# Patient Record
Sex: FEMALE | Race: WHITE | NOT HISPANIC OR LATINO | Employment: OTHER
[De-identification: names, ages, dates, MRNs, and addresses within clinical notes are randomized per-mention and may not be internally consistent; named-entity substitution may affect disease eponyms.]

---

## 2017-08-19 ENCOUNTER — HEALTH MAINTENANCE LETTER (OUTPATIENT)
Age: 52
End: 2017-08-19

## 2019-11-06 ENCOUNTER — HEALTH MAINTENANCE LETTER (OUTPATIENT)
Age: 54
End: 2019-11-06

## 2020-11-29 ENCOUNTER — HEALTH MAINTENANCE LETTER (OUTPATIENT)
Age: 55
End: 2020-11-29

## 2021-05-26 ENCOUNTER — RECORDS - HEALTHEAST (OUTPATIENT)
Dept: ADMINISTRATIVE | Facility: CLINIC | Age: 56
End: 2021-05-26

## 2021-05-27 ENCOUNTER — RECORDS - HEALTHEAST (OUTPATIENT)
Dept: ADMINISTRATIVE | Facility: CLINIC | Age: 56
End: 2021-05-27

## 2021-05-30 ENCOUNTER — HEALTH MAINTENANCE LETTER (OUTPATIENT)
Age: 56
End: 2021-05-30

## 2021-09-19 ENCOUNTER — HEALTH MAINTENANCE LETTER (OUTPATIENT)
Age: 56
End: 2021-09-19

## 2021-11-14 ENCOUNTER — HEALTH MAINTENANCE LETTER (OUTPATIENT)
Age: 56
End: 2021-11-14

## 2022-01-09 ENCOUNTER — HEALTH MAINTENANCE LETTER (OUTPATIENT)
Age: 57
End: 2022-01-09

## 2022-08-21 ENCOUNTER — HEALTH MAINTENANCE LETTER (OUTPATIENT)
Age: 57
End: 2022-08-21

## 2022-11-21 ENCOUNTER — HEALTH MAINTENANCE LETTER (OUTPATIENT)
Age: 57
End: 2022-11-21

## 2023-04-16 ENCOUNTER — HEALTH MAINTENANCE LETTER (OUTPATIENT)
Age: 58
End: 2023-04-16

## 2023-10-07 ENCOUNTER — APPOINTMENT (OUTPATIENT)
Dept: MRI IMAGING | Facility: HOSPITAL | Age: 58
End: 2023-10-07
Attending: EMERGENCY MEDICINE
Payer: COMMERCIAL

## 2023-10-07 ENCOUNTER — HOSPITAL ENCOUNTER (EMERGENCY)
Facility: HOSPITAL | Age: 58
Discharge: HOME | End: 2023-10-07
Attending: EMERGENCY MEDICINE | Admitting: EMERGENCY MEDICINE
Payer: COMMERCIAL

## 2023-10-07 VITALS
SYSTOLIC BLOOD PRESSURE: 159 MMHG | OXYGEN SATURATION: 97 % | DIASTOLIC BLOOD PRESSURE: 70 MMHG | RESPIRATION RATE: 18 BRPM | BODY MASS INDEX: 24.17 KG/M2 | TEMPERATURE: 97.6 F | HEART RATE: 88 BPM | WEIGHT: 143 LBS

## 2023-10-07 DIAGNOSIS — L03.115 CELLULITIS OF RIGHT FOOT: ICD-10-CM

## 2023-10-07 LAB
ANION GAP SERPL CALCULATED.3IONS-SCNC: 16 MMOL/L (ref 7–15)
BASO+EOS+MONOS # BLD AUTO: ABNORMAL 10*3/UL
BASO+EOS+MONOS NFR BLD AUTO: ABNORMAL %
BASOPHILS # BLD AUTO: 0.1 10E3/UL (ref 0–0.2)
BASOPHILS NFR BLD AUTO: 1 %
BUN SERPL-MCNC: 16.6 MG/DL (ref 6–20)
CALCIUM SERPL-MCNC: 8.9 MG/DL (ref 8.6–10)
CHLORIDE SERPL-SCNC: 97 MMOL/L (ref 98–107)
CREAT SERPL-MCNC: 0.89 MG/DL (ref 0.51–0.95)
CRP SERPL-MCNC: <3 MG/L
DEPRECATED HCO3 PLAS-SCNC: 22 MMOL/L (ref 22–29)
EGFRCR SERPLBLD CKD-EPI 2021: 75 ML/MIN/1.73M2
EOSINOPHIL # BLD AUTO: 0.1 10E3/UL (ref 0–0.7)
EOSINOPHIL NFR BLD AUTO: 1 %
ERYTHROCYTE [DISTWIDTH] IN BLOOD BY AUTOMATED COUNT: 15.3 % (ref 10–15)
ERYTHROCYTE [SEDIMENTATION RATE] IN BLOOD BY WESTERGREN METHOD: 60 MM/HR (ref 0–30)
GLUCOSE SERPL-MCNC: 252 MG/DL (ref 70–99)
HCT VFR BLD AUTO: 37.1 % (ref 35–47)
HGB BLD-MCNC: 11.5 G/DL (ref 11.7–15.7)
HOLD SPECIMEN: NORMAL
IMM GRANULOCYTES # BLD: 0 10E3/UL
IMM GRANULOCYTES NFR BLD: 0 %
LYMPHOCYTES # BLD AUTO: 2.8 10E3/UL (ref 0.8–5.3)
LYMPHOCYTES NFR BLD AUTO: 26 %
MCH RBC QN AUTO: 26.1 PG (ref 26.5–33)
MCHC RBC AUTO-ENTMCNC: 31 G/DL (ref 31.5–36.5)
MCV RBC AUTO: 84 FL (ref 78–100)
MONOCYTES # BLD AUTO: 0.6 10E3/UL (ref 0–1.3)
MONOCYTES NFR BLD AUTO: 5 %
NEUTROPHILS # BLD AUTO: 7.3 10E3/UL (ref 1.6–8.3)
NEUTROPHILS NFR BLD AUTO: 67 %
NRBC # BLD AUTO: 0 10E3/UL
NRBC BLD AUTO-RTO: 0 /100
PLATELET # BLD AUTO: 404 10E3/UL (ref 150–450)
POTASSIUM SERPL-SCNC: 4.1 MMOL/L (ref 3.4–5.3)
RBC # BLD AUTO: 4.41 10E6/UL (ref 3.8–5.2)
SODIUM SERPL-SCNC: 135 MMOL/L (ref 135–145)
WBC # BLD AUTO: 10.9 10E3/UL (ref 4–11)

## 2023-10-07 PROCEDURE — 99285 EMERGENCY DEPT VISIT HI MDM: CPT | Mod: 25

## 2023-10-07 PROCEDURE — 87040 BLOOD CULTURE FOR BACTERIA: CPT | Performed by: EMERGENCY MEDICINE

## 2023-10-07 PROCEDURE — 96366 THER/PROPH/DIAG IV INF ADDON: CPT

## 2023-10-07 PROCEDURE — 85652 RBC SED RATE AUTOMATED: CPT | Performed by: EMERGENCY MEDICINE

## 2023-10-07 PROCEDURE — 250N000011 HC RX IP 250 OP 636: Performed by: EMERGENCY MEDICINE

## 2023-10-07 PROCEDURE — 85025 COMPLETE CBC W/AUTO DIFF WBC: CPT | Performed by: EMERGENCY MEDICINE

## 2023-10-07 PROCEDURE — 96367 TX/PROPH/DG ADDL SEQ IV INF: CPT

## 2023-10-07 PROCEDURE — 96365 THER/PROPH/DIAG IV INF INIT: CPT | Mod: 59

## 2023-10-07 PROCEDURE — 250N000013 HC RX MED GY IP 250 OP 250 PS 637: Performed by: EMERGENCY MEDICINE

## 2023-10-07 PROCEDURE — 255N000002 HC RX 255 OP 636: Mod: JZ | Performed by: EMERGENCY MEDICINE

## 2023-10-07 PROCEDURE — 73720 MRI LWR EXTREMITY W/O&W/DYE: CPT | Mod: RT

## 2023-10-07 PROCEDURE — A9585 GADOBUTROL INJECTION: HCPCS | Mod: JZ | Performed by: EMERGENCY MEDICINE

## 2023-10-07 PROCEDURE — 36415 COLL VENOUS BLD VENIPUNCTURE: CPT | Performed by: EMERGENCY MEDICINE

## 2023-10-07 PROCEDURE — 258N000003 HC RX IP 258 OP 636: Performed by: EMERGENCY MEDICINE

## 2023-10-07 PROCEDURE — 86140 C-REACTIVE PROTEIN: CPT | Performed by: EMERGENCY MEDICINE

## 2023-10-07 PROCEDURE — 80048 BASIC METABOLIC PNL TOTAL CA: CPT | Performed by: EMERGENCY MEDICINE

## 2023-10-07 RX ORDER — CEFTRIAXONE 1 G/1
1 INJECTION, POWDER, FOR SOLUTION INTRAMUSCULAR; INTRAVENOUS ONCE
Status: COMPLETED | OUTPATIENT
Start: 2023-10-07 | End: 2023-10-07

## 2023-10-07 RX ORDER — SULFAMETHOXAZOLE/TRIMETHOPRIM 800-160 MG
1 TABLET ORAL 2 TIMES DAILY
Qty: 14 TABLET | Refills: 0 | Status: SHIPPED | OUTPATIENT
Start: 2023-10-07 | End: 2023-10-14

## 2023-10-07 RX ORDER — OXYCODONE HYDROCHLORIDE 5 MG/1
5 TABLET ORAL ONCE
Status: COMPLETED | OUTPATIENT
Start: 2023-10-07 | End: 2023-10-07

## 2023-10-07 RX ORDER — DOCUSATE SODIUM 100 MG/1
100 CAPSULE, LIQUID FILLED ORAL 2 TIMES DAILY PRN
Qty: 10 CAPSULE | Refills: 0 | Status: SHIPPED | OUTPATIENT
Start: 2023-10-07

## 2023-10-07 RX ORDER — OXYCODONE HYDROCHLORIDE 5 MG/1
5 TABLET ORAL EVERY 6 HOURS PRN
Qty: 8 TABLET | Refills: 0 | Status: SHIPPED | OUTPATIENT
Start: 2023-10-07

## 2023-10-07 RX ORDER — CEFAZOLIN SODIUM 1 G/50ML
1250 SOLUTION INTRAVENOUS ONCE
Status: COMPLETED | OUTPATIENT
Start: 2023-10-07 | End: 2023-10-07

## 2023-10-07 RX ORDER — GADOBUTROL 604.72 MG/ML
6 INJECTION INTRAVENOUS ONCE
Status: COMPLETED | OUTPATIENT
Start: 2023-10-07 | End: 2023-10-07

## 2023-10-07 RX ADMIN — OXYCODONE HYDROCHLORIDE 5 MG: 5 TABLET ORAL at 15:13

## 2023-10-07 RX ADMIN — CEFTRIAXONE SODIUM 1 G: 1 INJECTION, POWDER, FOR SOLUTION INTRAMUSCULAR; INTRAVENOUS at 16:39

## 2023-10-07 RX ADMIN — GADOBUTROL 6 ML: 604.72 INJECTION INTRAVENOUS at 14:48

## 2023-10-07 RX ADMIN — VANCOMYCIN HYDROCHLORIDE 1250 MG: 500 INJECTION, POWDER, LYOPHILIZED, FOR SOLUTION INTRAVENOUS at 15:09

## 2023-10-07 ASSESSMENT — ENCOUNTER SYMPTOMS
SHORTNESS OF BREATH: 0
ABDOMINAL PAIN: 0
COUGH: 0
DIARRHEA: 0
VOMITING: 0
FEVER: 0
COLOR CHANGE: 1
NAUSEA: 0

## 2023-10-07 ASSESSMENT — ACTIVITIES OF DAILY LIVING (ADL)
ADLS_ACUITY_SCORE: 35
ADLS_ACUITY_SCORE: 35
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
## 2023-10-07 NOTE — ED TRIAGE NOTES
The pt arrives with family with c/o of foot pain. The pt had her great toe on the right foot amputated in May due to infection. She states the foot is always read but has gotten more painful and red over the past few weeks.      Triage Assessment       Row Name 10/07/23 9228       Triage Assessment (Adult)    Airway WDL WDL       Cognitive/Neuro/Behavioral WDL    Cognitive/Neuro/Behavioral WDL WDL

## 2023-10-07 NOTE — ED NOTES
Patient discharged at 1722 to home; family providing transportation. Patient provided with all discharge paperwork and educational material. All questions answered to patient's satisfaction.

## 2023-10-07 NOTE — DISCHARGE INSTRUCTIONS
Take the antibiotics as directed.  Take your first dose this evening at bedtime.    Call make an appointment to follow-up with podiatry to help follow along this infection.    Keep your appointment to see your specialist in [place] that is upcoming.    Return the emergency department with worsening redness, worsening pain, fevers, pus draining, or any other concerns.    Thank you for choosing [facility] Emergency Department.  It has been my pleasure caring for you today.     ~Dr. Osmany MD

## 2023-10-12 LAB
BACTERIA BLD CULT: NO GROWTH
BACTERIA BLD CULT: NO GROWTH

## 2023-11-26 ENCOUNTER — HEALTH MAINTENANCE LETTER (OUTPATIENT)
Age: 58
End: 2023-11-26

## 2024-06-23 ENCOUNTER — HEALTH MAINTENANCE LETTER (OUTPATIENT)
Age: 59
End: 2024-06-23

## 2025-01-04 ENCOUNTER — HEALTH MAINTENANCE LETTER (OUTPATIENT)
Age: 60
End: 2025-01-04

## 2025-02-04 ENCOUNTER — LAB REQUISITION (OUTPATIENT)
Dept: LAB | Facility: CLINIC | Age: 60
End: 2025-02-04
Payer: COMMERCIAL

## 2025-02-04 DIAGNOSIS — R76.12 NONSPECIFIC REACTION TO CELL MEDIATED IMMUNITY MEASUREMENT OF GAMMA INTERFERON ANTIGEN RESPONSE WITHOUT ACTIVE TUBERCULOSIS: ICD-10-CM

## 2025-02-05 ENCOUNTER — LAB REQUISITION (OUTPATIENT)
Dept: LAB | Facility: CLINIC | Age: 60
End: 2025-02-05

## 2025-02-05 ENCOUNTER — LAB REQUISITION (OUTPATIENT)
Dept: LAB | Facility: CLINIC | Age: 60
End: 2025-02-05
Payer: COMMERCIAL

## 2025-02-05 DIAGNOSIS — R94.5 ABNORMAL RESULTS OF LIVER FUNCTION STUDIES: ICD-10-CM

## 2025-02-05 DIAGNOSIS — R94.4 ABNORMAL RESULTS OF KIDNEY FUNCTION STUDIES: ICD-10-CM

## 2025-02-05 DIAGNOSIS — I73.9 PERIPHERAL VASCULAR DISEASE, UNSPECIFIED: ICD-10-CM

## 2025-02-05 DIAGNOSIS — R79.9 ABNORMAL FINDING OF BLOOD CHEMISTRY, UNSPECIFIED: ICD-10-CM

## 2025-02-05 DIAGNOSIS — T81.42XD INFECTION FOLLOWING A PROCEDURE, DEEP INCISIONAL SURGICAL SITE, SUBSEQUENT ENCOUNTER: ICD-10-CM

## 2025-02-05 PROCEDURE — 36415 COLL VENOUS BLD VENIPUNCTURE: CPT | Mod: ORL | Performed by: NURSE PRACTITIONER

## 2025-02-05 PROCEDURE — P9604 ONE-WAY ALLOW PRORATED TRIP: HCPCS | Mod: ORL | Performed by: NURSE PRACTITIONER

## 2025-02-05 PROCEDURE — 86481 TB AG RESPONSE T-CELL SUSP: CPT | Mod: ORL | Performed by: NURSE PRACTITIONER

## 2025-02-06 LAB
ALBUMIN SERPL BCG-MCNC: 3.4 G/DL (ref 3.5–5.2)
ALP SERPL-CCNC: 99 U/L (ref 40–150)
ALT SERPL W P-5'-P-CCNC: 19 U/L (ref 0–50)
ANION GAP SERPL CALCULATED.3IONS-SCNC: 16 MMOL/L (ref 7–15)
AST SERPL W P-5'-P-CCNC: 28 U/L (ref 0–45)
BASOPHILS # BLD AUTO: 0 10E3/UL (ref 0–0.2)
BASOPHILS NFR BLD AUTO: 1 %
BILIRUB DIRECT SERPL-MCNC: <0.2 MG/DL (ref 0–0.3)
BILIRUB SERPL-MCNC: <0.2 MG/DL
BUN SERPL-MCNC: 19.7 MG/DL (ref 8–23)
CALCIUM SERPL-MCNC: 9.1 MG/DL (ref 8.8–10.4)
CHLORIDE SERPL-SCNC: 105 MMOL/L (ref 98–107)
CREAT SERPL-MCNC: 0.87 MG/DL (ref 0.51–0.95)
CREAT SERPL-MCNC: 0.87 MG/DL (ref 0.51–0.95)
EGFRCR SERPLBLD CKD-EPI 2021: 76 ML/MIN/1.73M2
EGFRCR SERPLBLD CKD-EPI 2021: 76 ML/MIN/1.73M2
EOSINOPHIL # BLD AUTO: 0.2 10E3/UL (ref 0–0.7)
EOSINOPHIL NFR BLD AUTO: 2 %
ERYTHROCYTE [DISTWIDTH] IN BLOOD BY AUTOMATED COUNT: 17.2 % (ref 10–15)
GAMMA INTERFERON BACKGROUND BLD IA-ACNC: 0.01 IU/ML
GLUCOSE SERPL-MCNC: 211 MG/DL (ref 70–99)
HCO3 SERPL-SCNC: 17 MMOL/L (ref 22–29)
HCT VFR BLD AUTO: 30.2 % (ref 35–47)
HGB BLD-MCNC: 9.5 G/DL (ref 11.7–15.7)
IMM GRANULOCYTES # BLD: 0.1 10E3/UL
IMM GRANULOCYTES NFR BLD: 1 %
LYMPHOCYTES # BLD AUTO: 0.7 10E3/UL (ref 0.8–5.3)
LYMPHOCYTES NFR BLD AUTO: 9 %
M TB IFN-G BLD-IMP: NEGATIVE
M TB IFN-G CD4+ BCKGRND COR BLD-ACNC: 9.99 IU/ML
MCH RBC QN AUTO: 26.7 PG (ref 26.5–33)
MCHC RBC AUTO-ENTMCNC: 31.5 G/DL (ref 31.5–36.5)
MCV RBC AUTO: 85 FL (ref 78–100)
MITOGEN IGNF BCKGRD COR BLD-ACNC: 0.01 IU/ML
MITOGEN IGNF BCKGRD COR BLD-ACNC: 0.01 IU/ML
MONOCYTES # BLD AUTO: 0.6 10E3/UL (ref 0–1.3)
MONOCYTES NFR BLD AUTO: 7 %
NEUTROPHILS # BLD AUTO: 6.5 10E3/UL (ref 1.6–8.3)
NEUTROPHILS NFR BLD AUTO: 81 %
NRBC # BLD AUTO: 0 10E3/UL
NRBC BLD AUTO-RTO: 0 /100
PLAT MORPH BLD: ABNORMAL
PLATELET # BLD AUTO: ABNORMAL 10*3/UL
POTASSIUM SERPL-SCNC: 4.8 MMOL/L (ref 3.4–5.3)
PROT SERPL-MCNC: 6.8 G/DL (ref 6.4–8.3)
QUANTIFERON MITOGEN: 10 IU/ML
QUANTIFERON NIL TUBE: 0.01 IU/ML
QUANTIFERON TB1 TUBE: 0.02 IU/ML
QUANTIFERON TB2 TUBE: 0.02
RBC # BLD AUTO: 3.56 10E6/UL (ref 3.8–5.2)
RBC MORPH BLD: ABNORMAL
SODIUM SERPL-SCNC: 138 MMOL/L (ref 135–145)
WBC # BLD AUTO: 8 10E3/UL (ref 4–11)

## 2025-02-06 PROCEDURE — 82565 ASSAY OF CREATININE: CPT | Mod: ORL | Performed by: NURSE PRACTITIONER

## 2025-02-06 PROCEDURE — 80053 COMPREHEN METABOLIC PANEL: CPT | Mod: ORL | Performed by: FAMILY MEDICINE

## 2025-02-06 PROCEDURE — 85018 HEMOGLOBIN: CPT | Mod: ORL | Performed by: NURSE PRACTITIONER

## 2025-02-06 PROCEDURE — 85004 AUTOMATED DIFF WBC COUNT: CPT | Mod: ORL | Performed by: NURSE PRACTITIONER

## 2025-02-06 PROCEDURE — 82248 BILIRUBIN DIRECT: CPT | Mod: ORL | Performed by: NURSE PRACTITIONER

## 2025-02-06 PROCEDURE — P9604 ONE-WAY ALLOW PRORATED TRIP: HCPCS | Mod: ORL | Performed by: NURSE PRACTITIONER

## 2025-02-06 PROCEDURE — 36415 COLL VENOUS BLD VENIPUNCTURE: CPT | Mod: ORL | Performed by: NURSE PRACTITIONER

## 2025-02-06 PROCEDURE — 85041 AUTOMATED RBC COUNT: CPT | Mod: ORL | Performed by: NURSE PRACTITIONER

## 2025-02-07 LAB — HOLD SPECIMEN: NORMAL

## 2025-02-07 PROCEDURE — 36415 COLL VENOUS BLD VENIPUNCTURE: CPT | Mod: ORL | Performed by: NURSE PRACTITIONER

## 2025-02-11 ENCOUNTER — LAB REQUISITION (OUTPATIENT)
Dept: LAB | Facility: CLINIC | Age: 60
End: 2025-02-11
Payer: COMMERCIAL

## 2025-02-11 DIAGNOSIS — E78.5 HYPERLIPIDEMIA, UNSPECIFIED: ICD-10-CM

## 2025-02-11 DIAGNOSIS — I10 ESSENTIAL (PRIMARY) HYPERTENSION: ICD-10-CM

## 2025-02-13 LAB
BASOPHILS # BLD AUTO: 0.1 10E3/UL (ref 0–0.2)
BASOPHILS NFR BLD AUTO: 1 %
EOSINOPHIL # BLD AUTO: 0.1 10E3/UL (ref 0–0.7)
EOSINOPHIL NFR BLD AUTO: 1 %
ERYTHROCYTE [DISTWIDTH] IN BLOOD BY AUTOMATED COUNT: 17.2 % (ref 10–15)
HCT VFR BLD AUTO: 33.1 % (ref 35–47)
HGB BLD-MCNC: 9.3 G/DL (ref 11.7–15.7)
IMM GRANULOCYTES # BLD: 0.1 10E3/UL
IMM GRANULOCYTES NFR BLD: 1 %
LYMPHOCYTES # BLD AUTO: 1 10E3/UL (ref 0.8–5.3)
LYMPHOCYTES NFR BLD AUTO: 11 %
MCH RBC QN AUTO: 25.1 PG (ref 26.5–33)
MCHC RBC AUTO-ENTMCNC: 28.1 G/DL (ref 31.5–36.5)
MCV RBC AUTO: 90 FL (ref 78–100)
MONOCYTES # BLD AUTO: 0.5 10E3/UL (ref 0–1.3)
MONOCYTES NFR BLD AUTO: 6 %
NEUTROPHILS # BLD AUTO: 7.2 10E3/UL (ref 1.6–8.3)
NEUTROPHILS NFR BLD AUTO: 81 %
NRBC # BLD AUTO: 0 10E3/UL
NRBC BLD AUTO-RTO: 0 /100
PLATELET # BLD AUTO: 401 10E3/UL (ref 150–450)
RBC # BLD AUTO: 3.7 10E6/UL (ref 3.8–5.2)
WBC # BLD AUTO: 8.9 10E3/UL (ref 4–11)

## 2025-02-13 PROCEDURE — 80076 HEPATIC FUNCTION PANEL: CPT | Mod: ORL | Performed by: NURSE PRACTITIONER

## 2025-02-13 PROCEDURE — P9604 ONE-WAY ALLOW PRORATED TRIP: HCPCS | Mod: ORL | Performed by: NURSE PRACTITIONER

## 2025-02-13 PROCEDURE — 36415 COLL VENOUS BLD VENIPUNCTURE: CPT | Mod: ORL | Performed by: NURSE PRACTITIONER

## 2025-02-13 PROCEDURE — 85025 COMPLETE CBC W/AUTO DIFF WBC: CPT | Mod: ORL | Performed by: NURSE PRACTITIONER

## 2025-02-13 PROCEDURE — 82565 ASSAY OF CREATININE: CPT | Mod: ORL | Performed by: NURSE PRACTITIONER

## 2025-02-14 LAB
ALBUMIN SERPL BCG-MCNC: 3.8 G/DL (ref 3.5–5.2)
ALP SERPL-CCNC: 87 U/L (ref 40–150)
ALT SERPL W P-5'-P-CCNC: 18 U/L (ref 0–50)
AST SERPL W P-5'-P-CCNC: 17 U/L (ref 0–45)
BILIRUB DIRECT SERPL-MCNC: <0.2 MG/DL (ref 0–0.3)
BILIRUB SERPL-MCNC: <0.2 MG/DL
CREAT SERPL-MCNC: 0.98 MG/DL (ref 0.51–0.95)
EGFRCR SERPLBLD CKD-EPI 2021: 66 ML/MIN/1.73M2
PROT SERPL-MCNC: 7.1 G/DL (ref 6.4–8.3)

## 2025-02-18 ENCOUNTER — LAB REQUISITION (OUTPATIENT)
Dept: LAB | Facility: CLINIC | Age: 60
End: 2025-02-18
Payer: COMMERCIAL

## 2025-02-18 DIAGNOSIS — E78.5 HYPERLIPIDEMIA, UNSPECIFIED: ICD-10-CM

## 2025-02-18 DIAGNOSIS — I10 ESSENTIAL (PRIMARY) HYPERTENSION: ICD-10-CM

## 2025-02-20 LAB
ALBUMIN SERPL BCG-MCNC: 3.9 G/DL (ref 3.5–5.2)
ALP SERPL-CCNC: 100 U/L (ref 40–150)
ALT SERPL W P-5'-P-CCNC: 14 U/L (ref 0–50)
AST SERPL W P-5'-P-CCNC: 17 U/L (ref 0–45)
BASOPHILS # BLD AUTO: 0.1 10E3/UL (ref 0–0.2)
BASOPHILS NFR BLD AUTO: 1 %
BILIRUB DIRECT SERPL-MCNC: <0.08 MG/DL (ref 0–0.3)
BILIRUB SERPL-MCNC: 0.2 MG/DL
CREAT SERPL-MCNC: 0.95 MG/DL (ref 0.51–0.95)
EGFRCR SERPLBLD CKD-EPI 2021: 69 ML/MIN/1.73M2
EOSINOPHIL # BLD AUTO: 0.1 10E3/UL (ref 0–0.7)
EOSINOPHIL NFR BLD AUTO: 1 %
ERYTHROCYTE [DISTWIDTH] IN BLOOD BY AUTOMATED COUNT: 16.6 % (ref 10–15)
HCT VFR BLD AUTO: 30.2 % (ref 35–47)
HGB BLD-MCNC: 9.4 G/DL (ref 11.7–15.7)
IMM GRANULOCYTES # BLD: 0 10E3/UL
IMM GRANULOCYTES NFR BLD: 0 %
LYMPHOCYTES # BLD AUTO: 0.9 10E3/UL (ref 0.8–5.3)
LYMPHOCYTES NFR BLD AUTO: 11 %
MCH RBC QN AUTO: 25.5 PG (ref 26.5–33)
MCHC RBC AUTO-ENTMCNC: 31.1 G/DL (ref 31.5–36.5)
MCV RBC AUTO: 82 FL (ref 78–100)
MONOCYTES # BLD AUTO: 0.9 10E3/UL (ref 0–1.3)
MONOCYTES NFR BLD AUTO: 12 %
NEUTROPHILS # BLD AUTO: 5.8 10E3/UL (ref 1.6–8.3)
NEUTROPHILS NFR BLD AUTO: 75 %
NRBC # BLD AUTO: 0 10E3/UL
NRBC BLD AUTO-RTO: 0 /100
PLATELET # BLD AUTO: 359 10E3/UL (ref 150–450)
PROT SERPL-MCNC: 6.9 G/DL (ref 6.4–8.3)
RBC # BLD AUTO: 3.69 10E6/UL (ref 3.8–5.2)
WBC # BLD AUTO: 7.7 10E3/UL (ref 4–11)

## 2025-02-20 PROCEDURE — 80076 HEPATIC FUNCTION PANEL: CPT | Mod: ORL | Performed by: NURSE PRACTITIONER

## 2025-02-20 PROCEDURE — 85025 COMPLETE CBC W/AUTO DIFF WBC: CPT | Mod: ORL | Performed by: NURSE PRACTITIONER

## 2025-02-20 PROCEDURE — 82565 ASSAY OF CREATININE: CPT | Mod: ORL | Performed by: NURSE PRACTITIONER

## 2025-02-20 PROCEDURE — 36415 COLL VENOUS BLD VENIPUNCTURE: CPT | Mod: ORL | Performed by: NURSE PRACTITIONER

## 2025-02-20 PROCEDURE — P9604 ONE-WAY ALLOW PRORATED TRIP: HCPCS | Mod: ORL | Performed by: NURSE PRACTITIONER

## 2025-02-24 ENCOUNTER — LAB REQUISITION (OUTPATIENT)
Dept: LAB | Facility: CLINIC | Age: 60
End: 2025-02-24
Payer: COMMERCIAL

## 2025-02-24 DIAGNOSIS — E78.5 HYPERLIPIDEMIA, UNSPECIFIED: ICD-10-CM

## 2025-02-24 DIAGNOSIS — I10 ESSENTIAL (PRIMARY) HYPERTENSION: ICD-10-CM

## 2025-02-27 LAB
ALBUMIN SERPL BCG-MCNC: 3.8 G/DL (ref 3.5–5.2)
ALP SERPL-CCNC: 125 U/L (ref 40–150)
ALT SERPL W P-5'-P-CCNC: 13 U/L (ref 0–50)
AST SERPL W P-5'-P-CCNC: 15 U/L (ref 0–45)
BASOPHILS # BLD AUTO: 0 10E3/UL (ref 0–0.2)
BASOPHILS NFR BLD AUTO: 1 %
BILIRUB DIRECT SERPL-MCNC: <0.08 MG/DL (ref 0–0.3)
BILIRUB SERPL-MCNC: <0.2 MG/DL
CREAT SERPL-MCNC: 1 MG/DL (ref 0.51–0.95)
EGFRCR SERPLBLD CKD-EPI 2021: 65 ML/MIN/1.73M2
EOSINOPHIL # BLD AUTO: 0.2 10E3/UL (ref 0–0.7)
EOSINOPHIL NFR BLD AUTO: 2 %
ERYTHROCYTE [DISTWIDTH] IN BLOOD BY AUTOMATED COUNT: 16.9 % (ref 10–15)
HCT VFR BLD AUTO: 30.2 % (ref 35–47)
HGB BLD-MCNC: 9.3 G/DL (ref 11.7–15.7)
IMM GRANULOCYTES # BLD: 0.1 10E3/UL
IMM GRANULOCYTES NFR BLD: 1 %
LYMPHOCYTES # BLD AUTO: 0.8 10E3/UL (ref 0.8–5.3)
LYMPHOCYTES NFR BLD AUTO: 9 %
MCH RBC QN AUTO: 25.7 PG (ref 26.5–33)
MCHC RBC AUTO-ENTMCNC: 30.8 G/DL (ref 31.5–36.5)
MCV RBC AUTO: 83 FL (ref 78–100)
MONOCYTES # BLD AUTO: 1 10E3/UL (ref 0–1.3)
MONOCYTES NFR BLD AUTO: 11 %
NEUTROPHILS # BLD AUTO: 6.7 10E3/UL (ref 1.6–8.3)
NEUTROPHILS NFR BLD AUTO: 76 %
NRBC # BLD AUTO: 0 10E3/UL
NRBC BLD AUTO-RTO: 0 /100
PLATELET # BLD AUTO: 302 10E3/UL (ref 150–450)
PROT SERPL-MCNC: 7.2 G/DL (ref 6.4–8.3)
RBC # BLD AUTO: 3.62 10E6/UL (ref 3.8–5.2)
WBC # BLD AUTO: 8.8 10E3/UL (ref 4–11)

## 2025-02-27 PROCEDURE — P9604 ONE-WAY ALLOW PRORATED TRIP: HCPCS | Mod: ORL | Performed by: NURSE PRACTITIONER

## 2025-02-27 PROCEDURE — 36415 COLL VENOUS BLD VENIPUNCTURE: CPT | Mod: ORL | Performed by: NURSE PRACTITIONER

## 2025-02-27 PROCEDURE — 80076 HEPATIC FUNCTION PANEL: CPT | Mod: ORL | Performed by: NURSE PRACTITIONER

## 2025-02-27 PROCEDURE — 82565 ASSAY OF CREATININE: CPT | Mod: ORL | Performed by: NURSE PRACTITIONER

## 2025-02-27 PROCEDURE — 85025 COMPLETE CBC W/AUTO DIFF WBC: CPT | Mod: ORL | Performed by: NURSE PRACTITIONER

## 2025-03-18 ENCOUNTER — LAB REQUISITION (OUTPATIENT)
Dept: LAB | Facility: CLINIC | Age: 60
End: 2025-03-18
Payer: COMMERCIAL

## 2025-03-18 DIAGNOSIS — E11.9 TYPE 2 DIABETES MELLITUS WITHOUT COMPLICATIONS (H): ICD-10-CM

## 2025-03-18 DIAGNOSIS — E03.8 OTHER SPECIFIED HYPOTHYROIDISM: ICD-10-CM

## 2025-03-19 LAB
EST. AVERAGE GLUCOSE BLD GHB EST-MCNC: 174 MG/DL
HBA1C MFR BLD: 7.7 %
T3 SERPL-MCNC: 62 NG/DL (ref 85–202)
T4 FREE SERPL-MCNC: 1.43 NG/DL (ref 0.9–1.7)
TSH SERPL DL<=0.005 MIU/L-ACNC: 2.67 UIU/ML (ref 0.3–4.2)

## 2025-03-19 PROCEDURE — 36415 COLL VENOUS BLD VENIPUNCTURE: CPT | Mod: ORL | Performed by: NURSE PRACTITIONER

## 2025-03-19 PROCEDURE — 84480 ASSAY TRIIODOTHYRONINE (T3): CPT | Mod: ORL | Performed by: NURSE PRACTITIONER

## 2025-03-19 PROCEDURE — 83036 HEMOGLOBIN GLYCOSYLATED A1C: CPT | Mod: ORL | Performed by: NURSE PRACTITIONER

## 2025-03-19 PROCEDURE — 84443 ASSAY THYROID STIM HORMONE: CPT | Mod: ORL | Performed by: NURSE PRACTITIONER

## 2025-03-19 PROCEDURE — 84439 ASSAY OF FREE THYROXINE: CPT | Mod: ORL | Performed by: NURSE PRACTITIONER

## 2025-03-19 PROCEDURE — P9604 ONE-WAY ALLOW PRORATED TRIP: HCPCS | Mod: ORL | Performed by: NURSE PRACTITIONER

## 2025-03-20 ENCOUNTER — LAB REQUISITION (OUTPATIENT)
Dept: LAB | Facility: CLINIC | Age: 60
End: 2025-03-20
Payer: COMMERCIAL

## 2025-03-20 DIAGNOSIS — R30.0 DYSURIA: ICD-10-CM

## 2025-03-20 LAB
ALBUMIN UR-MCNC: 10 MG/DL
APPEARANCE UR: ABNORMAL
BACTERIA #/AREA URNS HPF: ABNORMAL /HPF
BILIRUB UR QL STRIP: NEGATIVE
COLOR UR AUTO: YELLOW
GLUCOSE UR STRIP-MCNC: 50 MG/DL
HGB UR QL STRIP: NEGATIVE
KETONES UR STRIP-MCNC: NEGATIVE MG/DL
LEUKOCYTE ESTERASE UR QL STRIP: ABNORMAL
MUCOUS THREADS #/AREA URNS LPF: PRESENT /LPF
NITRATE UR QL: NEGATIVE
PH UR STRIP: 5.5 [PH] (ref 5–7)
RBC URINE: 1 /HPF
SP GR UR STRIP: 1.02 (ref 1–1.03)
SQUAMOUS EPITHELIAL: 1 /HPF
UROBILINOGEN UR STRIP-MCNC: NORMAL MG/DL
WBC URINE: >182 /HPF

## 2025-03-20 PROCEDURE — 81001 URINALYSIS AUTO W/SCOPE: CPT | Mod: ORL | Performed by: FAMILY MEDICINE

## 2025-03-20 PROCEDURE — 87086 URINE CULTURE/COLONY COUNT: CPT | Mod: ORL | Performed by: FAMILY MEDICINE

## 2025-03-21 LAB — BACTERIA UR CULT: NORMAL

## 2025-03-24 ENCOUNTER — LAB REQUISITION (OUTPATIENT)
Dept: LAB | Facility: CLINIC | Age: 60
End: 2025-03-24
Payer: COMMERCIAL

## 2025-03-24 DIAGNOSIS — R30.0 DYSURIA: ICD-10-CM

## 2025-03-24 LAB
ALBUMIN UR-MCNC: 10 MG/DL
APPEARANCE UR: ABNORMAL
BILIRUB UR QL STRIP: NEGATIVE
COLOR UR AUTO: YELLOW
GLUCOSE UR STRIP-MCNC: NEGATIVE MG/DL
HGB UR QL STRIP: NEGATIVE
KETONES UR STRIP-MCNC: NEGATIVE MG/DL
LEUKOCYTE ESTERASE UR QL STRIP: ABNORMAL
MUCOUS THREADS #/AREA URNS LPF: PRESENT /LPF
NITRATE UR QL: NEGATIVE
PH UR STRIP: 5.5 [PH] (ref 5–7)
RBC URINE: 4 /HPF
SP GR UR STRIP: 1.02 (ref 1–1.03)
SQUAMOUS EPITHELIAL: 12 /HPF
UROBILINOGEN UR STRIP-MCNC: NORMAL MG/DL
WBC CLUMPS #/AREA URNS HPF: PRESENT /HPF
WBC URINE: >182 /HPF

## 2025-03-24 PROCEDURE — 81001 URINALYSIS AUTO W/SCOPE: CPT | Mod: ORL | Performed by: NURSE PRACTITIONER

## 2025-03-24 PROCEDURE — 87086 URINE CULTURE/COLONY COUNT: CPT | Mod: ORL | Performed by: NURSE PRACTITIONER

## 2025-03-25 LAB — BACTERIA UR CULT: NORMAL

## 2025-04-01 ENCOUNTER — LAB REQUISITION (OUTPATIENT)
Dept: LAB | Facility: CLINIC | Age: 60
End: 2025-04-01
Payer: COMMERCIAL

## 2025-04-01 DIAGNOSIS — R30.0 DYSURIA: ICD-10-CM

## 2025-04-01 LAB
ALBUMIN UR-MCNC: NEGATIVE MG/DL
APPEARANCE UR: ABNORMAL
BACTERIA #/AREA URNS HPF: ABNORMAL /HPF
BILIRUB UR QL STRIP: NEGATIVE
COLOR UR AUTO: YELLOW
GLUCOSE UR STRIP-MCNC: 300 MG/DL
HGB UR QL STRIP: NEGATIVE
KETONES UR STRIP-MCNC: NEGATIVE MG/DL
LEUKOCYTE ESTERASE UR QL STRIP: ABNORMAL
NITRATE UR QL: NEGATIVE
PH UR STRIP: 5.5 [PH] (ref 5–7)
RBC URINE: 2 /HPF
SP GR UR STRIP: 1.02 (ref 1–1.03)
SQUAMOUS EPITHELIAL: 5 /HPF
UROBILINOGEN UR STRIP-MCNC: NORMAL MG/DL
WBC URINE: >182 /HPF

## 2025-04-01 PROCEDURE — 87186 SC STD MICRODIL/AGAR DIL: CPT | Mod: ORL | Performed by: NURSE PRACTITIONER

## 2025-04-01 PROCEDURE — 87088 URINE BACTERIA CULTURE: CPT | Mod: ORL | Performed by: NURSE PRACTITIONER

## 2025-04-01 PROCEDURE — 81001 URINALYSIS AUTO W/SCOPE: CPT | Mod: ORL | Performed by: NURSE PRACTITIONER

## 2025-04-03 LAB
BACTERIA UR CULT: ABNORMAL
BACTERIA UR CULT: ABNORMAL

## 2025-04-16 ENCOUNTER — LAB REQUISITION (OUTPATIENT)
Dept: LAB | Facility: CLINIC | Age: 60
End: 2025-04-16
Payer: COMMERCIAL

## 2025-04-16 DIAGNOSIS — R19.7 DIARRHEA, UNSPECIFIED: ICD-10-CM

## 2025-04-16 LAB
C DIFF GDH STL QL IA: POSITIVE
C DIFF TOX A+B STL QL IA: NEGATIVE
C DIFF TOX B STL QL: POSITIVE
LABORATORY COMMENT REPORT: ABNORMAL

## 2025-04-16 PROCEDURE — 87493 C DIFF AMPLIFIED PROBE: CPT | Mod: ORL | Performed by: NURSE PRACTITIONER

## 2025-04-16 PROCEDURE — 87324 CLOSTRIDIUM AG IA: CPT | Mod: ORL | Performed by: NURSE PRACTITIONER

## 2025-05-01 ENCOUNTER — APPOINTMENT (OUTPATIENT)
Dept: RADIOLOGY | Facility: HOSPITAL | Age: 60
End: 2025-05-01
Attending: EMERGENCY MEDICINE
Payer: COMMERCIAL

## 2025-05-01 ENCOUNTER — HOSPITAL ENCOUNTER (INPATIENT)
Facility: HOSPITAL | Age: 60
End: 2025-05-01
Attending: EMERGENCY MEDICINE | Admitting: STUDENT IN AN ORGANIZED HEALTH CARE EDUCATION/TRAINING PROGRAM
Payer: COMMERCIAL

## 2025-05-01 DIAGNOSIS — R19.7 NAUSEA VOMITING AND DIARRHEA: ICD-10-CM

## 2025-05-01 DIAGNOSIS — I10 HYPERTENSION, BENIGN ESSENTIAL, GOAL BELOW 140/90: ICD-10-CM

## 2025-05-01 DIAGNOSIS — Z86.19 HISTORY OF CLOSTRIDIOIDES DIFFICILE INFECTION: ICD-10-CM

## 2025-05-01 DIAGNOSIS — I21.02 ST ELEVATION MYOCARDIAL INFARCTION INVOLVING LEFT ANTERIOR DESCENDING (LAD) CORONARY ARTERY (H): Primary | ICD-10-CM

## 2025-05-01 DIAGNOSIS — R11.2 NAUSEA VOMITING AND DIARRHEA: ICD-10-CM

## 2025-05-01 DIAGNOSIS — B37.81 ESOPHAGEAL CANDIDIASIS (H): ICD-10-CM

## 2025-05-01 DIAGNOSIS — G47.00 INSOMNIA, UNSPECIFIED TYPE: ICD-10-CM

## 2025-05-01 DIAGNOSIS — I21.3 ST ELEVATION MYOCARDIAL INFARCTION (STEMI), UNSPECIFIED ARTERY (H): ICD-10-CM

## 2025-05-01 DIAGNOSIS — I24.9 ACS (ACUTE CORONARY SYNDROME) (H): ICD-10-CM

## 2025-05-01 DIAGNOSIS — R07.9 INTERMITTENT CHEST PAIN: ICD-10-CM

## 2025-05-01 DIAGNOSIS — D62 ANEMIA DUE TO BLOOD LOSS, ACUTE: ICD-10-CM

## 2025-05-01 PROBLEM — Z98.61 PERCUTANEOUS TRANSLUMINAL CORONARY ANGIOPLASTY STATUS: Status: ACTIVE | Noted: 2025-05-01

## 2025-05-01 LAB
ACT BLD: 247 SECONDS (ref 74–150)
ACT BLD: 259 SECONDS (ref 74–150)
ALBUMIN SERPL BCG-MCNC: 4.2 G/DL (ref 3.5–5.2)
ALP SERPL-CCNC: 126 U/L (ref 40–150)
ALT SERPL W P-5'-P-CCNC: 55 U/L (ref 0–50)
ANION GAP SERPL CALCULATED.3IONS-SCNC: 15 MMOL/L (ref 7–15)
APTT PPP: 29 SECONDS (ref 22–38)
AST SERPL W P-5'-P-CCNC: 450 U/L (ref 0–45)
ATRIAL RATE - MUSE: 107 BPM
BASOPHILS # BLD AUTO: 0 10E3/UL (ref 0–0.2)
BASOPHILS NFR BLD AUTO: 0 %
BILIRUB DIRECT SERPL-MCNC: 0.11 MG/DL (ref 0–0.3)
BILIRUB SERPL-MCNC: 0.2 MG/DL
BUN SERPL-MCNC: 18.1 MG/DL (ref 8–23)
CALCIUM SERPL-MCNC: 9.8 MG/DL (ref 8.8–10.4)
CHLORIDE SERPL-SCNC: 99 MMOL/L (ref 98–107)
CHOLEST SERPL-MCNC: 121 MG/DL
CREAT SERPL-MCNC: 0.95 MG/DL (ref 0.51–0.95)
DIASTOLIC BLOOD PRESSURE - MUSE: NORMAL MMHG
EGFRCR SERPLBLD CKD-EPI 2021: 69 ML/MIN/1.73M2
EOSINOPHIL # BLD AUTO: 0 10E3/UL (ref 0–0.7)
EOSINOPHIL NFR BLD AUTO: 0 %
ERYTHROCYTE [DISTWIDTH] IN BLOOD BY AUTOMATED COUNT: 16.1 % (ref 10–15)
GLUCOSE BLDC GLUCOMTR-MCNC: 279 MG/DL (ref 70–99)
GLUCOSE SERPL-MCNC: 239 MG/DL (ref 70–99)
HCO3 SERPL-SCNC: 23 MMOL/L (ref 22–29)
HCT VFR BLD AUTO: 36.8 % (ref 35–47)
HDLC SERPL-MCNC: 45 MG/DL
HGB BLD-MCNC: 11 G/DL (ref 11.7–15.7)
HOLD SPECIMEN: NORMAL
IMM GRANULOCYTES # BLD: 0.1 10E3/UL
IMM GRANULOCYTES NFR BLD: 0 %
INR PPP: 1.03 (ref 0.85–1.15)
INTERPRETATION ECG - MUSE: NORMAL
LDLC SERPL CALC-MCNC: 20 MG/DL
LIPASE SERPL-CCNC: 26 U/L (ref 13–60)
LYMPHOCYTES # BLD AUTO: 1 10E3/UL (ref 0.8–5.3)
LYMPHOCYTES NFR BLD AUTO: 6 %
MAGNESIUM SERPL-MCNC: 1.2 MG/DL (ref 1.7–2.3)
MCH RBC QN AUTO: 22.5 PG (ref 26.5–33)
MCHC RBC AUTO-ENTMCNC: 29.9 G/DL (ref 31.5–36.5)
MCV RBC AUTO: 75 FL (ref 78–100)
MONOCYTES # BLD AUTO: 1.1 10E3/UL (ref 0–1.3)
MONOCYTES NFR BLD AUTO: 7 %
NEUTROPHILS # BLD AUTO: 14.4 10E3/UL (ref 1.6–8.3)
NEUTROPHILS NFR BLD AUTO: 87 %
NONHDLC SERPL-MCNC: 76 MG/DL
NRBC # BLD AUTO: 0 10E3/UL
NRBC BLD AUTO-RTO: 0 /100
P AXIS - MUSE: 42 DEGREES
PLATELET # BLD AUTO: 349 10E3/UL (ref 150–450)
POTASSIUM SERPL-SCNC: 3.8 MMOL/L (ref 3.4–5.3)
PR INTERVAL - MUSE: 142 MS
PROT SERPL-MCNC: 7.4 G/DL (ref 6.4–8.3)
PROTHROMBIN TIME: 13.7 SECONDS (ref 11.8–14.8)
QRS DURATION - MUSE: 96 MS
QT - MUSE: 320 MS
QTC - MUSE: 427 MS
R AXIS - MUSE: 70 DEGREES
RBC # BLD AUTO: 4.89 10E6/UL (ref 3.8–5.2)
SODIUM SERPL-SCNC: 137 MMOL/L (ref 135–145)
SYSTOLIC BLOOD PRESSURE - MUSE: NORMAL MMHG
T AXIS - MUSE: 214 DEGREES
TRIGL SERPL-MCNC: 280 MG/DL
TROPONIN T SERPL HS-MCNC: 5282 NG/L
TROPONIN T SERPL HS-MCNC: ABNORMAL NG/L
VENTRICULAR RATE- MUSE: 107 BPM
WBC # BLD AUTO: 16.6 10E3/UL (ref 4–11)

## 2025-05-01 PROCEDURE — C1894 INTRO/SHEATH, NON-LASER: HCPCS | Performed by: STUDENT IN AN ORGANIZED HEALTH CARE EDUCATION/TRAINING PROGRAM

## 2025-05-01 PROCEDURE — 85347 COAGULATION TIME ACTIVATED: CPT

## 2025-05-01 PROCEDURE — 82565 ASSAY OF CREATININE: CPT

## 2025-05-01 PROCEDURE — 99152 MOD SED SAME PHYS/QHP 5/>YRS: CPT | Performed by: STUDENT IN AN ORGANIZED HEALTH CARE EDUCATION/TRAINING PROGRAM

## 2025-05-01 PROCEDURE — 250N000009 HC RX 250: Performed by: INTERNAL MEDICINE

## 2025-05-01 PROCEDURE — 94640 AIRWAY INHALATION TREATMENT: CPT

## 2025-05-01 PROCEDURE — C1725 CATH, TRANSLUMIN NON-LASER: HCPCS | Performed by: STUDENT IN AN ORGANIZED HEALTH CARE EDUCATION/TRAINING PROGRAM

## 2025-05-01 PROCEDURE — C9601 PERC DRUG-EL COR STENT BRAN: HCPCS | Mod: LD | Performed by: STUDENT IN AN ORGANIZED HEALTH CARE EDUCATION/TRAINING PROGRAM

## 2025-05-01 PROCEDURE — 250N000011 HC RX IP 250 OP 636: Performed by: EMERGENCY MEDICINE

## 2025-05-01 PROCEDURE — 200N000001 HC R&B ICU

## 2025-05-01 PROCEDURE — 36415 COLL VENOUS BLD VENIPUNCTURE: CPT

## 2025-05-01 PROCEDURE — C1874 STENT, COATED/COV W/DEL SYS: HCPCS | Performed by: STUDENT IN AN ORGANIZED HEALTH CARE EDUCATION/TRAINING PROGRAM

## 2025-05-01 PROCEDURE — 85730 THROMBOPLASTIN TIME PARTIAL: CPT | Performed by: STUDENT IN AN ORGANIZED HEALTH CARE EDUCATION/TRAINING PROGRAM

## 2025-05-01 PROCEDURE — 85610 PROTHROMBIN TIME: CPT | Performed by: STUDENT IN AN ORGANIZED HEALTH CARE EDUCATION/TRAINING PROGRAM

## 2025-05-01 PROCEDURE — C1769 GUIDE WIRE: HCPCS | Performed by: STUDENT IN AN ORGANIZED HEALTH CARE EDUCATION/TRAINING PROGRAM

## 2025-05-01 PROCEDURE — 4A023N7 MEASUREMENT OF CARDIAC SAMPLING AND PRESSURE, LEFT HEART, PERCUTANEOUS APPROACH: ICD-10-PCS | Performed by: STUDENT IN AN ORGANIZED HEALTH CARE EDUCATION/TRAINING PROGRAM

## 2025-05-01 PROCEDURE — 96375 TX/PRO/DX INJ NEW DRUG ADDON: CPT

## 2025-05-01 PROCEDURE — 99285 EMERGENCY DEPT VISIT HI MDM: CPT | Mod: 25

## 2025-05-01 PROCEDURE — 82465 ASSAY BLD/SERUM CHOLESTEROL: CPT | Performed by: STUDENT IN AN ORGANIZED HEALTH CARE EDUCATION/TRAINING PROGRAM

## 2025-05-01 PROCEDURE — 93005 ELECTROCARDIOGRAM TRACING: CPT

## 2025-05-01 PROCEDURE — 272N000054 HC CANNULA HIGH FLOW, ADULT

## 2025-05-01 PROCEDURE — 99223 1ST HOSP IP/OBS HIGH 75: CPT | Performed by: INTERNAL MEDICINE

## 2025-05-01 PROCEDURE — 93458 L HRT ARTERY/VENTRICLE ANGIO: CPT | Mod: 26 | Performed by: STUDENT IN AN ORGANIZED HEALTH CARE EDUCATION/TRAINING PROGRAM

## 2025-05-01 PROCEDURE — 92978 ENDOLUMINL IVUS OCT C 1ST: CPT | Mod: LD | Performed by: STUDENT IN AN ORGANIZED HEALTH CARE EDUCATION/TRAINING PROGRAM

## 2025-05-01 PROCEDURE — 93458 L HRT ARTERY/VENTRICLE ANGIO: CPT | Performed by: STUDENT IN AN ORGANIZED HEALTH CARE EDUCATION/TRAINING PROGRAM

## 2025-05-01 PROCEDURE — 99223 1ST HOSP IP/OBS HIGH 75: CPT | Mod: 25 | Performed by: STUDENT IN AN ORGANIZED HEALTH CARE EDUCATION/TRAINING PROGRAM

## 2025-05-01 PROCEDURE — 93005 ELECTROCARDIOGRAM TRACING: CPT | Performed by: STUDENT IN AN ORGANIZED HEALTH CARE EDUCATION/TRAINING PROGRAM

## 2025-05-01 PROCEDURE — 250N000009 HC RX 250: Mod: JW | Performed by: STUDENT IN AN ORGANIZED HEALTH CARE EDUCATION/TRAINING PROGRAM

## 2025-05-01 PROCEDURE — 71045 X-RAY EXAM CHEST 1 VIEW: CPT

## 2025-05-01 PROCEDURE — B2111ZZ FLUOROSCOPY OF MULTIPLE CORONARY ARTERIES USING LOW OSMOLAR CONTRAST: ICD-10-PCS | Performed by: STUDENT IN AN ORGANIZED HEALTH CARE EDUCATION/TRAINING PROGRAM

## 2025-05-01 PROCEDURE — 92941 PRQ TRLML REVSC TOT OCCL AMI: CPT | Mod: 22 | Performed by: STUDENT IN AN ORGANIZED HEALTH CARE EDUCATION/TRAINING PROGRAM

## 2025-05-01 PROCEDURE — 027134Z DILATION OF CORONARY ARTERY, TWO ARTERIES WITH DRUG-ELUTING INTRALUMINAL DEVICE, PERCUTANEOUS APPROACH: ICD-10-PCS | Performed by: STUDENT IN AN ORGANIZED HEALTH CARE EDUCATION/TRAINING PROGRAM

## 2025-05-01 PROCEDURE — 250N000011 HC RX IP 250 OP 636: Mod: JZ | Performed by: STUDENT IN AN ORGANIZED HEALTH CARE EDUCATION/TRAINING PROGRAM

## 2025-05-01 PROCEDURE — 250N000013 HC RX MED GY IP 250 OP 250 PS 637: Performed by: EMERGENCY MEDICINE

## 2025-05-01 PROCEDURE — 84484 ASSAY OF TROPONIN QUANT: CPT

## 2025-05-01 PROCEDURE — 255N000002 HC RX 255 OP 636: Performed by: STUDENT IN AN ORGANIZED HEALTH CARE EDUCATION/TRAINING PROGRAM

## 2025-05-01 PROCEDURE — 85025 COMPLETE CBC W/AUTO DIFF WBC: CPT

## 2025-05-01 PROCEDURE — 99233 SBSQ HOSP IP/OBS HIGH 50: CPT | Performed by: EMERGENCY MEDICINE

## 2025-05-01 PROCEDURE — 36415 COLL VENOUS BLD VENIPUNCTURE: CPT | Performed by: STUDENT IN AN ORGANIZED HEALTH CARE EDUCATION/TRAINING PROGRAM

## 2025-05-01 PROCEDURE — 999N000157 HC STATISTIC RCP TIME EA 10 MIN

## 2025-05-01 PROCEDURE — 96374 THER/PROPH/DIAG INJ IV PUSH: CPT | Mod: 59

## 2025-05-01 PROCEDURE — 250N000011 HC RX IP 250 OP 636

## 2025-05-01 PROCEDURE — 250N000013 HC RX MED GY IP 250 OP 250 PS 637: Performed by: STUDENT IN AN ORGANIZED HEALTH CARE EDUCATION/TRAINING PROGRAM

## 2025-05-01 PROCEDURE — C1887 CATHETER, GUIDING: HCPCS | Performed by: STUDENT IN AN ORGANIZED HEALTH CARE EDUCATION/TRAINING PROGRAM

## 2025-05-01 PROCEDURE — 92978 ENDOLUMINL IVUS OCT C 1ST: CPT | Mod: 26 | Performed by: STUDENT IN AN ORGANIZED HEALTH CARE EDUCATION/TRAINING PROGRAM

## 2025-05-01 PROCEDURE — 83735 ASSAY OF MAGNESIUM: CPT

## 2025-05-01 PROCEDURE — 83690 ASSAY OF LIPASE: CPT

## 2025-05-01 PROCEDURE — 272N000001 HC OR GENERAL SUPPLY STERILE: Performed by: STUDENT IN AN ORGANIZED HEALTH CARE EDUCATION/TRAINING PROGRAM

## 2025-05-01 PROCEDURE — C1753 CATH, INTRAVAS ULTRASOUND: HCPCS | Performed by: STUDENT IN AN ORGANIZED HEALTH CARE EDUCATION/TRAINING PROGRAM

## 2025-05-01 PROCEDURE — C9606 PERC D-E COR REVASC W AMI S: HCPCS | Performed by: STUDENT IN AN ORGANIZED HEALTH CARE EDUCATION/TRAINING PROGRAM

## 2025-05-01 PROCEDURE — 82248 BILIRUBIN DIRECT: CPT

## 2025-05-01 PROCEDURE — 92929 PR PRQ TRLUML CORONARY BM STENT W/ANGIO ADDL ART/BRNCH: CPT | Mod: LD | Performed by: STUDENT IN AN ORGANIZED HEALTH CARE EDUCATION/TRAINING PROGRAM

## 2025-05-01 PROCEDURE — 99153 MOD SED SAME PHYS/QHP EA: CPT | Performed by: STUDENT IN AN ORGANIZED HEALTH CARE EDUCATION/TRAINING PROGRAM

## 2025-05-01 PROCEDURE — 999N000215 HC STATISTIC HFNC ADULT NON-CPAP

## 2025-05-01 DEVICE — STENT CORONARY DES SYNERGY XD MR US 2.50X8MM H7493941808250: Type: IMPLANTABLE DEVICE | Site: CORONARY | Status: FUNCTIONAL

## 2025-05-01 DEVICE — STENT CORONARY DES SYNERGY XD MR US 3.00X20MM H7493941820300: Type: IMPLANTABLE DEVICE | Site: CORONARY | Status: FUNCTIONAL

## 2025-05-01 DEVICE — STENT CORONARY DES SYNERGY XD MR US 2.50X28MM H7493941828250: Type: IMPLANTABLE DEVICE | Site: CORONARY | Status: FUNCTIONAL

## 2025-05-01 RX ORDER — BUPROPION HYDROCHLORIDE 150 MG/1
300 TABLET ORAL EVERY MORNING
Status: DISCONTINUED | OUTPATIENT
Start: 2025-05-02 | End: 2025-05-12 | Stop reason: HOSPADM

## 2025-05-01 RX ORDER — NALOXONE HYDROCHLORIDE 0.4 MG/ML
0.4 INJECTION, SOLUTION INTRAMUSCULAR; INTRAVENOUS; SUBCUTANEOUS
Status: ACTIVE | OUTPATIENT
Start: 2025-05-01 | End: 2025-05-02

## 2025-05-01 RX ORDER — ALBUTEROL SULFATE 90 UG/1
2 INHALANT RESPIRATORY (INHALATION) EVERY 4 HOURS PRN
COMMUNITY

## 2025-05-01 RX ORDER — MULTIPLE VITAMINS W/ MINERALS TAB 9MG-400MCG
1 TAB ORAL DAILY
COMMUNITY

## 2025-05-01 RX ORDER — ASPIRIN 81 MG/1
81 TABLET, CHEWABLE ORAL DAILY
Qty: 30 TABLET | Refills: 3 | Status: SHIPPED | OUTPATIENT
Start: 2025-05-01 | End: 2025-05-12

## 2025-05-01 RX ORDER — LEVOTHYROXINE SODIUM 125 UG/1
125 TABLET ORAL
COMMUNITY

## 2025-05-01 RX ORDER — ATROPINE SULFATE 0.1 MG/ML
0.5 INJECTION INTRAVENOUS
Status: ACTIVE | OUTPATIENT
Start: 2025-05-01 | End: 2025-05-02

## 2025-05-01 RX ORDER — NICOTINE POLACRILEX 4 MG
15-30 LOZENGE BUCCAL
Status: DISCONTINUED | OUTPATIENT
Start: 2025-05-01 | End: 2025-05-12 | Stop reason: HOSPADM

## 2025-05-01 RX ORDER — ONDANSETRON 4 MG/1
4 TABLET, ORALLY DISINTEGRATING ORAL EVERY 6 HOURS PRN
Status: DISCONTINUED | OUTPATIENT
Start: 2025-05-01 | End: 2025-05-12 | Stop reason: HOSPADM

## 2025-05-01 RX ORDER — ONDANSETRON 4 MG/1
4 TABLET, ORALLY DISINTEGRATING ORAL EVERY 8 HOURS PRN
COMMUNITY

## 2025-05-01 RX ORDER — METFORMIN HYDROCHLORIDE 500 MG/1
1000 TABLET, EXTENDED RELEASE ORAL 2 TIMES DAILY WITH MEALS
Status: ON HOLD | COMMUNITY
End: 2025-05-12

## 2025-05-01 RX ORDER — ASPIRIN 81 MG/1
81 TABLET ORAL DAILY
Status: DISCONTINUED | OUTPATIENT
Start: 2025-05-02 | End: 2025-05-02

## 2025-05-01 RX ORDER — ALBUTEROL SULFATE 0.83 MG/ML
2.5 SOLUTION RESPIRATORY (INHALATION) EVERY 4 HOURS PRN
Status: DISCONTINUED | OUTPATIENT
Start: 2025-05-01 | End: 2025-05-01

## 2025-05-01 RX ORDER — FLUMAZENIL 0.1 MG/ML
0.2 INJECTION, SOLUTION INTRAVENOUS
Status: ACTIVE | OUTPATIENT
Start: 2025-05-01 | End: 2025-05-02

## 2025-05-01 RX ORDER — NALOXONE HYDROCHLORIDE 0.4 MG/ML
0.2 INJECTION, SOLUTION INTRAMUSCULAR; INTRAVENOUS; SUBCUTANEOUS
Status: ACTIVE | OUTPATIENT
Start: 2025-05-01 | End: 2025-05-02

## 2025-05-01 RX ORDER — QUETIAPINE FUMARATE 25 MG/1
25 TABLET, FILM COATED ORAL AT BEDTIME
Status: ON HOLD | COMMUNITY
End: 2025-05-12

## 2025-05-01 RX ORDER — QUETIAPINE FUMARATE 25 MG/1
25 TABLET, FILM COATED ORAL AT BEDTIME
Status: DISCONTINUED | OUTPATIENT
Start: 2025-05-01 | End: 2025-05-03

## 2025-05-01 RX ORDER — IPRATROPIUM BROMIDE AND ALBUTEROL SULFATE 2.5; .5 MG/3ML; MG/3ML
1 SOLUTION RESPIRATORY (INHALATION) EVERY 6 HOURS PRN
Status: DISCONTINUED | OUTPATIENT
Start: 2025-05-01 | End: 2025-05-12 | Stop reason: HOSPADM

## 2025-05-01 RX ORDER — FUROSEMIDE 10 MG/ML
INJECTION INTRAMUSCULAR; INTRAVENOUS
Status: DISCONTINUED | OUTPATIENT
Start: 2025-05-01 | End: 2025-05-01 | Stop reason: HOSPADM

## 2025-05-01 RX ORDER — BUPROPION HYDROCHLORIDE 300 MG/1
300 TABLET ORAL EVERY MORNING
COMMUNITY

## 2025-05-01 RX ORDER — FLUTICASONE FUROATE AND VILANTEROL 200; 25 UG/1; UG/1
1 POWDER RESPIRATORY (INHALATION) DAILY
Status: DISCONTINUED | OUTPATIENT
Start: 2025-05-02 | End: 2025-05-12 | Stop reason: HOSPADM

## 2025-05-01 RX ORDER — FENTANYL CITRATE 50 UG/ML
25 INJECTION, SOLUTION INTRAMUSCULAR; INTRAVENOUS
Status: COMPLETED | OUTPATIENT
Start: 2025-05-01 | End: 2025-05-03

## 2025-05-01 RX ORDER — TOPIRAMATE 25 MG/1
25 TABLET, FILM COATED ORAL 2 TIMES DAILY PRN
Status: DISCONTINUED | OUTPATIENT
Start: 2025-05-01 | End: 2025-05-12 | Stop reason: HOSPADM

## 2025-05-01 RX ORDER — CARVEDILOL 12.5 MG/1
25 TABLET ORAL 2 TIMES DAILY WITH MEALS
Status: DISCONTINUED | OUTPATIENT
Start: 2025-05-02 | End: 2025-05-02

## 2025-05-01 RX ORDER — NITROGLYCERIN 5 MG/ML
VIAL (ML) INTRAVENOUS
Status: DISCONTINUED | OUTPATIENT
Start: 2025-05-01 | End: 2025-05-01 | Stop reason: HOSPADM

## 2025-05-01 RX ORDER — FUROSEMIDE 10 MG/ML
40 INJECTION INTRAMUSCULAR; INTRAVENOUS EVERY 12 HOURS
Status: DISCONTINUED | OUTPATIENT
Start: 2025-05-02 | End: 2025-05-02

## 2025-05-01 RX ORDER — NITROGLYCERIN 0.4 MG/1
0.4 TABLET SUBLINGUAL EVERY 5 MIN PRN
Status: DISCONTINUED | OUTPATIENT
Start: 2025-05-01 | End: 2025-05-12 | Stop reason: HOSPADM

## 2025-05-01 RX ORDER — IPRATROPIUM BROMIDE AND ALBUTEROL SULFATE 2.5; .5 MG/3ML; MG/3ML
1 SOLUTION RESPIRATORY (INHALATION) EVERY 6 HOURS PRN
COMMUNITY

## 2025-05-01 RX ORDER — FENTANYL CITRATE-0.9 % NACL/PF 10 MCG/ML
PLASTIC BAG, INJECTION (ML) INTRAVENOUS
Status: DISCONTINUED | OUTPATIENT
Start: 2025-05-01 | End: 2025-05-01 | Stop reason: HOSPADM

## 2025-05-01 RX ORDER — VENLAFAXINE HYDROCHLORIDE 75 MG/1
75 CAPSULE, EXTENDED RELEASE ORAL DAILY
Status: DISCONTINUED | OUTPATIENT
Start: 2025-05-02 | End: 2025-05-12 | Stop reason: HOSPADM

## 2025-05-01 RX ORDER — METOPROLOL TARTRATE 1 MG/ML
5 INJECTION, SOLUTION INTRAVENOUS
Status: DISCONTINUED | OUTPATIENT
Start: 2025-05-01 | End: 2025-05-12 | Stop reason: HOSPADM

## 2025-05-01 RX ORDER — OXYCODONE HYDROCHLORIDE 5 MG/1
10 TABLET ORAL EVERY 4 HOURS PRN
Status: DISCONTINUED | OUTPATIENT
Start: 2025-05-01 | End: 2025-05-12 | Stop reason: HOSPADM

## 2025-05-01 RX ORDER — NYSTATIN 100000 [USP'U]/G
POWDER TOPICAL 2 TIMES DAILY PRN
COMMUNITY

## 2025-05-01 RX ORDER — BUPRENORPHINE 5 UG/H
1 PATCH TRANSDERMAL
Status: ON HOLD | COMMUNITY
End: 2025-05-12

## 2025-05-01 RX ORDER — METHOCARBAMOL 500 MG/1
500 TABLET, FILM COATED ORAL EVERY 6 HOURS PRN
Status: DISCONTINUED | OUTPATIENT
Start: 2025-05-01 | End: 2025-05-12 | Stop reason: HOSPADM

## 2025-05-01 RX ORDER — ACETAMINOPHEN 500 MG
1000 TABLET ORAL EVERY 6 HOURS PRN
Status: DISCONTINUED | OUTPATIENT
Start: 2025-05-01 | End: 2025-05-01

## 2025-05-01 RX ORDER — LIDOCAINE 4 G/G
1 PATCH TOPICAL EVERY 24 HOURS
Status: DISCONTINUED | OUTPATIENT
Start: 2025-05-01 | End: 2025-05-06

## 2025-05-01 RX ORDER — LISINOPRIL 20 MG/1
20 TABLET ORAL DAILY
Status: DISCONTINUED | OUTPATIENT
Start: 2025-05-01 | End: 2025-05-02

## 2025-05-01 RX ORDER — LEVOTHYROXINE SODIUM 125 UG/1
125 TABLET ORAL
Status: DISCONTINUED | OUTPATIENT
Start: 2025-05-02 | End: 2025-05-12 | Stop reason: HOSPADM

## 2025-05-01 RX ORDER — VALACYCLOVIR HYDROCHLORIDE 500 MG/1
500 TABLET, FILM COATED ORAL DAILY
Status: DISCONTINUED | OUTPATIENT
Start: 2025-05-02 | End: 2025-05-12 | Stop reason: HOSPADM

## 2025-05-01 RX ORDER — LIDOCAINE 4 G/G
1 PATCH TOPICAL EVERY 24 HOURS
COMMUNITY

## 2025-05-01 RX ORDER — BUSPIRONE HYDROCHLORIDE 5 MG/1
5 TABLET ORAL 3 TIMES DAILY
COMMUNITY

## 2025-05-01 RX ORDER — TICAGRELOR 90 MG/1
180 TABLET, FILM COATED ORAL ONCE
Status: COMPLETED | OUTPATIENT
Start: 2025-05-01 | End: 2025-05-01

## 2025-05-01 RX ORDER — FORMOTEROL FUMARATE 20 UG/2ML
20 SOLUTION RESPIRATORY (INHALATION)
Status: DISCONTINUED | OUTPATIENT
Start: 2025-05-01 | End: 2025-05-08

## 2025-05-01 RX ORDER — ALBUTEROL SULFATE 90 UG/1
2 INHALANT RESPIRATORY (INHALATION) EVERY 4 HOURS PRN
Status: DISCONTINUED | OUTPATIENT
Start: 2025-05-01 | End: 2025-05-12 | Stop reason: HOSPADM

## 2025-05-01 RX ORDER — TICAGRELOR 90 MG/1
90 TABLET, FILM COATED ORAL 2 TIMES DAILY
Qty: 180 TABLET | Refills: 3 | Status: SHIPPED | OUTPATIENT
Start: 2025-05-02 | End: 2025-05-12

## 2025-05-01 RX ORDER — AMLODIPINE BESYLATE 2.5 MG/1
2.5 TABLET ORAL DAILY
Status: DISCONTINUED | OUTPATIENT
Start: 2025-05-02 | End: 2025-05-02

## 2025-05-01 RX ORDER — PANTOPRAZOLE SODIUM 20 MG/1
20 TABLET, DELAYED RELEASE ORAL DAILY
COMMUNITY

## 2025-05-01 RX ORDER — FENTANYL CITRATE 50 UG/ML
INJECTION, SOLUTION INTRAMUSCULAR; INTRAVENOUS
Status: DISCONTINUED | OUTPATIENT
Start: 2025-05-01 | End: 2025-05-01 | Stop reason: HOSPADM

## 2025-05-01 RX ORDER — BUSPIRONE HYDROCHLORIDE 5 MG/1
5 TABLET ORAL 3 TIMES DAILY
Status: DISCONTINUED | OUTPATIENT
Start: 2025-05-01 | End: 2025-05-12 | Stop reason: HOSPADM

## 2025-05-01 RX ORDER — ACETAMINOPHEN 500 MG
1000 TABLET ORAL EVERY 6 HOURS PRN
COMMUNITY

## 2025-05-01 RX ORDER — GABAPENTIN 100 MG/1
300 CAPSULE ORAL 3 TIMES DAILY
COMMUNITY

## 2025-05-01 RX ORDER — BUPRENORPHINE 5 UG/H
1 PATCH TRANSDERMAL WEEKLY
Status: DISCONTINUED | OUTPATIENT
Start: 2025-05-02 | End: 2025-05-12 | Stop reason: HOSPADM

## 2025-05-01 RX ORDER — ASPIRIN 300 MG/1
300 SUPPOSITORY RECTAL ONCE
Status: DISCONTINUED | OUTPATIENT
Start: 2025-05-01 | End: 2025-05-01

## 2025-05-01 RX ORDER — VENLAFAXINE HYDROCHLORIDE 75 MG/1
75 CAPSULE, EXTENDED RELEASE ORAL DAILY
COMMUNITY

## 2025-05-01 RX ORDER — TOPIRAMATE 25 MG/1
25 TABLET, FILM COATED ORAL 2 TIMES DAILY PRN
COMMUNITY

## 2025-05-01 RX ORDER — ACETAMINOPHEN 325 MG/1
650 TABLET ORAL EVERY 4 HOURS PRN
Status: DISCONTINUED | OUTPATIENT
Start: 2025-05-01 | End: 2025-05-02

## 2025-05-01 RX ORDER — OXYCODONE HYDROCHLORIDE 5 MG/1
5 TABLET ORAL EVERY 4 HOURS PRN
Status: DISCONTINUED | OUTPATIENT
Start: 2025-05-01 | End: 2025-05-12 | Stop reason: HOSPADM

## 2025-05-01 RX ORDER — HYDRALAZINE HYDROCHLORIDE 20 MG/ML
10 INJECTION INTRAMUSCULAR; INTRAVENOUS EVERY 4 HOURS PRN
Status: DISCONTINUED | OUTPATIENT
Start: 2025-05-01 | End: 2025-05-12 | Stop reason: HOSPADM

## 2025-05-01 RX ORDER — PERPHENAZINE 16 MG
600 TABLET ORAL DAILY
COMMUNITY

## 2025-05-01 RX ORDER — ONDANSETRON 4 MG/1
4 TABLET, ORALLY DISINTEGRATING ORAL EVERY 8 HOURS PRN
Status: DISCONTINUED | OUTPATIENT
Start: 2025-05-01 | End: 2025-05-01

## 2025-05-01 RX ORDER — TRAZODONE HYDROCHLORIDE 50 MG/1
50 TABLET ORAL
COMMUNITY

## 2025-05-01 RX ORDER — IPRATROPIUM BROMIDE AND ALBUTEROL SULFATE 2.5; .5 MG/3ML; MG/3ML
3 SOLUTION RESPIRATORY (INHALATION)
Status: DISCONTINUED | OUTPATIENT
Start: 2025-05-01 | End: 2025-05-01

## 2025-05-01 RX ORDER — TICAGRELOR 90 MG/1
90 TABLET, FILM COATED ORAL EVERY 12 HOURS
Status: DISCONTINUED | OUTPATIENT
Start: 2025-05-02 | End: 2025-05-07

## 2025-05-01 RX ORDER — ONDANSETRON 2 MG/ML
4 INJECTION INTRAMUSCULAR; INTRAVENOUS EVERY 6 HOURS PRN
Status: DISCONTINUED | OUTPATIENT
Start: 2025-05-01 | End: 2025-05-12 | Stop reason: HOSPADM

## 2025-05-01 RX ORDER — ASPIRIN 81 MG/1
324 TABLET, CHEWABLE ORAL DAILY
Status: DISCONTINUED | OUTPATIENT
Start: 2025-05-01 | End: 2025-05-01 | Stop reason: ALTCHOICE

## 2025-05-01 RX ORDER — ROSUVASTATIN CALCIUM 40 MG/1
40 TABLET, COATED ORAL DAILY
Qty: 90 TABLET | Refills: 3 | Status: SHIPPED | OUTPATIENT
Start: 2025-05-01 | End: 2025-05-12

## 2025-05-01 RX ORDER — PANTOPRAZOLE SODIUM 20 MG/1
20 TABLET, DELAYED RELEASE ORAL DAILY
Status: DISCONTINUED | OUTPATIENT
Start: 2025-05-02 | End: 2025-05-03

## 2025-05-01 RX ORDER — DEXTROSE MONOHYDRATE 25 G/50ML
25-50 INJECTION, SOLUTION INTRAVENOUS
Status: DISCONTINUED | OUTPATIENT
Start: 2025-05-01 | End: 2025-05-12 | Stop reason: HOSPADM

## 2025-05-01 RX ORDER — CARVEDILOL 25 MG/1
25 TABLET ORAL 2 TIMES DAILY WITH MEALS
Status: ON HOLD | COMMUNITY
End: 2025-05-12

## 2025-05-01 RX ORDER — HEPARIN SODIUM 1000 [USP'U]/ML
INJECTION, SOLUTION INTRAVENOUS; SUBCUTANEOUS
Status: DISCONTINUED | OUTPATIENT
Start: 2025-05-01 | End: 2025-05-01 | Stop reason: HOSPADM

## 2025-05-01 RX ORDER — SODIUM CHLORIDE 9 MG/ML
INJECTION, SOLUTION INTRAVENOUS CONTINUOUS
Status: DISCONTINUED | OUTPATIENT
Start: 2025-05-01 | End: 2025-05-01

## 2025-05-01 RX ORDER — BUDESONIDE 0.5 MG/2ML
0.5 INHALANT ORAL
Status: DISCONTINUED | OUTPATIENT
Start: 2025-05-01 | End: 2025-05-08

## 2025-05-01 RX ORDER — IODIXANOL 320 MG/ML
INJECTION, SOLUTION INTRAVASCULAR
Status: DISCONTINUED | OUTPATIENT
Start: 2025-05-01 | End: 2025-05-01 | Stop reason: HOSPADM

## 2025-05-01 RX ORDER — TRAZODONE HYDROCHLORIDE 50 MG/1
50 TABLET ORAL
Status: DISCONTINUED | OUTPATIENT
Start: 2025-05-01 | End: 2025-05-12 | Stop reason: HOSPADM

## 2025-05-01 RX ORDER — METHOCARBAMOL 500 MG/1
500 TABLET, FILM COATED ORAL EVERY 6 HOURS PRN
COMMUNITY

## 2025-05-01 RX ORDER — ASPIRIN 81 MG/1
81 TABLET, CHEWABLE ORAL ONCE
Status: DISCONTINUED | OUTPATIENT
Start: 2025-05-01 | End: 2025-05-01

## 2025-05-01 RX ORDER — LISINOPRIL 30 MG/1
30 TABLET ORAL DAILY
Status: ON HOLD | COMMUNITY
End: 2025-05-12

## 2025-05-01 RX ORDER — HEPARIN SODIUM 200 [USP'U]/100ML
INJECTION, SOLUTION INTRAVENOUS
Status: DISCONTINUED | OUTPATIENT
Start: 2025-05-01 | End: 2025-05-01 | Stop reason: HOSPADM

## 2025-05-01 RX ORDER — METFORMIN HYDROCHLORIDE 500 MG/1
1000 TABLET, EXTENDED RELEASE ORAL 2 TIMES DAILY WITH MEALS
Status: DISCONTINUED | OUTPATIENT
Start: 2025-05-02 | End: 2025-05-12 | Stop reason: HOSPADM

## 2025-05-01 RX ORDER — ROSUVASTATIN CALCIUM 40 MG/1
40 TABLET, COATED ORAL EVERY EVENING
Status: DISCONTINUED | OUTPATIENT
Start: 2025-05-01 | End: 2025-05-05

## 2025-05-01 RX ORDER — GABAPENTIN 300 MG/1
300 CAPSULE ORAL 3 TIMES DAILY
Status: DISCONTINUED | OUTPATIENT
Start: 2025-05-01 | End: 2025-05-12 | Stop reason: HOSPADM

## 2025-05-01 RX ORDER — PHENOL 1.4 %
10 AEROSOL, SPRAY (ML) MUCOUS MEMBRANE AT BEDTIME
COMMUNITY

## 2025-05-01 RX ORDER — ONDANSETRON 2 MG/ML
4 INJECTION INTRAMUSCULAR; INTRAVENOUS ONCE
Status: COMPLETED | OUTPATIENT
Start: 2025-05-01 | End: 2025-05-01

## 2025-05-01 RX ADMIN — ONDANSETRON 4 MG: 2 INJECTION, SOLUTION INTRAMUSCULAR; INTRAVENOUS at 16:17

## 2025-05-01 RX ADMIN — ASPIRIN 81 MG CHEWABLE TABLET 324 MG: 81 TABLET CHEWABLE at 16:21

## 2025-05-01 RX ADMIN — FAMOTIDINE 20 MG: 10 INJECTION, SOLUTION INTRAVENOUS at 16:18

## 2025-05-01 RX ADMIN — HEPARIN SODIUM 5000 UNITS: 1000 INJECTION, SOLUTION INTRAVENOUS; SUBCUTANEOUS at 16:21

## 2025-05-01 RX ADMIN — IPRATROPIUM BROMIDE AND ALBUTEROL SULFATE 3 ML: .5; 3 SOLUTION RESPIRATORY (INHALATION) at 20:27

## 2025-05-01 RX ADMIN — ACETAMINOPHEN 650 MG: 325 TABLET ORAL at 20:46

## 2025-05-01 RX ADMIN — TICAGRELOR 180 MG: 90 TABLET ORAL at 16:22

## 2025-05-01 RX ADMIN — BUDESONIDE 0.5 MG: 0.5 INHALANT ORAL at 20:27

## 2025-05-01 RX ADMIN — OXYCODONE HYDROCHLORIDE 5 MG: 5 TABLET ORAL at 20:46

## 2025-05-01 ASSESSMENT — ACTIVITIES OF DAILY LIVING (ADL)
ADLS_ACUITY_SCORE: 41

## 2025-05-01 ASSESSMENT — COLUMBIA-SUICIDE SEVERITY RATING SCALE - C-SSRS
6. HAVE YOU EVER DONE ANYTHING, STARTED TO DO ANYTHING, OR PREPARED TO DO ANYTHING TO END YOUR LIFE?: NO
1. IN THE PAST MONTH, HAVE YOU WISHED YOU WERE DEAD OR WISHED YOU COULD GO TO SLEEP AND NOT WAKE UP?: NO
2. HAVE YOU ACTUALLY HAD ANY THOUGHTS OF KILLING YOURSELF IN THE PAST MONTH?: NO

## 2025-05-01 NOTE — Clinical Note
First runthrough wire removed from diagonal branch. Second runthrough left in place for angioplasty.

## 2025-05-01 NOTE — Clinical Note
Blood pressure 125/85, pulse 61, weight 223 lb 6.4 oz (101.334 kg). Patient presents today complaining of frequent cold sores. His current cold sores seems to be improving. He usually uses Abreva over-the-counter with success.   Denies any genital le LCA Cine(s)  injected and visualized utilizing power injector system.

## 2025-05-01 NOTE — Clinical Note
Multiple balloon inflation. The first balloon was inserted into the left anterior descending.Max pressure = 12 mariah. Total duration = 10 seconds.     The second balloon was inserted into the Left Anterior Descending and left anterior decending diagonal. Max pressure = 12 mariah. Total duration = 10 seconds.   Max pressure = 12 mariah. Total duration = 10 seconds.
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
## 2025-05-01 NOTE — Clinical Note
Balloon inserted to left anterior descending and left anterior descending diagonal. Posterior Auricular Interpolation Flap Text: A decision was made to reconstruct the defect utilizing an interpolation axial flap and a staged reconstruction.  A telfa template was made of the defect.  This telfa template was then used to outline the posterior auricular interpolation flap.  The donor area for the pedicle flap was then injected with anesthesia.  The flap was excised through the skin and subcutaneous tissue down to the layer of the underlying musculature.  The pedicle flap was carefully excised within this deep plane to maintain its blood supply.  The edges of the donor site were undermined.   The donor site was closed in a primary fashion.  The pedicle was then rotated into position and sutured.  Once the tube was sutured into place, adequate blood supply was confirmed with blanching and refill.  The pedicle was then wrapped with xeroform gauze and dressed appropriately with a telfa and gauze bandage to ensure continued blood supply and protect the attached pedicle.

## 2025-05-01 NOTE — Clinical Note
Stent deployed in the ostium left anterior descending diagonal. Max pressure = 12 mariah. Total duration = 8 seconds.

## 2025-05-01 NOTE — Clinical Note
Stent deployed in the proximal left anterior descending. Max pressure = 14 mariah. Total duration = 12 seconds.

## 2025-05-01 NOTE — Clinical Note
The first balloon was inserted into the left anterior descending and proximal left anterior descending.Max pressure = 12 mariah. Total duration = 8 seconds.

## 2025-05-01 NOTE — Clinical Note
The first balloon was inserted into the left anterior descending and proximal left anterior descending.Max pressure = 12 mariah. Total duration = 6 seconds.     Max pressure = 16 mariah. Total duration = 9 seconds.    Balloon reinflated a second time: Max pressure = 16 mariah. Total duration = 9 seconds.  Balloon reinflated a third time: Max pressure = 18 mariah. Total duration = 11 seconds.  Balloon reinflated a fourth time: Max pressure = 18 mariah.  Total duration = 10 seconds.

## 2025-05-01 NOTE — ED TRIAGE NOTES
Patient presents here for evaluation of nausea, vomiting and loose diarrhea. She was discharged to home after staying in a nursing home for transitional care on 4/17. She was diagnosed with c-diff and began a course of antibiotics, of which she finished on 4/24. She continues to feel poorly.      Triage Assessment (Adult)       Row Name 05/01/25 1321          Triage Assessment    Airway WDL WDL        Respiratory WDL    Respiratory WDL WDL        Skin Circulation/Temperature WDL    Skin Circulation/Temperature WDL WDL        Cardiac WDL    Cardiac WDL WDL        Peripheral/Neurovascular WDL    Peripheral Neurovascular WDL WDL        Cognitive/Neuro/Behavioral WDL    Cognitive/Neuro/Behavioral WDL WDL

## 2025-05-01 NOTE — Clinical Note
Stent deployed in the proximal left anterior descending. Max pressure = 14 mariah. Total duration = 9 seconds.

## 2025-05-01 NOTE — Clinical Note
Potential access sites were evaluated for patency using ultrasound.   The right ulnar artery was selected. Access was obtained with Sonosite guidance using a micropuncture 21 gauge needle with direct visualization of needle entry.

## 2025-05-01 NOTE — Clinical Note
The first balloon was inserted into the left anterior descending and left anterior descending diagonal.Max pressure = 6 mariah. Total duration = 8 seconds.

## 2025-05-01 NOTE — Clinical Note
The first balloon was inserted into the left anterior descending and proximal left anterior descending.Max pressure = 14 mariah. Total duration = 10 seconds.     Max pressure = 16 mariah. Total duration = 12 seconds.    Balloon reinflated a second time: Max pressure = 16 mariah. Total duration = 12 seconds.

## 2025-05-01 NOTE — Clinical Note
The first balloon was inserted into the left anterior descending and proximal left anterior descending.Max pressure = 12 mariah. Total duration = 10 seconds.     Max pressure = 14 mariah. Total duration = 9 seconds.    Balloon reinflated a second time: Max pressure = 14 mariah. Total duration = 9 seconds.  Balloon reinflated a third time: Max pressure = 12 mariah. Total duration = 8 seconds.  Balloon reinflated a fourth time: Max pressure = 14 mariah.  Total duration = 9 seconds.

## 2025-05-01 NOTE — CONSULTS
"Consult    Assessment and plan: 59-year-old woman admitted with STEMI complicated by acute hypoxic respiratory failure and heart failure.  Improved post cath but still requiring some oxygen with increased work of breathing.    Pulmonary: Acute cardiogenic pulmonary edema.  Continue diuresis with Lasix.  Start CPAP.    Cardiovascular: Post PCI.  High end-diastolic pressure.  Peripheral disease not amenable to other support and she has severe diffuse disease.  - She will need a TTE tomorrow  - Continue antiplatelet  - Statin  - Blood pressure and chest pain control    Further cares per hospitalist group.  We will continue to follow from pulmonary standpoint.    Chief complaint: STEMI    HPI: Medically complex patient recently admitted for C. difficile brought in with STEMI.  Now improved after cath but still feeling somewhat short of breath.    Medications, allergies and history reviewed    On exam  BP (!) 161/74   Pulse 100   Temp 98.2  F (36.8  C) (Oral)   Resp 19   Ht 1.651 m (5' 5\")   Wt 70.3 kg (155 lb)   LMP 11/23/2012   SpO2 97%   BMI 25.79 kg/m    Neck supple  Mildly increased work of breathing  Normal mental status  Bilateral crackles  Mildly tachycardic    Labs reviewed: Normal creatinine, elevated troponin, elevated glucose, elevated white count  "

## 2025-05-01 NOTE — CONSULTS
Interventional Cardiology Consult Note    Gloria Penn is a 59 year old with a history of PAD status post AKA and nonhealing ulcers, anxiety, CKD, CAD status post multiple PCI, type 2 diabetes, hypertension, and C. difficile who presents for STEMI.    Gloria was recently admitted and just discharge for C. difficile from the TCU on April 17.  She was continuing to feel poorly with nausea, vomiting and diarrhea which prompted her evaluation here to the emergency department.  She had some chest pain which prompted an EKG which was found to have ST elevations in the anterior leads.  In the Cath Lab she had severe vascular disease with acute occlusion of the LAD that was fixed with 2 stents 1 to the diagonal and 1 to the LAD.    General: Chronically ill-appearing, appears easily 20 years older than stated age.  CV: Normal rate and rhythm. No m/r/g. No JVD.   Pulm: Increased effort, decreased breath sounds bilaterally.  Lower extremities: AKA    Labs:   Reviewed in epic.  Troponin 5282  Creatinine 0.95  Hemoglobin 11.0  Platelets 349  ALT 55    A1c 7.7%    Testing:    EKG May 1, 2025: Sinus tachycardia, anterior ST elevations with reciprocal depressions in the inferior leads (my interpretations)  Left heart catheterization May 1, 2025: Acute occlusion of the LAD, fixed with 2 stents 1 to the LAD and 1 to the diagonal.  Severe outflow disease.  Severe ISR of the right coronary artery (my interpretation)    Assessment and Plan:    1.  STEMI status post LAD and diagonal PCI.  Continue aspirin and Brilinta, after 1 year, Plavix monotherapy indefinitely.  She is extremely volume overloaded, gave 40 mg IV Lasix in the lab, will need further.  She has severe outflow disease so her stents are at risk in the long-term.  She has severe, diffuse disease that is not amenable to further PCI.  She has severe disease in her right coronary artery but this will need to be medically managed.  She has significant comorbidities  that make any further procedures extremely high risk.  Continue medical management and get a TTE to evaluate function.  Given her severe Loyda vascular disease, hemodynamic support with Impella or balloon pump is likely not an option.      Isiah Ren MD  Interventional Cardiology

## 2025-05-01 NOTE — Clinical Note
The first balloon was inserted into the left anterior descending and middle left anterior descending.Max pressure = 4 mariah. Total duration = 7 seconds.     Max pressure = 4 mariah. Total duration = 9 seconds.    Balloon reinflated a second time: Max pressure = 4 mariah. Total duration = 9 seconds.

## 2025-05-01 NOTE — ED PROVIDER NOTES
"Emergency Department Midlevel Supervisory Note     I had a face to face encounter with this patient seen by the Advanced Practice Provider (PHU). I personally made/approved the management plan and take responsibility for the patient management. I personally saw patient and performed a substantive portion of the visit including all aspects of the medical decision making.     45 minutes of critical care time     ED Course:  2:53 PM Justine Rosado PA-C staffed patient with me. I agree with their assessment and plan of management, and I will see the patient.  4:08 PM EKG showed STEMI. I met with the patient to introduce myself, gather additional history, perform my initial exam, and discuss the plan.     Brief HPI:     Gloria Penn is a 59 year old female who presents for evaluation of nausea, vomiting, and diarrhea.    Patient was recently discharged home from TCU on 4/17/2025. Was recently diagnosed with C-dif and began antibiotics which she finished on 4/24. She is continuing to feel poorly with nausea, vomiting, and diarrhea. There were no other concerns/complaints at this time.    I, Xiomara Han, am serving as a scribe to document services personally performed by Navarro Plaza MD, based on my observations and the provider's statements to me.   I,  Navarro Plaza MD, attest that Xiomara Han was acting in a scribe capacity, has observed my performance of the services and has documented them in accordance with my direction.    Brief Physical Exam: BP (!) 161/74   Pulse 100   Temp 98.2  F (36.8  C) (Oral)   Resp 19   Ht 1.651 m (5' 5\")   Wt 70.3 kg (155 lb)   LMP 11/23/2012   SpO2 97%   BMI 25.79 kg/m      Vitals: BP (!) 161/74   Pulse 100   Temp 98.2  F (36.8  C) (Oral)   Resp 19   Ht 1.651 m (5' 5\")   Wt 70.3 kg (155 lb)   LMP 11/23/2012   SpO2 97%   BMI 25.79 kg/m    General: Appears in no acute distress, awake, alert, interactive.  Actively vomiting  Eyes: Conjunctivae non-injected. Sclera " anicteric.  HENT: Atraumatic.  Neck: Supple.  Respiratory/Chest: Respiration unlabored.  Abdomen: non distended  Musculoskeletal: Right lower extremity amputation  Skin: Normal color. No rash or diaphoresis.  Neurologic: Face symmetric, moves all extremities spontaneously. Speech clear.  Psychiatric: Oriented to person, place, and time. Affect appropriate.         MDM:      Patient presents for evaluation of nausea vomiting loose diarrhea.  Discharged to home after spending time in transitional care in April 17.  Recently finished antibiotics for C. difficile.  Continues to feel poorly.  Needs assistance with activities of daily living    Patient was staffed with me for nausea vomiting diarrhea and likely admission for ADL    While here in the ER developed chest pain    EKG shows presumed new ST elevation in anterior leads no previous EKG.  Patient states she does have known heart disease with 3 stents years ago through the emergency in [facility] system    Chest pain is now resolved, when I saw patient but did have some chest pain yesterday.    Patient describes the pain as pressure does not feel it radiates anywhere    Patient is mostly concerned on her nausea    Level One STEMI activated based on EKG    Takes 81 mg aspirin but not today.  Takes Plavix but not today last dose Plavix yesterday    Given oral aspirin at patient's request.  I did order rectal aspirin but patient refused wants to try oral.     Pads were placed on patient    Chest pain resolved.  Discussed with pharmacy will give Brilinta since last dose of Plavix yesterday    I independent view chest x-ray I do not appreciate any widened mediastinum therefore heparin ordered    Patient transported to Cath Lab.      Lab called troponin 5200    The patient is critically ill and has required 37 minutes of critical care time exclusive of procedures. This includes time spent interviewing the patient, ordering tests and medications, monitoring vital signs,  reviewing results, patient updates, discussing the case with family and consultants, and admission.        ED Course as of 05/01/25 1645   Thu May 01, 2025   1621 Patient is actively vomiting       1. ACS (acute coronary syndrome) (H)    2. ST elevation myocardial infarction (STEMI), unspecified artery (H)    3. Nausea vomiting and diarrhea    4. Intermittent chest pain    5. History of Clostridioides difficile infection        Labs and Imaging:  Results for orders placed or performed during the hospital encounter of 05/01/25   XR Chest Port 1 View    Impression    IMPRESSION: Minimal basilar fibroatelectasis. Lungs are otherwise clear. No signs of pneumonia or failure. Heart is enlarged due to projection. Pulmonary vascularity is normal.   Basic metabolic panel   Result Value Ref Range    Sodium 137 135 - 145 mmol/L    Potassium 3.8 3.4 - 5.3 mmol/L    Chloride 99 98 - 107 mmol/L    Carbon Dioxide (CO2) 23 22 - 29 mmol/L    Anion Gap 15 7 - 15 mmol/L    Urea Nitrogen 18.1 8.0 - 23.0 mg/dL    Creatinine 0.95 0.51 - 0.95 mg/dL    GFR Estimate 69 >60 mL/min/1.73m2    Calcium 9.8 8.8 - 10.4 mg/dL    Glucose 239 (H) 70 - 99 mg/dL   Hepatic function panel   Result Value Ref Range    Protein Total 7.4 6.4 - 8.3 g/dL    Albumin 4.2 3.5 - 5.2 g/dL    Bilirubin Total 0.2 <=1.2 mg/dL    Alkaline Phosphatase 126 40 - 150 U/L     (H) 0 - 45 U/L    ALT 55 (H) 0 - 50 U/L    Bilirubin Direct 0.11 0.00 - 0.30 mg/dL   Result Value Ref Range    Lipase 26 13 - 60 U/L   Result Value Ref Range    Magnesium 1.2 (L) 1.7 - 2.3 mg/dL   Result Value Ref Range    Troponin T, High Sensitivity 5,282 (HH) <=14 ng/L   CBC with platelets and differential   Result Value Ref Range    WBC Count 16.6 (H) 4.0 - 11.0 10e3/uL    RBC Count 4.89 3.80 - 5.20 10e6/uL    Hemoglobin 11.0 (L) 11.7 - 15.7 g/dL    Hematocrit 36.8 35.0 - 47.0 %    MCV 75 (L) 78 - 100 fL    MCH 22.5 (L) 26.5 - 33.0 pg    MCHC 29.9 (L) 31.5 - 36.5 g/dL    RDW 16.1 (H) 10.0 -  15.0 %    Platelet Count 349 150 - 450 10e3/uL    % Neutrophils 87 %    % Lymphocytes 6 %    % Monocytes 7 %    % Eosinophils 0 %    % Basophils 0 %    % Immature Granulocytes 0 %    NRBCs per 100 WBC 0 <1 /100    Absolute Neutrophils 14.4 (H) 1.6 - 8.3 10e3/uL    Absolute Lymphocytes 1.0 0.8 - 5.3 10e3/uL    Absolute Monocytes 1.1 0.0 - 1.3 10e3/uL    Absolute Eosinophils 0.0 0.0 - 0.7 10e3/uL    Absolute Basophils 0.0 0.0 - 0.2 10e3/uL    Absolute Immature Granulocytes 0.1 <=0.4 10e3/uL    Absolute NRBCs 0.0 10e3/uL   INR   Result Value Ref Range    INR 1.03 0.85 - 1.15    PT 13.7 11.8 - 14.8 Seconds   Partial thromboplastin time   Result Value Ref Range    aPTT 29 22 - 38 Seconds       I have reviewed the relevant laboratory studies above.    I independently interpreted the following imaging study(s):   Chest x-ray and do not appreciate in a widened mediastinum    EKG: I reviewed and independently interpreted the patient's EKG, with comments made as listed below. Please see scanned EKG for full report.    Performed at: 5/1/2025 16:00:41    Impression: Sinus tachycardia, Anteroseptal infarct, possibly acute, ACUTE MI, Abnormal ECG    Rate: 101  Rhythm: Sinus rhythm  Axis: 65  AR Interval: 126  QRS Interval: 106  QTc Interval: 490  ST Changes: ST elevation anterior septal leads  Comparison: No previous      Procedures:  I was present for the key portions of procedures documented in PHU/midlevel note, see midlevel note for further details.      Navarro Plaza MD  [facility] EMERGENCY DEPARTMENT  [address] [id number] [phone]       Navarro Plaza MD  05/01/25 1418

## 2025-05-02 ENCOUNTER — APPOINTMENT (OUTPATIENT)
Dept: CT IMAGING | Facility: HOSPITAL | Age: 60
End: 2025-05-02
Payer: COMMERCIAL

## 2025-05-02 ENCOUNTER — APPOINTMENT (OUTPATIENT)
Dept: CARDIOLOGY | Facility: HOSPITAL | Age: 60
End: 2025-05-02
Attending: STUDENT IN AN ORGANIZED HEALTH CARE EDUCATION/TRAINING PROGRAM
Payer: COMMERCIAL

## 2025-05-02 VITALS
RESPIRATION RATE: 21 BRPM | OXYGEN SATURATION: 97 % | SYSTOLIC BLOOD PRESSURE: 113 MMHG | HEIGHT: 65 IN | TEMPERATURE: 99 F | DIASTOLIC BLOOD PRESSURE: 60 MMHG | HEART RATE: 102 BPM | BODY MASS INDEX: 25.83 KG/M2 | WEIGHT: 155 LBS

## 2025-05-02 LAB
ALBUMIN SERPL BCG-MCNC: 3.7 G/DL (ref 3.5–5.2)
ALP SERPL-CCNC: 94 U/L (ref 40–150)
ALT SERPL W P-5'-P-CCNC: 57 U/L (ref 0–50)
ANION GAP SERPL CALCULATED.3IONS-SCNC: 13 MMOL/L (ref 7–15)
AST SERPL W P-5'-P-CCNC: 491 U/L (ref 0–45)
ATRIAL RATE - MUSE: 105 BPM
ATRIAL RATE - MUSE: 107 BPM
BILIRUB SERPL-MCNC: 0.3 MG/DL
BUN SERPL-MCNC: 17.2 MG/DL (ref 8–23)
CALCIUM SERPL-MCNC: 8.3 MG/DL (ref 8.8–10.4)
CHLORIDE SERPL-SCNC: 103 MMOL/L (ref 98–107)
CREAT SERPL-MCNC: 0.99 MG/DL (ref 0.51–0.95)
DIASTOLIC BLOOD PRESSURE - MUSE: NORMAL MMHG
DIASTOLIC BLOOD PRESSURE - MUSE: NORMAL MMHG
EGFRCR SERPLBLD CKD-EPI 2021: 65 ML/MIN/1.73M2
ERYTHROCYTE [DISTWIDTH] IN BLOOD BY AUTOMATED COUNT: 15.9 % (ref 10–15)
GLUCOSE BLDC GLUCOMTR-MCNC: 250 MG/DL (ref 70–99)
GLUCOSE BLDC GLUCOMTR-MCNC: 262 MG/DL (ref 70–99)
GLUCOSE BLDC GLUCOMTR-MCNC: 284 MG/DL (ref 70–99)
GLUCOSE BLDC GLUCOMTR-MCNC: 321 MG/DL (ref 70–99)
GLUCOSE SERPL-MCNC: 220 MG/DL (ref 70–99)
HCO3 SERPL-SCNC: 23 MMOL/L (ref 22–29)
HCT VFR BLD AUTO: 30.6 % (ref 35–47)
HGB BLD-MCNC: 9.2 G/DL (ref 11.7–15.7)
INTERPRETATION ECG - MUSE: NORMAL
INTERPRETATION ECG - MUSE: NORMAL
LVEF ECHO: NORMAL
MAGNESIUM SERPL-MCNC: 1.1 MG/DL (ref 1.7–2.3)
MAGNESIUM SERPL-MCNC: 2.5 MG/DL (ref 1.7–2.3)
MCH RBC QN AUTO: 22.5 PG (ref 26.5–33)
MCHC RBC AUTO-ENTMCNC: 30.1 G/DL (ref 31.5–36.5)
MCV RBC AUTO: 75 FL (ref 78–100)
NT-PROBNP SERPL-MCNC: ABNORMAL PG/ML (ref 0–900)
P AXIS - MUSE: 42 DEGREES
P AXIS - MUSE: 65 DEGREES
PLATELET # BLD AUTO: 250 10E3/UL (ref 150–450)
POTASSIUM SERPL-SCNC: 3.2 MMOL/L (ref 3.4–5.3)
POTASSIUM SERPL-SCNC: 3.9 MMOL/L (ref 3.4–5.3)
PR INTERVAL - MUSE: 120 MS
PR INTERVAL - MUSE: 142 MS
PROT SERPL-MCNC: 6.3 G/DL (ref 6.4–8.3)
QRS DURATION - MUSE: 108 MS
QRS DURATION - MUSE: 96 MS
QT - MUSE: 320 MS
QT - MUSE: 380 MS
QTC - MUSE: 427 MS
QTC - MUSE: 502 MS
R AXIS - MUSE: 70 DEGREES
R AXIS - MUSE: 88 DEGREES
RBC # BLD AUTO: 4.08 10E6/UL (ref 3.8–5.2)
SODIUM SERPL-SCNC: 139 MMOL/L (ref 135–145)
SYSTOLIC BLOOD PRESSURE - MUSE: NORMAL MMHG
SYSTOLIC BLOOD PRESSURE - MUSE: NORMAL MMHG
T AXIS - MUSE: 214 DEGREES
T AXIS - MUSE: 76 DEGREES
VENTRICULAR RATE- MUSE: 105 BPM
VENTRICULAR RATE- MUSE: 107 BPM
WBC # BLD AUTO: 11.4 10E3/UL (ref 4–11)

## 2025-05-02 PROCEDURE — 250N000011 HC RX IP 250 OP 636: Performed by: INTERNAL MEDICINE

## 2025-05-02 PROCEDURE — 74177 CT ABD & PELVIS W/CONTRAST: CPT

## 2025-05-02 PROCEDURE — 250N000013 HC RX MED GY IP 250 OP 250 PS 637: Performed by: EMERGENCY MEDICINE

## 2025-05-02 PROCEDURE — 80053 COMPREHEN METABOLIC PANEL: CPT | Performed by: EMERGENCY MEDICINE

## 2025-05-02 PROCEDURE — 999N000157 HC STATISTIC RCP TIME EA 10 MIN

## 2025-05-02 PROCEDURE — 250N000009 HC RX 250: Performed by: EMERGENCY MEDICINE

## 2025-05-02 PROCEDURE — 93306 TTE W/DOPPLER COMPLETE: CPT | Mod: 26 | Performed by: GENERAL ACUTE CARE HOSPITAL

## 2025-05-02 PROCEDURE — 36415 COLL VENOUS BLD VENIPUNCTURE: CPT | Performed by: EMERGENCY MEDICINE

## 2025-05-02 PROCEDURE — 94640 AIRWAY INHALATION TREATMENT: CPT | Mod: 76

## 2025-05-02 PROCEDURE — C8929 TTE W OR WO FOL WCON,DOPPLER: HCPCS

## 2025-05-02 PROCEDURE — 85041 AUTOMATED RBC COUNT: CPT | Performed by: EMERGENCY MEDICINE

## 2025-05-02 PROCEDURE — 258N000003 HC RX IP 258 OP 636: Performed by: INTERNAL MEDICINE

## 2025-05-02 PROCEDURE — 83735 ASSAY OF MAGNESIUM: CPT | Performed by: EMERGENCY MEDICINE

## 2025-05-02 PROCEDURE — 83735 ASSAY OF MAGNESIUM: CPT | Performed by: INTERNAL MEDICINE

## 2025-05-02 PROCEDURE — 94640 AIRWAY INHALATION TREATMENT: CPT

## 2025-05-02 PROCEDURE — 93005 ELECTROCARDIOGRAM TRACING: CPT

## 2025-05-02 PROCEDURE — 250N000013 HC RX MED GY IP 250 OP 250 PS 637: Performed by: INTERNAL MEDICINE

## 2025-05-02 PROCEDURE — 250N000009 HC RX 250: Performed by: INTERNAL MEDICINE

## 2025-05-02 PROCEDURE — 272N000278 HC DEVICE 5FR SECURACATH

## 2025-05-02 PROCEDURE — 36415 COLL VENOUS BLD VENIPUNCTURE: CPT | Performed by: INTERNAL MEDICINE

## 2025-05-02 PROCEDURE — 250N000011 HC RX IP 250 OP 636

## 2025-05-02 PROCEDURE — 93010 ELECTROCARDIOGRAM REPORT: CPT | Performed by: STUDENT IN AN ORGANIZED HEALTH CARE EDUCATION/TRAINING PROGRAM

## 2025-05-02 PROCEDURE — 250N000013 HC RX MED GY IP 250 OP 250 PS 637: Performed by: STUDENT IN AN ORGANIZED HEALTH CARE EDUCATION/TRAINING PROGRAM

## 2025-05-02 PROCEDURE — 250N000012 HC RX MED GY IP 250 OP 636 PS 637: Performed by: EMERGENCY MEDICINE

## 2025-05-02 PROCEDURE — 255N000002 HC RX 255 OP 636: Performed by: STUDENT IN AN ORGANIZED HEALTH CARE EDUCATION/TRAINING PROGRAM

## 2025-05-02 PROCEDURE — 83880 ASSAY OF NATRIURETIC PEPTIDE: CPT | Performed by: INTERNAL MEDICINE

## 2025-05-02 PROCEDURE — 99233 SBSQ HOSP IP/OBS HIGH 50: CPT | Performed by: INTERNAL MEDICINE

## 2025-05-02 PROCEDURE — 99255 IP/OBS CONSLTJ NEW/EST HI 80: CPT | Performed by: INTERNAL MEDICINE

## 2025-05-02 PROCEDURE — 36569 INSJ PICC 5 YR+ W/O IMAGING: CPT

## 2025-05-02 PROCEDURE — 84132 ASSAY OF SERUM POTASSIUM: CPT | Performed by: INTERNAL MEDICINE

## 2025-05-02 PROCEDURE — 93005 ELECTROCARDIOGRAM TRACING: CPT | Performed by: STUDENT IN AN ORGANIZED HEALTH CARE EDUCATION/TRAINING PROGRAM

## 2025-05-02 PROCEDURE — 3E043XZ INTRODUCTION OF VASOPRESSOR INTO CENTRAL VEIN, PERCUTANEOUS APPROACH: ICD-10-PCS | Performed by: INTERNAL MEDICINE

## 2025-05-02 PROCEDURE — 272N000452 HC KIT SHRLOCK 5FR POWER PICC TRIPLE LUMEN

## 2025-05-02 PROCEDURE — 200N000001 HC R&B ICU

## 2025-05-02 PROCEDURE — P9047 ALBUMIN (HUMAN), 25%, 50ML: HCPCS | Performed by: INTERNAL MEDICINE

## 2025-05-02 PROCEDURE — 82533 TOTAL CORTISOL: CPT | Performed by: INTERNAL MEDICINE

## 2025-05-02 RX ORDER — NOREPINEPHRINE BITARTRATE 0.02 MG/ML
.01-.6 INJECTION, SOLUTION INTRAVENOUS CONTINUOUS
Status: DISCONTINUED | OUTPATIENT
Start: 2025-05-02 | End: 2025-05-05

## 2025-05-02 RX ORDER — LIDOCAINE 40 MG/G
CREAM TOPICAL
Status: ACTIVE | OUTPATIENT
Start: 2025-05-02 | End: 2025-05-05

## 2025-05-02 RX ORDER — NALOXONE HYDROCHLORIDE 0.4 MG/ML
0.4 INJECTION, SOLUTION INTRAMUSCULAR; INTRAVENOUS; SUBCUTANEOUS
Status: DISCONTINUED | OUTPATIENT
Start: 2025-05-02 | End: 2025-05-12 | Stop reason: HOSPADM

## 2025-05-02 RX ORDER — DOBUTAMINE HYDROCHLORIDE 200 MG/100ML
2.5 INJECTION INTRAVENOUS CONTINUOUS
Status: DISCONTINUED | OUTPATIENT
Start: 2025-05-02 | End: 2025-05-05

## 2025-05-02 RX ORDER — MAGNESIUM SULFATE 4 G/50ML
4 INJECTION INTRAVENOUS ONCE
Status: COMPLETED | OUTPATIENT
Start: 2025-05-02 | End: 2025-05-02

## 2025-05-02 RX ORDER — ALBUMIN (HUMAN) 12.5 G/50ML
25 SOLUTION INTRAVENOUS ONCE
Status: COMPLETED | OUTPATIENT
Start: 2025-05-02 | End: 2025-05-02

## 2025-05-02 RX ORDER — METOPROLOL SUCCINATE 25 MG/1
25 TABLET, EXTENDED RELEASE ORAL DAILY
Status: DISCONTINUED | OUTPATIENT
Start: 2025-05-02 | End: 2025-05-06

## 2025-05-02 RX ORDER — NALOXONE HYDROCHLORIDE 0.4 MG/ML
0.2 INJECTION, SOLUTION INTRAMUSCULAR; INTRAVENOUS; SUBCUTANEOUS
Status: DISCONTINUED | OUTPATIENT
Start: 2025-05-02 | End: 2025-05-12 | Stop reason: HOSPADM

## 2025-05-02 RX ORDER — IOPAMIDOL 755 MG/ML
90 INJECTION, SOLUTION INTRAVASCULAR ONCE
Status: COMPLETED | OUTPATIENT
Start: 2025-05-02 | End: 2025-05-02

## 2025-05-02 RX ORDER — POTASSIUM CHLORIDE 1500 MG/1
40 TABLET, EXTENDED RELEASE ORAL ONCE
Status: COMPLETED | OUTPATIENT
Start: 2025-05-02 | End: 2025-05-02

## 2025-05-02 RX ORDER — FUROSEMIDE 10 MG/ML
40 INJECTION INTRAMUSCULAR; INTRAVENOUS EVERY 12 HOURS
Status: DISCONTINUED | OUTPATIENT
Start: 2025-05-02 | End: 2025-05-07

## 2025-05-02 RX ADMIN — ALBUMIN HUMAN 25 G: 0.25 SOLUTION INTRAVENOUS at 12:49

## 2025-05-02 RX ADMIN — PERFLUTREN 2 ML: 6.52 INJECTION, SUSPENSION INTRAVENOUS at 09:15

## 2025-05-02 RX ADMIN — TICAGRELOR 90 MG: 90 TABLET ORAL at 19:57

## 2025-05-02 RX ADMIN — INSULIN GLARGINE 23 UNITS: 100 INJECTION, SOLUTION SUBCUTANEOUS at 21:52

## 2025-05-02 RX ADMIN — TRAZODONE HYDROCHLORIDE 50 MG: 50 TABLET ORAL at 00:13

## 2025-05-02 RX ADMIN — GABAPENTIN 300 MG: 300 CAPSULE ORAL at 08:31

## 2025-05-02 RX ADMIN — BUSPIRONE HYDROCHLORIDE 5 MG: 5 TABLET ORAL at 21:52

## 2025-05-02 RX ADMIN — FLUTICASONE FUROATE AND VILANTEROL TRIFENATATE 1 PUFF: 200; 25 POWDER RESPIRATORY (INHALATION) at 08:31

## 2025-05-02 RX ADMIN — VENLAFAXINE HYDROCHLORIDE 75 MG: 75 CAPSULE, EXTENDED RELEASE ORAL at 08:30

## 2025-05-02 RX ADMIN — TRAZODONE HYDROCHLORIDE 50 MG: 50 TABLET ORAL at 22:00

## 2025-05-02 RX ADMIN — MAGNESIUM SULFATE 4 G: 4 INJECTION INTRAVENOUS at 06:43

## 2025-05-02 RX ADMIN — PANTOPRAZOLE SODIUM 20 MG: 20 TABLET, DELAYED RELEASE ORAL at 08:31

## 2025-05-02 RX ADMIN — GABAPENTIN 300 MG: 300 CAPSULE ORAL at 21:51

## 2025-05-02 RX ADMIN — INSULIN ASPART 2 UNITS: 100 INJECTION, SOLUTION INTRAVENOUS; SUBCUTANEOUS at 12:47

## 2025-05-02 RX ADMIN — BUSPIRONE HYDROCHLORIDE 5 MG: 5 TABLET ORAL at 00:05

## 2025-05-02 RX ADMIN — SODIUM CHLORIDE 250 ML: 9 INJECTION, SOLUTION INTRAVENOUS at 09:49

## 2025-05-02 RX ADMIN — BUDESONIDE 0.5 MG: 0.5 INHALANT ORAL at 08:03

## 2025-05-02 RX ADMIN — IOPAMIDOL 90 ML: 755 INJECTION, SOLUTION INTRAVENOUS at 16:28

## 2025-05-02 RX ADMIN — FORMOTEROL FUMARATE DIHYDRATE 20 MCG: 20 SOLUTION RESPIRATORY (INHALATION) at 19:45

## 2025-05-02 RX ADMIN — LEVOTHYROXINE SODIUM 125 MCG: 0.12 TABLET ORAL at 06:15

## 2025-05-02 RX ADMIN — BUSPIRONE HYDROCHLORIDE 5 MG: 5 TABLET ORAL at 08:30

## 2025-05-02 RX ADMIN — INSULIN ASPART 2 UNITS: 100 INJECTION, SOLUTION INTRAVENOUS; SUBCUTANEOUS at 08:29

## 2025-05-02 RX ADMIN — INSULIN ASPART 2 UNITS: 100 INJECTION, SOLUTION INTRAVENOUS; SUBCUTANEOUS at 16:38

## 2025-05-02 RX ADMIN — NOREPINEPHRINE BITARTRATE 0.03 MCG/KG/MIN: 0.02 INJECTION, SOLUTION INTRAVENOUS at 23:52

## 2025-05-02 RX ADMIN — FORMOTEROL FUMARATE DIHYDRATE 20 MCG: 20 SOLUTION RESPIRATORY (INHALATION) at 08:04

## 2025-05-02 RX ADMIN — BUDESONIDE 0.5 MG: 0.5 INHALANT ORAL at 19:45

## 2025-05-02 RX ADMIN — ROSUVASTATIN 40 MG: 40 TABLET, FILM COATED ORAL at 21:51

## 2025-05-02 RX ADMIN — BUPROPION HYDROCHLORIDE 300 MG: 300 TABLET, EXTENDED RELEASE ORAL at 08:30

## 2025-05-02 RX ADMIN — QUETIAPINE FUMARATE 25 MG: 25 TABLET ORAL at 00:07

## 2025-05-02 RX ADMIN — DOBUTAMINE IN DEXTROSE 2.5 MCG/KG/MIN: 200 INJECTION, SOLUTION INTRAVENOUS at 15:24

## 2025-05-02 RX ADMIN — TICAGRELOR 90 MG: 90 TABLET ORAL at 06:14

## 2025-05-02 RX ADMIN — IPRATROPIUM BROMIDE AND ALBUTEROL SULFATE 3 ML: .5; 3 SOLUTION RESPIRATORY (INHALATION) at 15:10

## 2025-05-02 RX ADMIN — QUETIAPINE FUMARATE 25 MG: 25 TABLET ORAL at 21:51

## 2025-05-02 RX ADMIN — POTASSIUM CHLORIDE 40 MEQ: 1500 TABLET, EXTENDED RELEASE ORAL at 06:45

## 2025-05-02 RX ADMIN — BUSPIRONE HYDROCHLORIDE 5 MG: 5 TABLET ORAL at 13:09

## 2025-05-02 RX ADMIN — VALACYCLOVIR HYDROCHLORIDE 500 MG: 500 TABLET, FILM COATED ORAL at 08:30

## 2025-05-02 RX ADMIN — GABAPENTIN 300 MG: 300 CAPSULE ORAL at 00:06

## 2025-05-02 RX ADMIN — LIDOCAINE HYDROCHLORIDE 2 ML: 10 INJECTION, SOLUTION EPIDURAL; INFILTRATION; INTRACAUDAL; PERINEURAL at 21:35

## 2025-05-02 RX ADMIN — GABAPENTIN 300 MG: 300 CAPSULE ORAL at 13:12

## 2025-05-02 RX ADMIN — ALBUMIN HUMAN 25 G: 0.25 SOLUTION INTRAVENOUS at 03:31

## 2025-05-02 RX ADMIN — INSULIN GLARGINE 23 UNITS: 100 INJECTION, SOLUTION SUBCUTANEOUS at 00:08

## 2025-05-02 ASSESSMENT — ACTIVITIES OF DAILY LIVING (ADL)
ADLS_ACUITY_SCORE: 62
ADLS_ACUITY_SCORE: 67
ADLS_ACUITY_SCORE: 60
ADLS_ACUITY_SCORE: 67
ADLS_ACUITY_SCORE: 62
ADLS_ACUITY_SCORE: 64
ADLS_ACUITY_SCORE: 67
ADLS_ACUITY_SCORE: 62
ADLS_ACUITY_SCORE: 62
ADLS_ACUITY_SCORE: 67
ADLS_ACUITY_SCORE: 62
ADLS_ACUITY_SCORE: 62
ADLS_ACUITY_SCORE: 60
ADLS_ACUITY_SCORE: 62
ADLS_ACUITY_SCORE: 67
ADLS_ACUITY_SCORE: 62
ADLS_ACUITY_SCORE: 64
ADLS_ACUITY_SCORE: 60
ADLS_ACUITY_SCORE: 64
ADLS_ACUITY_SCORE: 67
ADLS_ACUITY_SCORE: 67

## 2025-05-02 NOTE — CONSULTS
Please see RNCM consult for CM consult details, duplicate order.     Puja Bell RN on 5/2/2025 at 2:51 PM

## 2025-05-02 NOTE — PROGRESS NOTES
Daily Progress Note    Assessment/Plan:  59-year-old female with history of PAD status post AKA and nonhealing ulcers, chronic kidney disease, anxiety, type 2 diabetes, hypertension, C. difficile and known coronary artery disease status post multiple PCI was admitted to ICU after STEMI and found to have severe occlusion of the LAD which was repaired with 1 stent to the diagonal and 1 to the LAD.  [facility] consulted for postoperative management.    Coronary artery disease, status post PCI with LAD and diagonal stenting.  Management per cardiac team.  On aspirin and Brilinta.  Also has severe RCA disease which is nonoperative due to multiple comorbidities and will need to be managed medically.  Acute cardiogenic pulmonary edema: Already significantly improved with 1 dose of IV Lasix.  Will continue for now and defer further management to cardiology.  She is off CPAP and O2 sats are greater than 90 except when she is talking.  Pulmonology following, will order 1 more dose for tomorrow morning echocardiogram tomorrow  Type 2 diabetes: Will hold home glipizide and metformin and start diabetic order set, most recent A1c 7.7 1-month ago.  Hypertension: Resume home meds when med rec complete  Hypothyroidism: Resume home meds when med rec complete  Mood disorder: Depression, anxiety.  Continue home Effexor and Seroquel  Peripheral neuropathy: Resume home meds, is on multiple medications including gabapentin, Butrans Voltaren, Robaxin and Lidoderm patch  History of C. difficile: She reports diarrhea, C. difficile toxin ordered, she has not yet produced a sample.  Elevated liver enzymes: Likely secondary to the above acute issues, will trend.  Peripheral vascular disease status post right AKA  Clinically Significant Risk Factors Present on Admission             # Hypomagnesemia: Lowest Mg = 1.2 mg/dL in last 2 days, will replace as needed     # Drug Induced Platelet Defect: home medication list includes an antiplatelet medication  "  # Hypertension: Noted on problem list          # DMII: A1C = 7.7 % (Ref range: <5.7 %) within past 6 months    # Overweight: Estimated body mass index is 25.79 kg/m  as calculated from the following:    Height as of this encounter: 1.651 m (5' 5\").    Weight as of this encounter: 70.3 kg (155 lb).       # Asthma: noted on problem list           Code status:Full Code        Barriers to Discharge: Status post STEMI, pulmonary edema.  Was recently discharged home from TCU, will order PT, OT and care management consult.  Anticipate multiday stay.        Subjective:  Gloria is off CPAP and is currently quite comfortable, not hypoxic, able to speak in complete sentences with no dyspnea.  Overall she feels much better than earlier.  Denies any chest pain.  Denies abdominal pain.  She states that she has had diarrhea but as of yet has not produced stool.  She is alert oriented comfortable appearing and talkative.        Current Medications Reviewed via EHR List    Objective:  Vital signs in last 24 hours:  [unfilled]  .prog  Weight:   @THISENCWEIGHTS(1)@  Weight change:   Body mass index is 25.79 kg/m .    Intake/Output last 3 shifts:  No intake/output data recorded.  Intake/Output this shift:  I/O this shift:  In: -   Out: 850 [Urine:850]    Physical Exam:  General: No apparent distress  CV: Regular rate and rhythm, trace bilateral pedal edema  Lungs: Decreased breath sounds with scattered rales  Abdomen: Soft, nontender        Imaging:  Personally Reviewed.  Cardiac Catheterization    Result Date: 2025  Cardiac Catheterization Report Name:  Gloria Penn        MRN: [id number] : 1965     Procedure date: May 1, 2025 Procedure: Left heart catheterization Coronary angiography Intravascular ultrasound Percutaneous coronary intervention Pre-op diagnosis: STEMI Post-op diagnosis: Same Proceduralist: Isiah Ren MD Access: 6 Dutch, right ulnar artery Catheters: JR4, EBU 3.5 Hemostasis: TR band Meds: Fentanyl " 50 mcg Midazolam 1.5 mg Heparin 7000 units Nitroglycerin 600 mcg Phenylephrine 200 mcg Lasix 40 mg Hemodynamics:        LV: EDP 35 mmHg Coronary Angiography: Coronary dominance: Right LM: 20% LAD:  P: 100%, culprit Lcx:  P: 20%  M: 80%  D: 80% OM1: 20% OM2: 80% RCA:  P: 80%  M: 80%  D: 80% PL: 100% PDA: 80% Interventional procedure: The left main coronary artery was engaged with a 6 Comoran, EBU 3.5 guide.  Systemic anticoagulation was achieved with intravenous heparin.  The lesion was wired with a Neha blue and dilated with a 2.0 x 12 mm regular balloon.  Given some irregular staining, IVUS was then completed which showed a diffusely diseased vessel with a distal reference diameter of 2.5 mm and proximal reference diameter of 3 mm.  The lesion was further ballooned with a 2.0 x 12 mm NC balloon.  Flow was reestablished.  A wire was then placed in the first diagonal artery.  The lesion was stented with a 2.5 x 28 mm Synergy stent.  There was some plaque shift into the diagonal which was then rewired and dilated with a 2.0 x 12 mm regular balloon with reestablish flow.  The proximal lesion was then stented with a 3.0 x 20 mm Synergy stent.  Again, the wire was removed and rewired into the diagonal artery.  The LAD stent was then dilated with a 2.5 x 12 mm NC balloon and a 3.0 x 12 mm balloon at high pressure.  There remained JOSSELYN-3 flow in the vessel but there was about an 85% lesion at the ostium of the diagonal which was then stented with a 2.5 x 8 mm Synergy stent.  Kissing balloon angioplasty was then performed with a 2.0 x 12 mm regular balloon in both limbs.  An excellent angiographic result was achieved.  Outflow to the LAD was poor as this was likely a chronically occluded vessel.  I gave nitroglycerin and Effient to pump this up, however she got severely hypotensive requiring phenylephrine.  The diagonal appears to be the dominant vessel. Pre JOSSELYN: 0 Post JOSSELYN: 3 Post stenosis: 0% Conclusions: 1.  Severely  elevated left heart filling pressures. 2.  Acute thrombotic occlusion of the left anterior descending coronary artery, culprit for STEMI. 3.  Severe diffuse in-stent restenosis of the right coronary artery. 4.  Successful recanalization and stenting of the left anterior descending coronary and first diagonal coronary artery with 2 everolimus eluding stents utilizing IVUS guidance and a reverse tap technique. Recommendations: 1.  Medical therapy to include dual antiplatelet therapy with aspirin and ticagrelor 90 mg twice daily for at least 1 year.  Following 1 year, would consider Plavix monotherapy indefinitely given her severe polyvascular disease. 2.  Aggressive diuresis. 3.  Given her severe vascular disease, overall debility, and significant comorbidities I recommend medical management of the remainder of her coronary disease. This was a modifier 22 procedure due to severe coronary artery disease, bifurcation PCI, and hemodynamic instability. Please do not hesitate to contact me if you have any questions or concerns. I was present for the procedure in its entirety. Moderate conscious sedation at level 3-4 with fentanyl and midazolam was administered, and I spent continuous face to face time with the patient which encompassed the duration of the procedure.  Please refer to the sedation flow sheet for additional information.  I have reviewed and agree with the documentation provided in the RN sedation flow sheet as outlined. I concluded sedation and recovery was monitored by the trained independent observer. I attest that I have ordered all medications administered, equipment used, and tests performed during the procedure.     XR Chest Port 1 View    Result Date: 5/1/2025  EXAM: XR CHEST PORT 1 VIEW LOCATION: [facility] DATE: 5/1/2025 INDICATION: STEMI, chest pain COMPARISON: 2/18/2025 and older studies     IMPRESSION: Minimal basilar fibroatelectasis. Lungs are otherwise clear. No signs  "of pneumonia or failure. Heart is enlarged due to projection. Pulmonary vascularity is normal.      Lab Results:  Personally Reviewed.   Fingerstick Blood Glucose: @[id number](POCGLUFGR:10)@    Last Hbg A1C: No results found for: \"HGBA1C\"   Lab Results   Component Value Date    INR 1.03 05/01/2025    PROTIME 13.7 05/01/2025     Recent Results (from the past 24 hours)   Basic metabolic panel    Collection Time: 05/01/25  4:15 PM   Result Value Ref Range    Sodium 137 135 - 145 mmol/L    Potassium 3.8 3.4 - 5.3 mmol/L    Chloride 99 98 - 107 mmol/L    Carbon Dioxide (CO2) 23 22 - 29 mmol/L    Anion Gap 15 7 - 15 mmol/L    Urea Nitrogen 18.1 8.0 - 23.0 mg/dL    Creatinine 0.95 0.51 - 0.95 mg/dL    GFR Estimate 69 >60 mL/min/1.73m2    Calcium 9.8 8.8 - 10.4 mg/dL    Glucose 239 (H) 70 - 99 mg/dL   Hepatic function panel    Collection Time: 05/01/25  4:15 PM   Result Value Ref Range    Protein Total 7.4 6.4 - 8.3 g/dL    Albumin 4.2 3.5 - 5.2 g/dL    Bilirubin Total 0.2 <=1.2 mg/dL    Alkaline Phosphatase 126 40 - 150 U/L     (H) 0 - 45 U/L    ALT 55 (H) 0 - 50 U/L    Bilirubin Direct 0.11 0.00 - 0.30 mg/dL   Lipase    Collection Time: 05/01/25  4:15 PM   Result Value Ref Range    Lipase 26 13 - 60 U/L   Magnesium    Collection Time: 05/01/25  4:15 PM   Result Value Ref Range    Magnesium 1.2 (L) 1.7 - 2.3 mg/dL   Troponin T, High Sensitivity    Collection Time: 05/01/25  4:15 PM   Result Value Ref Range    Troponin T, High Sensitivity 5,282 (HH) <=14 ng/L   CBC with platelets and differential    Collection Time: 05/01/25  4:15 PM   Result Value Ref Range    WBC Count 16.6 (H) 4.0 - 11.0 10e3/uL    RBC Count 4.89 3.80 - 5.20 10e6/uL    Hemoglobin 11.0 (L) 11.7 - 15.7 g/dL    Hematocrit 36.8 35.0 - 47.0 %    MCV 75 (L) 78 - 100 fL    MCH 22.5 (L) 26.5 - 33.0 pg    MCHC 29.9 (L) 31.5 - 36.5 g/dL    RDW 16.1 (H) 10.0 - 15.0 %    Platelet Count 349 150 - 450 10e3/uL    % Neutrophils 87 %    % Lymphocytes 6 %    % " Monocytes 7 %    % Eosinophils 0 %    % Basophils 0 %    % Immature Granulocytes 0 %    NRBCs per 100 WBC 0 <1 /100    Absolute Neutrophils 14.4 (H) 1.6 - 8.3 10e3/uL    Absolute Lymphocytes 1.0 0.8 - 5.3 10e3/uL    Absolute Monocytes 1.1 0.0 - 1.3 10e3/uL    Absolute Eosinophils 0.0 0.0 - 0.7 10e3/uL    Absolute Basophils 0.0 0.0 - 0.2 10e3/uL    Absolute Immature Granulocytes 0.1 <=0.4 10e3/uL    Absolute NRBCs 0.0 10e3/uL   Extra Blue Top Tube    Collection Time: 05/01/25  4:15 PM   Result Value Ref Range    Hold Specimen JIC    Extra Red Top Tube    Collection Time: 05/01/25  4:15 PM   Result Value Ref Range    Hold Specimen JIC    Extra Green Top (Lithium Heparin) Tube    Collection Time: 05/01/25  4:15 PM   Result Value Ref Range    Hold Specimen JIC    Extra Purple Top Tube    Collection Time: 05/01/25  4:15 PM   Result Value Ref Range    Hold Specimen JIC    INR    Collection Time: 05/01/25  4:15 PM   Result Value Ref Range    INR 1.03 0.85 - 1.15    PT 13.7 11.8 - 14.8 Seconds   Partial thromboplastin time    Collection Time: 05/01/25  4:15 PM   Result Value Ref Range    aPTT 29 22 - 38 Seconds   Extra Blood Bank Purple Top Tube    Collection Time: 05/01/25  4:15 PM   Result Value Ref Range    Hold Specimen JIC    Lipid Profile    Collection Time: 05/01/25  4:15 PM   Result Value Ref Range    Cholesterol 121 <200 mg/dL    Triglycerides 280 (H) <150 mg/dL    Direct Measure HDL 45 (L) >=50 mg/dL    LDL Cholesterol Calculated 20 <100 mg/dL    Non HDL Cholesterol 76 <130 mg/dL   Activated clotting time celite, POCT    Collection Time: 05/01/25  5:18 PM   Result Value Ref Range    Activated Clotting Time (Celite) POCT 247 (H) 74 - 150 seconds   Activated clotting time celite, POCT    Collection Time: 05/01/25  6:09 PM   Result Value Ref Range    Activated Clotting Time (Celite) POCT 259 (H) 74 - 150 seconds   ECG 12-lead, with Muse    Collection Time: 05/01/25  7:06 PM   Result Value Ref Range    Systolic Blood  Pressure  mmHg    Diastolic Blood Pressure  mmHg    Ventricular Rate 107 BPM    Atrial Rate 107 BPM    MN Interval 142 ms    QRS Duration 96 ms     ms    QTc 427 ms    P Axis 42 degrees    R AXIS 70 degrees    T Axis 214 degrees    Interpretation ECG       ** AGE AND GENDER SPECIFIC ECG ANALYSIS **  Sinus tachycardia  Anterolateral infarct (cited on or before 01-May-2025)  T wave abnormality, consider inferior ischemia  ** ** ACUTE MI / STEMI ** **  Abnormal ECG  When compared with ECG of 01-May-2025 16:00,  Serial changes of evolving Anterior infarct Present  Serial changes of evolving Anterolateral infarct Present     Troponin T, High Sensitivity    Collection Time: 05/01/25  7:18 PM   Result Value Ref Range    Troponin T, High Sensitivity >10,000 (HH) <=14 ng/L       Medically Ready for Discharge: Anticipated in 2-4 Days       Advanced Care Planning    Medical Decision Making       50 MINUTES SPENT BY ME on the date of service doing chart review, history, exam, documentation & further activities per the note.      Isrrael Weller MD  Date: 5/1/2025  Time: 9:05 PM  [facility] Service  Family Medicine
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
## 2025-05-02 NOTE — PROVIDER NOTIFICATION
Hematoma after TR band removal.  Firmness noted up to elbow.  Primary RN is applying pressure with BP cuff.  Dr. Ren paged at this time.

## 2025-05-02 NOTE — PROGRESS NOTES
Patient was started on HFNC for shortness of breath. Patient also started on nebulizers. Patient ultimately removed HFNC due to discomfort and/or agitation. RT will continue to follow for scheduled neb therapies. Patient does not use a CPAP at home.     5/1/2025  Dolly Yun, RT

## 2025-05-02 NOTE — PLAN OF CARE
[facility] - ICU    RN Progress Note:            Pertinent Assessments:      Please refer to flowsheet rows for full assessment     Alert and oriented, sinus tachycardic, soft BP after taking HS meds, no complaints of nausea, on room air, O2 sats >92%. Right TR band site swollen, taut, large hematoma, weak doppler, better sensation, cool fingers. This am, right arm/TR band site looks better, fingers warm to touch, right radial and ulnar pulses dopplerable.Needs stool sample to be sent to lab.          Key Events - This Shift:     Dr. Mac ordered for albumin for hypotension, BP somewhat improved, will continue to monitor.     RN Managed Protocols Ordered:  yes  Protocols: potassium and magnesium  PRN'S: na  Protocols Status: potassium and magnesium replaced, discussed with oncoming RN          Barriers to Discharge / Downgrade:     Hemodynamically unstable

## 2025-05-02 NOTE — PROGRESS NOTES
[facility]    Medicine Progress Note - Hospitalist Service    Date of Admission:  5/1/2025    Assessment & Plan   59-year-old female with history of PAD status post right AKA, chronic kidney disease, anxiety, type 2 diabetes, hypertension, C. difficile and known coronary artery disease status post multiple PCI was admitted to ICU after STEMI and found to have severe occlusion of the LAD which was stented with 1 stent to the diagonal and 1 to the LAD.  [facility] consulted for postoperative management.     STEMI  Coronary artery disease  - Status post PCI to prox LAD and Ist diagonal branch.    - Management per cardiac team.  On aspirin and Brilinta.  Also has severe RCA disease which is nonoperative due to multiple comorbidities and will need to be managed medically.    Acute cardiogenic pulmonary edema  Acute hypoxic resp failure  - Already significantly improved with 1 dose of IV Lasix.  Defer further management to cardiology.  - Off of CPAP. On RA now  - Echo- EF 25-30% with anterior, septal and apical wall akinesis, no LV thrombus    Hypotension  -BP dropped to 60s/30s this morning. Mild improvement with 250ml fluid bolus and 25gm of albumin.   -Dobutamine gtt started per cardiology  - Check cortisol level    Type 2 diabetes:   - Hold home glipizide and metformin and start diabetic order set, most recent A1c 7.7 1-month ago.  - Patient requests regular diet    Hypertension:  - Home meds with hold parameters given hypotension    History of C. difficile:   - Reports diarrhea, C. difficile toxin ordered, she has not yet produced a sample.    Elevated liver enzymes:   - Likely secondary to the above acute issues, will trend.  - Elevate transaminases with normal alk phos and T bili noted. Patient is s/p lap laurence (many years ago per patient)  - Trend. CT abd/pelvis- pending  - Monitor LFTs closely since statin has been started per cards. Was on Repatha at home    Hypothyroidism: Resume home meds  "when med rec complete  Mood disorder: Depression, anxiety.  Continue home Effexor and Seroquel  Peripheral neuropathy: Resume home meds, is on multiple medications including gabapentin, Butrans Voltaren, Robaxin and Lidoderm patch  Peripheral vascular disease status post right AKA          Diet: Snacks/Supplements Adult: Magic Cup; With Meals  Regular Diet Adult    DVT Prophylaxis: Pneumatic Compression Devices  Bear Catheter: PRESENT, indication:    Lines: None     Cardiac Monitoring: ACTIVE order. Indication: Post- PCI/Angiogram (24 hours)  Code Status: Full Code      Clinically Significant Risk Factors Present on Admission        # Hypokalemia: Lowest K = 3.2 mmol/L in last 2 days, will replace as needed    # Hypocalcemia: Lowest Ca = 8.3 mg/dL in last 2 days, will monitor and replace as appropriate   # Hypomagnesemia: Lowest Mg = 1.1 mg/dL in last 2 days, will replace as needed     # Drug Induced Platelet Defect: home medication list includes an antiplatelet medication   # Hypertension: Noted on problem list  # Chronic heart failure with reduced ejection fraction: last echo with EF <40%     # Anemia: based on hgb <11      # DMII: A1C = 7.7 % (Ref range: <5.7 %) within past 6 months    # Overweight: Estimated body mass index is 25.79 kg/m  as calculated from the following:    Height as of this encounter: 1.651 m (5' 5\").    Weight as of this encounter: 70.3 kg (155 lb).       # Asthma: noted on problem list        Social Drivers of Health    Food Insecurity: No Food Insecurity (12/20/2024)    Received from [employer] [facility]    Food Insecurity     Do you worry your food will run out before you are able to buy more?: 1   Recent Concern: Food Insecurity - Food Insecurity Present (11/29/2024)    Received from [facility] [facility]    Food Insecurity     Do you worry your food will run out before you are able to buy more?: 2   Depression: At risk (11/29/2024)    " Received from [facility]    PHQ-2     PHQ-2 TOTAL SCORE: 4   Tobacco Use: High Risk (5/2/2025)    Patient History     Smoking Tobacco Use: Every Day     Smokeless Tobacco Use: Never   Physical Activity: Inactive (3/3/2023)    Received from [facility]    Exercise Vital Sign     Days of Exercise per Week: 0 days     Minutes of Exercise per Session: 0 min   Stress: Stress Concern Present (3/3/2023)    Received from [facility]    [place] [employer] - Occupational Stress Questionnaire     Feeling of Stress : To some extent   Social Connections: Socially Integrated (12/20/2024)    Received from [facility]    Social Connections     Do you often feel lonely or isolated from those around you?: 0   Recent Concern: Social Connections - Socially Isolated (11/29/2024)    Received from [facility]    Social Connections     Do you often feel lonely or isolated from those around you?: 4          Disposition Plan     Medically Ready for Discharge: Anticipated in 2-4 Days             EILEEN Thomas  Hospitalist Service  [facility]  Securely message with [employer] (more info)  Text page via [employer] Paging/Directory   ______________________________________________________________________    Interval History   Patient is new to me today. Chart reviewed. Patient seen and examined this morning in ICU. BP running low, but denies symptoms. Denies any lightheadedness or dizziness. No chest pain or shortness of breath. Reports some abdominal discomfort. Endorses having diarrhea at home prior to coming here.     Physical Exam   Vital Signs: Temp: 98.2  F (36.8  C) Temp src: Oral BP: 91/52 Pulse: 102   Resp: 24 SpO2: 97 % O2 Device: None (Room air) Oxygen Delivery: 40 LPM  Weight: 155 lbs 0 oz      General: Not in obvious distress.  HEENT: NC, AT   Chest: Clear to auscultation  bilaterally  Heart: S1S2 normal, regular. No M/R/G  Abdomen: Soft. NT, ND. Bowel sounds- active.  Extremities: No leg swelling. Right AKA  Neuro: Alert and awake, grossly non-focal      Medical Decision Making             Data     I have personally reviewed the following data over the past 24 hrs:    11.4 (H)  \   9.2 (L)   / 250     139 103 17.2 /  321 (H)   3.9 23 0.99 (H) \     ALT: 57 (H) AST: 491 (H) AP: 94 TBILI: 0.3   ALB: 3.7 TOT PROTEIN: 6.3 (L) LIPASE: 26     Trop: >10,000 (HH) BNP: 31,876 (H)     INR:  1.03 PTT:  29   D-dimer:  N/A Fibrinogen:  N/A       Imaging results reviewed over the past 24 hrs:   Recent Results (from the past 24 hours)   XR Chest Port 1 View    Narrative    EXAM: XR CHEST PORT 1 VIEW  LOCATION: [facility]  DATE: 2025    INDICATION: STEMI, chest pain  COMPARISON: 2025 and older studies      Impression    IMPRESSION: Minimal basilar fibroatelectasis. Lungs are otherwise clear. No signs of pneumonia or failure. Heart is enlarged due to projection. Pulmonary vascularity is normal.   Cardiac Catheterization    Narrative    Cardiac Catheterization Report    Name:  Gloria Penn          MRN: [id number]  : 1965       Procedure date: May 1, 2025    Procedure:  Left heart catheterization  Coronary angiography  Intravascular ultrasound  Percutaneous coronary intervention    Pre-op diagnosis:  STEMI    Post-op diagnosis:   Same     Proceduralist:   Isiah Ren MD    Access: 6 Ivorian, right ulnar artery  Catheters: JR4, EBU 3.5  Hemostasis: TR band    Meds:  Fentanyl 50 mcg  Midazolam 1.5 mg  Heparin 7000 units  Nitroglycerin 600 mcg  Phenylephrine 200 mcg  Lasix 40 mg    Hemodynamics:            LV: EDP 35 mmHg     Coronary Angiography:    Coronary dominance: Right    LM: 20%  LAD:   P: 100%, culprit  Lcx:   P: 20%   M: 80%    D: 80%  OM1: 20%  OM2: 80%  RCA:   P: 80%   M: 80%   D: 80%  PL: 100%  PDA: 80%     Interventional procedure:    The left  main coronary artery was engaged with a 6 Azeri, EBU 3.5 guide.    Systemic anticoagulation was achieved with intravenous heparin.  The   lesion was wired with a Neha blue and dilated with a 2.0 x 12 mm regular   balloon.  Given some irregular staining, IVUS was then completed which   showed a diffusely diseased vessel with a distal reference diameter of 2.5   mm and proximal reference diameter of 3 mm.  The lesion was further   ballooned with a 2.0 x 12 mm NC balloon.  Flow was reestablished.  A wire   was then placed in the first diagonal artery.  The lesion was stented with   a 2.5 x 28 mm Synergy stent.  There was some plaque shift into the   diagonal which was then rewired and dilated with a 2.0 x 12 mm regular   balloon with reestablish flow.  The proximal lesion was then stented with   a 3.0 x 20 mm Synergy stent.  Again, the wire was removed and rewired into   the diagonal artery.  The LAD stent was then dilated with a 2.5 x 12 mm NC   balloon and a 3.0 x 12 mm balloon at high pressure.  There remained JOSSELYN-3   flow in the vessel but there was about an 85% lesion at the ostium of the   diagonal which was then stented with a 2.5 x 8 mm Synergy stent.  Kissing   balloon angioplasty was then performed with a 2.0 x 12 mm regular balloon   in both limbs.  An excellent angiographic result was achieved.  Outflow to   the LAD was poor as this was likely a chronically occluded vessel.  I gave   nitroglycerin and Effient to pump this up, however she got severely   hypotensive requiring phenylephrine.  The diagonal appears to be the   dominant vessel.    Pre JOSSELYN: 0  Post JOSSELYN: 3  Post stenosis: 0%    Conclusions:     1.  Severely elevated left heart filling pressures.  2.  Acute thrombotic occlusion of the left anterior descending coronary   artery, culprit for STEMI.  3.  Severe diffuse in-stent restenosis of the right coronary artery.  4.  Successful recanalization and stenting of the left anterior descending    coronary and first diagonal coronary artery with 2 everolimus eluding   stents utilizing IVUS guidance and a reverse tap technique.    Recommendations:     1.  Medical therapy to include dual antiplatelet therapy with aspirin and   ticagrelor 90 mg twice daily for at least 1 year.  Following 1 year, would   consider Plavix monotherapy indefinitely given her severe polyvascular   disease.  2.  Aggressive diuresis.  3.  Given her severe vascular disease, overall debility, and significant   comorbidities I recommend medical management of the remainder of her   coronary disease.    This was a modifier 22 procedure due to severe coronary artery disease,   bifurcation PCI, and hemodynamic instability.    Please do not hesitate to contact me if you have any questions or   concerns. I was present for the procedure in its entirety. Moderate   conscious sedation at level 3-4 with fentanyl and midazolam was   administered, and I spent continuous face to face time with the patient   which encompassed the duration of the procedure.  Please refer to the   sedation flow sheet for additional information.  I have reviewed and agree   with the documentation provided in the RN sedation flow sheet as outlined.   I concluded sedation and recovery was monitored by the trained independent   observer. I attest that I have ordered all medications administered,   equipment used, and tests performed during the procedure.     Echocardiogram Complete   Result Value    LVEF  25-30%    Narrative    [id number]  [id number]  [id number]  [id number]^[place] [address]     Name: BECKY JIMENEZ  MRN: [id number]  : 1965  Study Date: 2025 08:52 AM  Age: 59 yrs  Gender: Female  Patient Location: [facility]  Reason For Study: STEMI  Ordering Physician: IVON PEDRO  Referring Physician: IVON PEDRO  Performed By: KS     BSA: 1.8 m2  Height: 65 in  Weight: 155 lb  HR: 98  BP: 122/58  mmHg  ______________________________________________________________________________  Procedure  Echocardiogram with two-dimensional, color and spectral Doppler. Definity (NDC  #[id number]) given intravenously. Adequate quality two-dimensional was  performed and interpreted. Adequate quality color and spectral Doppler were  performed and interpreted. There is no comparison study available.  ______________________________________________________________________________  Interpretation Summary     1. Left ventricular chamber size is normal. Mild concentric increase in wall  thickness. Systolic function is severe reduced with anterior, septal and  apical wall akinesis. The cvisually estimated left ventricular ejection  fraction is 25-30%.  2. No left ventricular thrombus.  3. Right ventricular chamber size and systolic function are normal.  4. No hemodynamically significant valvular abnormalities.  5. No prior study available for comparison.  ______________________________________________________________________________  I      WMSI = 2.38     % Normal = 0     X - Cannot   0 -                      (2) - Mildly 2 -          Segments  Size  Interpret    Hyperkinetic 1 - Normal  Hypokinetic  Hypokinetic  1-2     small                                                     7 -          3-5      moderate  3 - Akinetic 4 -          5 -         6 - Akinetic Dyskinetic   6-14    large               Dyskinetic   Aneurysmal  w/scar       w/scar       15-16   diffuse     Left Ventricle  The left ventricle is normal in size. There is mild concentric left  ventricular hypertrophy. The visual ejection fraction is 25-30%. Grade II or  moderate diastolic dysfunction. Diastolic Doppler findings (E/E' ratio and/or  other parameters) suggest left ventricular filling pressures are increased.  There is anterior, septal, and apical wall akinesis. A false chord is noted  (normal variant).     Right Ventricle  The right ventricle is normal size.  The right ventricular systolic function is  normal.     Atria  Normal left atrial size. Right atrial size is normal.     Mitral Valve  Mitral valve leaflets appear normal. There is trace mitral regurgitation.  There is no mitral valve stenosis.     Tricuspid Valve  Tricuspid valve leaflets appear normal. There is trace tricuspid  regurgitation. Right ventricular systolic pressure could not be approximated  due to inadequate tricuspid regurgitation. There is no tricuspid stenosis.     Aortic Valve  The aortic valve is trileaflet with aortic valve sclerosis. No aortic  regurgitation is present. No aortic stenosis is present.     Pulmonic Valve  The pulmonic valve is not well seen, but is grossly normal. There is trace  pulmonic valvular regurgitation. There is no pulmonic valvular stenosis.     Vessels  The aorta root is normal. The thoracic aorta is normal. IVC diameter <2.1 cm  collapsing >50% with sniff suggests a normal RA pressure of 3 mmHg.     Pericardium  There is no pericardial effusion.     Rhythm  Sinus rhythm was noted.     ______________________________________________________________________________  MMode/2D Measurements & Calculations  IVSd: 1.1 cm  LVIDd: 5.0 cm  LVIDs: 4.1 cm  LVPWd: 1.1 cm  FS: 17.9 %  LV mass(C)d: 206.8 grams  LV mass(C)dI: 116.5 grams/m2     Ao root diam: 2.7 cm  LA dimension: 3.7 cm  asc Aorta Diam: 3.2 cm  LA/Ao: 1.4  LVOT diam: 1.9 cm  LVOT area: 2.8 cm2  Ao root diam index Ht(cm/m): 1.6  Ao root diam index BSA (cm/m2): 1.5  Asc Ao diam index BSA (cm/m2): 1.8  Asc Ao diam index Ht(cm/m): 1.9  EF Biplane: 23.3 %  LA Volume (BP): 39.3 ml  LA Volume Index (BP): 22.1 ml/m2     LA Volume Indexed (AL/bp): 22.8 ml/m2  RV Base: 2.9 cm  RWT: 0.44  TAPSE: 1.9 cm     Time Measurements  MM HR: 98.0 BPM     Doppler Measurements & Calculations  MV E max maddie: 101.0 cm/sec  MV A max maddie: 84.6 cm/sec  MV E/A: 1.2  MV max P.1 mmHg  MV mean P.0 mmHg  MV V2 VTI: 25.2 cm  MVA(VTI): 1.7  cm2  MV dec slope: 760.0 cm/sec2  MV dec time: 0.13 sec  Ao V2 max: 106.0 cm/sec  Ao max P.0 mmHg  Ao V2 mean: 79.4 cm/sec  Ao mean PG: 3.0 mmHg  Ao V2 VTI: 19.0 cm  CIARA(I,D): 2.3 cm2  CIARA(V,D): 2.2 cm2  LV V1 max P.8 mmHg  LV V1 max: 83.6 cm/sec  LV V1 VTI: 15.3 cm  SV(LVOT): 43.4 ml  SI(LVOT): 24.4 ml/m2  PA V2 max: 68.5 cm/sec  PA max P.9 mmHg  PA acc time: 0.09 sec  AV Jaspal Ratio (DI): 0.79  CIARA Index (cm2/m2): 1.3  E/E': 12.6  E/E' av.0  Lateral E/e': 12.6  Medial E/e': 19.3  Peak E' Jaspal: 8.0 cm/sec  RV S Jaspal: 15.9 cm/sec     ______________________________________________________________________________  Report approved by: Dean Gonzlaez MD on 2025 02:19 PM

## 2025-05-02 NOTE — PLAN OF CARE
Problem: Cardiac Catheterization (Diagnostic/Interventional)  Goal: Absence of Bleeding  Outcome: Progressing  Goal: Stable Heart Rate and Rhythm  Outcome: Progressing  Goal: Optimal Pain Control and Function  Outcome: Progressing   Goal Outcome Evaluation:       Puncture site stable. However, entire R forearm is taut and bruised. Dr. Ren aware, pictures sent. Will continue to watch and report worsening of pain or decreased sensation. Currently, looks slightly more edematous and darker bruising when compared to before. Pt has hx of poor vasculature. Radial pulse palpable until now, nights will doppler her pulse at this time. Pt restful and less painful after oxy and tylenol. Bear placed per pt request, clear urine output. Awaiting CT scan and Echo to be done. Pt alert and oriented.

## 2025-05-02 NOTE — CONSULTS
Care Management Initial Consult    General Information  Assessment completed with: Patient, Gloria  Type of CM/SW Visit: Initial Assessment    Primary Care Provider verified and updated as needed: Yes   Readmission within the last 30 days: no previous admission in last 30 days      Reason for Consult: discharge planning  Advance Care Planning: Advance Care Planning Reviewed: other (see comments) (No ACP docs)          Communication Assessment  Patient's communication style: spoken language (English or Bilingual)             Cognitive  Cognitive/Neuro/Behavioral: WDL                      Living Environment:   People in home: alone     Current living Arrangements: apartment      Able to return to prior arrangements: other (see comments) (Depending what therapy says)       Family/Social Support:  Care provided by: self, homecare agency  Provides care for: no one, unable/limited ability to care for self  Marital Status: Single  Support system: Sibling(s), Other (specify) (Care Manager)          Description of Support System: Supportive, Involved    Support Assessment: Adequate family and caregiver support, Adequate social supports    Current Resources:   Patient receiving home care services: Yes  Skilled Home Care Services: Skilled Nursing, Physical Therapy, Occupational Therapy, Home Health Aid     Community Resources: County Work[facility], [employer] [facility], Meals on Wheels, Transportation Services  Equipment currently used at home: wheelchair, manual, walker, rolling, commode chair, shower chair  Supplies currently used at home: None    Employment/Financial:  Employment Status: disabled        Financial Concerns:             Does the patient's insurance plan have a 3 day qualifying hospital stay waiver?  No    Lifestyle & Psychosocial Needs:  Social Drivers of Health     Food Insecurity: No Food Insecurity (12/20/2024)    [facility]    Food Insecurity     Do you worry your food  will run out before you are able to buy more?: 1   Recent Concern: Food Insecurity - Food Insecurity Present (11/29/2024)    Received from [facility]    Food Insecurity     Do you worry your food will run out before you are able to buy more?: 2   Depression: At risk (11/29/2024)    Received from [facility]    PHQ-2     PHQ-2 TOTAL SCORE: 4   Housing Stability: Low Risk  (12/20/2024)    Received from [facility]    Housing Stability     What is your housing situation today?: 1   Tobacco Use: High Risk (5/2/2025)    Patient History     Smoking Tobacco Use: Every Day     Smokeless Tobacco Use: Never     Passive Exposure: Not on file   Financial Resource Strain: Low Risk  (9/25/2024)    Received from [facility]    Financial Resource Strain     Difficulty of Paying Living Expenses: 3     Difficulty of Paying Living Expenses: Not on file   Alcohol Use: Not At Risk (3/3/2023)    Received from [facility]    AUDIT-C     Frequency of Alcohol Consumption: Never     Average Number of Drinks: Not on file     Frequency of Binge Drinking: Not on file   Transportation Needs: No Transportation Needs (12/20/2024)    Received from [facility]    Transportation Needs     Does lack of transportation keep you from medical appointments?: 1     Does lack of transportation keep you from work, meetings or getting things that you need?: 1   Physical Activity: Inactive (3/3/2023)    Received from [facility]    Exercise Vital Sign     Days of Exercise per Week: 0 days     Minutes of Exercise per Session: 0 min   Interpersonal Safety: Not At Risk (3/3/2023)    Received from [facility]    Humiliation, Afraid, Rape, and Kick questionnaire     Fear of Current or Ex-Partner: No     Emotionally Abused: No     Physically Abused: No     Sexually Abused: No   Stress: Stress Concern  Present (3/3/2023)    Received from [facility]    [place] [employer]ational [employer] - Occupational Stress Questionnaire     Feeling of Stress : To some extent   Social Connections: Socially Integrated (12/20/2024)    Received from [facility]    Social Connections     Do you often feel lonely or isolated from those around you?: 0   Recent Concern: Social Connections - Socially Isolated (11/29/2024)    Received from [facility]    Social Connections     Do you often feel lonely or isolated from those around you?: 4   Health Literacy: Not on file       Functional Status:  Prior to admission patient needed assistance:   Dependent ADLs:: Ambulation-walker, Wheelchair-independent, Bathing  Dependent IADLs:: Cleaning, Shopping, Cooking, Meal Preparation, Medication Management, Transportation       Mental Health Status:  Mental Health Status: No Current Concerns       Chemical Dependency Status:  Chemical Dependency Status: No Current Concerns             Values/Beliefs:  Spiritual, Cultural Beliefs, Jewish Practices, Values that affect care:                 Discussed  Partnership in Safe Discharge Planning  document with patient/family: No    Additional Information:  Assessed. RNCM introduced self and CM role to pt. Pt alert and engaged in conversation. She verified demographics. Pt is her own decision maker, but primary contact will be her sister Sonam, writer # added to contact list. No ACP docs. Pt has an established PCP Dr. Lawrence at [facility].       Pt lives alone at [address] building, with elevator access. Pt Ind with most ADLS, but does get STBA help a couple times a week for showers [facility]. Pt also had a home RN for med mgmt, and [facility] writer sent facesheet pending to see if pt still open to services. Pt dependent on some other IADLS such as housekeeping,  "shopping, meal prep, and transport. Pt is on a CADI Waiver and she stated, \"they are working on getting her meals on wheels, and pt already has [employer] mobility. \"CADI Waiver CM is Audra #[phone]. Pt has also used family for transport and [facility] KIM Olena #[phone], has given pt rides to appointments before as well. Pt uses a 4WW in the home, has a manual w/c for longer distance, a commode, and a shower chair. Pt anticipates returning home with continued home care. Therapy consults placed, awaiting recs. Pt may need  health w/c transport at discharge time.      CARDS following pt.     Next Steps: CM will continue to monitor progression of care, review team recommendations and provide discharge planning assist as needed.       Puja Bell RN      "

## 2025-05-02 NOTE — PHARMACY-ADMISSION MEDICATION HISTORY
Pharmacist Admission Medication History    Admission medication history is complete. The information provided in this note is only as accurate as the sources available at the time of the update.    Information Source(s): Patient, Caregiver, Patient's pharmacy, Clinic records, Facility (TCU/NH/) medication list/MAR, and CareEverywhere/SureScripts via in-person and phone    Pertinent Information: Patient reported not taking any home medications 25 PTA. Asked interview to be completed at a later time when pharmacist was in ICU at 18:35. Pharmacist then called [facility] who the patient recently started following with on 25. Rafaela faxed med list , which was updated in EPIC. Pharmacist will call nurse that visited the patient home tomorrow on day shift 25 to get some clarifying questions answered.     25 [facility] Note:   Umberto     [facility] performed a start of care for Ms. Penn on . Per [facility] standards I am required to inform you of any medication discrepancies and/or values outside of [facility] standards. FYI: Upon medication reconciliation it was noted that she has the following severe drug-drug interactions:     1) carvediloL and albuterol HFA, albuterol-ipratropium    2) carvediloL and fluticasone propion-salmeteroL    In addition, we are required to notify patient's PCP if a patient is not taking her medications as prescribed. It was noted on her TCU discharge medication list that she is supposed to be takin) Asprin 81 mg daily    2) Butrans 5mcg/hour patches; one patch weekly    3) Carvedilol 25 mg BID    4) Multivitamin 1 tablet daily    *Patient did not have these meds in the home and consequently has not been taking them since she was dc'd home from [facility] on 25. I did confirm with her pharmacy that they are available for .    Patient reported that she had been taking the following medications since she  "was dc'd home from TCU on 4/17/25 even though they are not on her TCU discharge medication list:    1) Xarelto 2.5 mg BID    2) Brillinta 90 mg BID    3) Metoprolol 25 mg daily    4) Bumetanide 1 mg daily    5) Escitalopram 20 mg daily    6) stimulant 8.6-50 mg once daily (the prescription bottle did not have a specific name; it literally was labeled as \"stimulant\")    *Patient agreeable to  these meds from her other medications and not taking them anymore. These meds were places in a separate bag and relocated to a different area for now.     Patient also reports that she would like to continue taking the following medications even though they are not on her TCU discharge medication list:    1) Topimax 25 mg PO BID PRN headaches    2) Wellbutrin  mg once daily    3) Zofran 4mg q 8hr PRN nausea    4) Repatha 140mg/ml; 1 dose SQ every 2 weeks    *I educated patient on need to discuss with PCP first due to these meds are not on most recent medication list sent home from TCU.      Changes made to PTA medication list:  Added: Alpha Lipoic Acid, Buprenorphine, carvedilol, diclofenac gel, repatha, gabapentin, lantus, duoneb, lidocaine patch, nystatin, zofran odt, quetiapine, topiramate, trazodone, venlafaxine, pantoprazole  Deleted: Albuterol neb solution, amlodipine, atenolol, calcium-vitamin d, Vitamin D, docusate, Flovent, glipizide, hydrochlorothiazide, metoclopramide, montelukast, new mew otc allergy med, omeprazole, sertraline, simvastatin, januvia, Tramadol  Changed: Lisinopril from 20 mg to 30 mg daily. Metformin from 1000 mg BID to Metformin ER 1000 mg BID. Levothyroxine from 175 mcg to 125 mcg daily.     Allergies reviewed with patient and updates made in EHR: yes    Medication History Completed By: SHERI JONES RP 5/1/2025 10:14 PM    PTA Med List   Medication Sig Last Dose/Taking    acetaminophen (TYLENOL) 500 MG tablet Take 1,000 mg by mouth every 6 hours as needed for mild pain. Past " Month    albuterol (PROAIR HFA/PROVENTIL HFA/VENTOLIN HFA) 108 (90 Base) MCG/ACT inhaler Inhale 2 puffs into the lungs every 4 hours as needed for shortness of breath, wheezing or cough. Past Month    alpha-lipoic acid 600 MG capsule Take 600 mg by mouth daily. Past Week Morning    aspirin (ASA) 81 MG chewable tablet Take 1 tablet (81 mg) by mouth daily. Starting tomorrow. Taking    aspirin 81 MG tablet Take 1 tablet by mouth daily. Past Week Morning    buprenorphine (BUTRANS) 5 MCG/HR WK patch Place 1 patch onto the skin every 7 days. Past Month Morning    buPROPion (WELLBUTRIN XL) 300 MG 24 hr tablet Take 300 mg by mouth every morning. Past Week Morning    busPIRone (BUSPAR) 5 MG tablet Take 5 mg by mouth 3 times daily. Past Week Morning    carvedilol (COREG) 25 MG tablet Take 25 mg by mouth 2 times daily (with meals). Past Week Evening    diclofenac (VOLTAREN) 1 % topical gel Apply 2 g topically 4 times daily as needed for moderate pain. Past Month Evening    evolocumab (REPATHA) 140 MG/ML prefilled autoinjector Inject 140 mg subcutaneously every 14 days. Past Month    fluticasone-salmeterol (ADVAIR DISKUS) 500-50 MCG/DOSE diskus inhaler Inhale 1 puff into the lungs every 12 hours. Past Month Morning    gabapentin (NEURONTIN) 100 MG capsule Take 300 mg by mouth 3 times daily. Past Week Evening    insulin glargine (LANTUS PEN) 100 UNIT/ML pen Inject 23 Units subcutaneously at bedtime. Past Week Evening    ipratropium - albuterol 0.5 mg/2.5 mg/3 mL (DUONEB) 0.5-2.5 (3) MG/3ML neb solution Take 1 vial by nebulization every 6 hours as needed for shortness of breath, wheezing or cough. Past Month Evening    levothyroxine (SYNTHROID/LEVOTHROID) 125 MCG tablet Take 125 mcg by mouth every morning (before breakfast). Past Week Morning    Lidocaine (LIDOCARE) 4 % Patch Place 1 patch onto the skin every 24 hours. To prevent lidocaine toxicity, patient should be patch free for 12 hrs daily. Past Month    lisinopril (ZESTRIL)  30 MG tablet Take 30 mg by mouth daily. Past Month Morning    Melatonin 10 MG TABS tablet Take 10 mg by mouth at bedtime. Past Month Bedtime    metFORMIN (GLUCOPHAGE XR) 500 MG 24 hr tablet Take 1,000 mg by mouth 2 times daily (with meals). Past Month Evening    methocarbamol (ROBAXIN) 500 MG tablet Take 500 mg by mouth every 6 hours as needed for muscle spasms. Past Month    multivitamin w/minerals (THERA-VIT-M) tablet Take 1 tablet by mouth daily. Past Month Morning    nystatin (MYCOSTATIN) 988805 UNIT/GM external powder Apply topically 2 times daily as needed for other. Past Month    ondansetron (ZOFRAN ODT) 4 MG ODT tab Take 4 mg by mouth every 8 hours as needed for nausea. Past Month    pantoprazole (PROTONIX) 20 MG EC tablet Take 20 mg by mouth daily. Past Week Morning    QUEtiapine (SEROQUEL) 25 MG tablet Take 25 mg by mouth at bedtime. Past Week Bedtime    rosuvastatin (CRESTOR) 40 MG tablet Take 1 tablet (40 mg) by mouth daily. Taking    [START ON 5/2/2025] ticagrelor (BRILINTA) 90 MG tablet Take 1 tablet (90 mg) by mouth 2 times daily. Dose to start tomorrow morning. Taking    topiramate (TOPAMAX) 25 MG tablet Take 25 mg by mouth 2 times daily as needed. Taking As Needed    traZODone (DESYREL) 50 MG tablet Take 50 mg by mouth nightly as needed for sleep. Past Month Bedtime    valACYclovir (VALTREX) 500 MG tablet Take 1 tablet by mouth daily. Past Month Morning    venlafaxine (EFFEXOR XR) 75 MG 24 hr capsule Take 75 mg by mouth daily. Past Week Morning

## 2025-05-02 NOTE — CONSULTS
NUTRITION EDUCATION      REASON :  Provider order for heart healthy diet education.    NUTRITION HISTORY:  Patient limits salt and counts carbohydrates.  Patient has had diabetic diet education in the past.    NUTRITION DIAGNOSIS:  Food- and nutrition-related knowledge deficit R/t heart disease evidenced by need for therapeutic diet    INTERVENTIONS:    Nutrition Prescription:  Heart healthy consistent carbohydrate diet.     Implementation:      *  Nutrition Education (Content):   A)  Provided handout Heart healthy Consistent carb Nutrition Therapy.   B)  Discussed choosing low sodium, low fat foods, including up to 1/2 plate vegetables at meals.      *  Nutrition Education (Application):   A)  Discussed current eating habits and recommended alternative food choices      *  Anticipate good compliance      *  Diet Education - refer to Education Flowsheet    Goals:      *  Patient participated in diet education and plans to continue to work on eating low sodium, low fat.      *  All of the above goals met during the education session    Follow Up/Monitoring:      *  Provided RD contact information for future questions      *  Recommended Out-Patient Nutrition Referral, if further diet instructions are needed

## 2025-05-03 ENCOUNTER — APPOINTMENT (OUTPATIENT)
Dept: PHYSICAL THERAPY | Facility: HOSPITAL | Age: 60
End: 2025-05-03
Attending: EMERGENCY MEDICINE
Payer: COMMERCIAL

## 2025-05-03 ENCOUNTER — APPOINTMENT (OUTPATIENT)
Dept: OCCUPATIONAL THERAPY | Facility: HOSPITAL | Age: 60
End: 2025-05-03
Attending: STUDENT IN AN ORGANIZED HEALTH CARE EDUCATION/TRAINING PROGRAM
Payer: COMMERCIAL

## 2025-05-03 LAB
ALBUMIN SERPL BCG-MCNC: 3.7 G/DL (ref 3.5–5.2)
ALP SERPL-CCNC: 90 U/L (ref 40–150)
ALT SERPL W P-5'-P-CCNC: 54 U/L (ref 0–50)
ANION GAP SERPL CALCULATED.3IONS-SCNC: 11 MMOL/L (ref 7–15)
AST SERPL W P-5'-P-CCNC: 140 U/L (ref 0–45)
BASE EXCESS BLDV CALC-SCNC: -2.9 MMOL/L (ref -3–3)
BILIRUB SERPL-MCNC: 0.4 MG/DL
BUN SERPL-MCNC: 27.3 MG/DL (ref 8–23)
CA-I BLD-MCNC: 4.4 MG/DL (ref 4.4–5.2)
CALCIUM SERPL-MCNC: 8.1 MG/DL (ref 8.8–10.4)
CHLORIDE SERPL-SCNC: 99 MMOL/L (ref 98–107)
CORTIS SERPL-MCNC: 19.5 UG/DL
CREAT SERPL-MCNC: 1.47 MG/DL (ref 0.51–0.95)
EGFRCR SERPLBLD CKD-EPI 2021: 41 ML/MIN/1.73M2
ERYTHROCYTE [DISTWIDTH] IN BLOOD BY AUTOMATED COUNT: 16.2 % (ref 10–15)
FERRITIN SERPL-MCNC: 36 NG/ML (ref 11–328)
GLUCOSE BLDC GLUCOMTR-MCNC: 228 MG/DL (ref 70–99)
GLUCOSE BLDC GLUCOMTR-MCNC: 320 MG/DL (ref 70–99)
GLUCOSE BLDC GLUCOMTR-MCNC: 371 MG/DL (ref 70–99)
GLUCOSE BLDC GLUCOMTR-MCNC: 382 MG/DL (ref 70–99)
GLUCOSE SERPL-MCNC: 285 MG/DL (ref 70–99)
HCO3 BLDV-SCNC: 22 MMOL/L (ref 21–28)
HCO3 SERPL-SCNC: 23 MMOL/L (ref 22–29)
HCT VFR BLD AUTO: 25.4 % (ref 35–47)
HGB BLD-MCNC: 7.7 G/DL (ref 11.7–15.7)
LACTATE SERPL-SCNC: 1.7 MMOL/L (ref 0.7–2)
MAGNESIUM SERPL-MCNC: 2.3 MG/DL (ref 1.7–2.3)
MCH RBC QN AUTO: 22.8 PG (ref 26.5–33)
MCHC RBC AUTO-ENTMCNC: 30.3 G/DL (ref 31.5–36.5)
MCV RBC AUTO: 75 FL (ref 78–100)
O2/TOTAL GAS SETTING VFR VENT: 21 %
OXYHGB MFR BLDV: 39 % (ref 70–75)
PCO2 BLDV: 38 MM HG (ref 40–50)
PH BLDV: 7.37 [PH] (ref 7.32–7.43)
PLATELET # BLD AUTO: 240 10E3/UL (ref 150–450)
PO2 BLDV: 27 MM HG (ref 25–47)
POTASSIUM SERPL-SCNC: 3.5 MMOL/L (ref 3.4–5.3)
PROT SERPL-MCNC: 6.1 G/DL (ref 6.4–8.3)
RBC # BLD AUTO: 3.38 10E6/UL (ref 3.8–5.2)
SAO2 % BLDV: 40.3 % (ref 70–75)
SODIUM SERPL-SCNC: 133 MMOL/L (ref 135–145)
WBC # BLD AUTO: 11.3 10E3/UL (ref 4–11)

## 2025-05-03 PROCEDURE — 36415 COLL VENOUS BLD VENIPUNCTURE: CPT | Performed by: INTERNAL MEDICINE

## 2025-05-03 PROCEDURE — 99233 SBSQ HOSP IP/OBS HIGH 50: CPT | Performed by: INTERNAL MEDICINE

## 2025-05-03 PROCEDURE — 82247 BILIRUBIN TOTAL: CPT | Performed by: INTERNAL MEDICINE

## 2025-05-03 PROCEDURE — 82728 ASSAY OF FERRITIN: CPT | Performed by: INTERNAL MEDICINE

## 2025-05-03 PROCEDURE — 250N000009 HC RX 250: Performed by: INTERNAL MEDICINE

## 2025-05-03 PROCEDURE — 250N000013 HC RX MED GY IP 250 OP 250 PS 637: Performed by: EMERGENCY MEDICINE

## 2025-05-03 PROCEDURE — 82805 BLOOD GASES W/O2 SATURATION: CPT | Performed by: INTERNAL MEDICINE

## 2025-05-03 PROCEDURE — 250N000013 HC RX MED GY IP 250 OP 250 PS 637: Performed by: INTERNAL MEDICINE

## 2025-05-03 PROCEDURE — 94640 AIRWAY INHALATION TREATMENT: CPT

## 2025-05-03 PROCEDURE — 85027 COMPLETE CBC AUTOMATED: CPT | Performed by: INTERNAL MEDICINE

## 2025-05-03 PROCEDURE — 83605 ASSAY OF LACTIC ACID: CPT | Performed by: INTERNAL MEDICINE

## 2025-05-03 PROCEDURE — 250N000011 HC RX IP 250 OP 636: Performed by: INTERNAL MEDICINE

## 2025-05-03 PROCEDURE — 250N000011 HC RX IP 250 OP 636: Mod: JZ | Performed by: STUDENT IN AN ORGANIZED HEALTH CARE EDUCATION/TRAINING PROGRAM

## 2025-05-03 PROCEDURE — 99291 CRITICAL CARE FIRST HOUR: CPT | Performed by: INTERNAL MEDICINE

## 2025-05-03 PROCEDURE — 82330 ASSAY OF CALCIUM: CPT | Performed by: INTERNAL MEDICINE

## 2025-05-03 PROCEDURE — 83735 ASSAY OF MAGNESIUM: CPT | Performed by: INTERNAL MEDICINE

## 2025-05-03 PROCEDURE — 200N000001 HC R&B ICU

## 2025-05-03 PROCEDURE — 250N000013 HC RX MED GY IP 250 OP 250 PS 637: Performed by: STUDENT IN AN ORGANIZED HEALTH CARE EDUCATION/TRAINING PROGRAM

## 2025-05-03 PROCEDURE — 97166 OT EVAL MOD COMPLEX 45 MIN: CPT | Mod: GO

## 2025-05-03 PROCEDURE — 97161 PT EVAL LOW COMPLEX 20 MIN: CPT | Mod: GP

## 2025-05-03 PROCEDURE — 97535 SELF CARE MNGMENT TRAINING: CPT | Mod: GO

## 2025-05-03 PROCEDURE — 250N000012 HC RX MED GY IP 250 OP 636 PS 637: Performed by: INTERNAL MEDICINE

## 2025-05-03 PROCEDURE — 999N000157 HC STATISTIC RCP TIME EA 10 MIN

## 2025-05-03 RX ORDER — POTASSIUM CHLORIDE 1500 MG/1
20 TABLET, EXTENDED RELEASE ORAL ONCE
Status: COMPLETED | OUTPATIENT
Start: 2025-05-03 | End: 2025-05-03

## 2025-05-03 RX ORDER — CALCIUM GLUCONATE 20 MG/ML
1 INJECTION, SOLUTION INTRAVENOUS ONCE
Status: COMPLETED | OUTPATIENT
Start: 2025-05-03 | End: 2025-05-03

## 2025-05-03 RX ADMIN — OXYCODONE HYDROCHLORIDE 5 MG: 5 TABLET ORAL at 14:45

## 2025-05-03 RX ADMIN — ALBUTEROL SULFATE 2 PUFF: 90 AEROSOL, METERED RESPIRATORY (INHALATION) at 18:59

## 2025-05-03 RX ADMIN — LEVOTHYROXINE SODIUM 125 MCG: 0.12 TABLET ORAL at 07:52

## 2025-05-03 RX ADMIN — GABAPENTIN 300 MG: 300 CAPSULE ORAL at 20:59

## 2025-05-03 RX ADMIN — BUSPIRONE HYDROCHLORIDE 5 MG: 5 TABLET ORAL at 14:47

## 2025-05-03 RX ADMIN — VENLAFAXINE HYDROCHLORIDE 75 MG: 75 CAPSULE, EXTENDED RELEASE ORAL at 08:02

## 2025-05-03 RX ADMIN — GABAPENTIN 300 MG: 300 CAPSULE ORAL at 08:03

## 2025-05-03 RX ADMIN — DOBUTAMINE IN DEXTROSE 5 MCG/KG/MIN: 200 INJECTION, SOLUTION INTRAVENOUS at 15:22

## 2025-05-03 RX ADMIN — BUPROPION HYDROCHLORIDE 300 MG: 300 TABLET, EXTENDED RELEASE ORAL at 07:52

## 2025-05-03 RX ADMIN — INSULIN ASPART 2 UNITS: 100 INJECTION, SOLUTION INTRAVENOUS; SUBCUTANEOUS at 07:47

## 2025-05-03 RX ADMIN — POTASSIUM CHLORIDE 20 MEQ: 1500 TABLET, EXTENDED RELEASE ORAL at 07:52

## 2025-05-03 RX ADMIN — INSULIN ASPART 8 UNITS: 100 INJECTION, SOLUTION INTRAVENOUS; SUBCUTANEOUS at 17:02

## 2025-05-03 RX ADMIN — INSULIN GLARGINE 23 UNITS: 100 INJECTION, SOLUTION SUBCUTANEOUS at 20:59

## 2025-05-03 RX ADMIN — GABAPENTIN 300 MG: 300 CAPSULE ORAL at 14:47

## 2025-05-03 RX ADMIN — INSULIN ASPART 1 UNITS: 100 INJECTION, SOLUTION INTRAVENOUS; SUBCUTANEOUS at 21:03

## 2025-05-03 RX ADMIN — CALCIUM GLUCONATE 1 G: 20 INJECTION, SOLUTION INTRAVENOUS at 14:51

## 2025-05-03 RX ADMIN — TRAZODONE HYDROCHLORIDE 50 MG: 50 TABLET ORAL at 20:58

## 2025-05-03 RX ADMIN — FUROSEMIDE 40 MG: 10 INJECTION, SOLUTION INTRAMUSCULAR; INTRAVENOUS at 01:34

## 2025-05-03 RX ADMIN — PANTOPRAZOLE SODIUM 20 MG: 20 TABLET, DELAYED RELEASE ORAL at 08:03

## 2025-05-03 RX ADMIN — BUSPIRONE HYDROCHLORIDE 5 MG: 5 TABLET ORAL at 08:03

## 2025-05-03 RX ADMIN — FENTANYL CITRATE 25 MCG: 50 INJECTION, SOLUTION INTRAMUSCULAR; INTRAVENOUS at 15:20

## 2025-05-03 RX ADMIN — OXYCODONE HYDROCHLORIDE 5 MG: 5 TABLET ORAL at 12:40

## 2025-05-03 RX ADMIN — FLUTICASONE FUROATE AND VILANTEROL TRIFENATATE 1 PUFF: 200; 25 POWDER RESPIRATORY (INHALATION) at 09:30

## 2025-05-03 RX ADMIN — ROSUVASTATIN 40 MG: 40 TABLET, FILM COATED ORAL at 20:58

## 2025-05-03 RX ADMIN — TICAGRELOR 90 MG: 90 TABLET ORAL at 17:06

## 2025-05-03 RX ADMIN — NOREPINEPHRINE BITARTRATE 0.04 MCG/KG/MIN: 0.02 INJECTION, SOLUTION INTRAVENOUS at 15:21

## 2025-05-03 RX ADMIN — FORMOTEROL FUMARATE DIHYDRATE 20 MCG: 20 SOLUTION RESPIRATORY (INHALATION) at 08:40

## 2025-05-03 RX ADMIN — ALBUTEROL SULFATE 2 PUFF: 90 AEROSOL, METERED RESPIRATORY (INHALATION) at 20:57

## 2025-05-03 RX ADMIN — TICAGRELOR 90 MG: 90 TABLET ORAL at 06:08

## 2025-05-03 RX ADMIN — VALACYCLOVIR HYDROCHLORIDE 500 MG: 500 TABLET, FILM COATED ORAL at 08:03

## 2025-05-03 RX ADMIN — INSULIN ASPART 3 UNITS: 100 INJECTION, SOLUTION INTRAVENOUS; SUBCUTANEOUS at 12:10

## 2025-05-03 RX ADMIN — BUDESONIDE 0.5 MG: 0.5 INHALANT ORAL at 08:40

## 2025-05-03 RX ADMIN — FENTANYL CITRATE 25 MCG: 50 INJECTION, SOLUTION INTRAMUSCULAR; INTRAVENOUS at 21:11

## 2025-05-03 RX ADMIN — BUSPIRONE HYDROCHLORIDE 5 MG: 5 TABLET ORAL at 20:58

## 2025-05-03 ASSESSMENT — ACTIVITIES OF DAILY LIVING (ADL)
ADLS_ACUITY_SCORE: 66
ADLS_ACUITY_SCORE: 65
ADLS_ACUITY_SCORE: 66
ADLS_ACUITY_SCORE: 65
ADLS_ACUITY_SCORE: 64
PREVIOUS_RESPONSIBILITIES: MEAL PREP;SHOPPING;MEDICATION MANAGEMENT;FINANCES
ADLS_ACUITY_SCORE: 65
ADLS_ACUITY_SCORE: 64
ADLS_ACUITY_SCORE: 65
ADLS_ACUITY_SCORE: 66
ADLS_ACUITY_SCORE: 65
ADLS_ACUITY_SCORE: 66
ADLS_ACUITY_SCORE: 66
ADLS_ACUITY_SCORE: 64
ADLS_ACUITY_SCORE: 66
ADLS_ACUITY_SCORE: 64
ADLS_ACUITY_SCORE: 66
ADLS_ACUITY_SCORE: 66
ADLS_ACUITY_SCORE: 64
ADLS_ACUITY_SCORE: 65
ADLS_ACUITY_SCORE: 66
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
## 2025-05-03 NOTE — PROGRESS NOTES
05/03/25 1025   Appointment Info   Signing Clinician's Name / Credentials (OT) Mary Jane Chun OT   Living Environment   People in Home alone   Current Living Arrangements independent living facility   Home Accessibility no concerns   Transportation Anticipated health plan transportation   Living Environment Comments pt was independent w/her ADLs/transfers uses a w/c for mobility   Self-Care   Usual Activity Tolerance good   Current Activity Tolerance fair   Equipment Currently Used at Home wheelchair, manual;walker, rolling;commode chair;shower chair   Fall history within last six months no   Instrumental Activities of Daily Living (IADL)   Previous Responsibilities meal prep;shopping;medication management;finances  (family/home health assist assist w/IADls)   General Information   Onset of Illness/Injury or Date of Surgery 05/01/25   Referring Physician Dr Gao   Patient/Family Therapy Goal Statement (OT) go  home   Additional Occupational Profile Info/Pertinent History of Current Problem 59-year-old female with history of PAD status post right AKA, chronic kidney disease, anxiety, type 2 diabetes, hypertension, C. difficile and known coronary artery disease status post multiple PCI was admitted to ICU after STEMI and found to have severe occlusion of the LAD which was stented with 1 stent to the diagonal and 1 to the LAD.  [facility] consulted for postoperative management.   Existing Precautions/Restrictions cardiac   Left Upper Extremity (Weight-bearing Status) full weight-bearing (FWB)   Right Upper Extremity (Weight-bearing Status) non weight-bearing (NWB)   Left Lower Extremity (Weight-bearing Status) full weight-bearing (FWB)   Right Lower Extremity (Weight-bearing Status) full weight-bearing (FWB)   Cognitive Status Examination   Orientation Status orientation to person, place and time   Visual Perception   Visual Impairment/Limitations corrective lenses full-time   Sensory   Sensory Quick Adds sensation  intact   Pain Assessment   Patient Currently in Pain No   Posture   Posture forward head position   Range of Motion Comprehensive   General Range of Motion no range of motion deficits identified   Strength Comprehensive (MMT)   General Manual Muscle Testing (MMT) Assessment no strength deficits identified   Muscle Tone Assessment   Muscle Tone Quick Adds No deficits were identified   Coordination   Upper Extremity Coordination No deficits were identified   Bed Mobility   Bed Mobility supine-sit   Supine-Sit Bremer (Bed Mobility) contact guard   Assistive Device (Bed Mobility) bed rails  (using LUE only)   Transfers   Transfers bed-chair transfer   Transfer Skill: Bed to Chair/Chair to Bed   Bed-Chair Bremer (Transfers) minimum assist (75% patient effort)   Assistive Device (Bed-Chair Transfers) other (see comments)  (hand hold)   Balance   Balance Assessment standing dynamic balance   Activities of Daily Living   BADL Assessment/Intervention lower body dressing   Lower Body Dressing Assessment/Training   Bremer Level (Lower Body Dressing) moderate assist (50% patient effort)   Clinical Impression   Criteria for Skilled Therapeutic Interventions Met (OT) Yes, treatment indicated   OT Diagnosis Angio, recent AKA 12/17/24   Influenced by the following impairments fartigue, decreased ADLs/balance   OT Problem List-Impairments impacting ADL activity tolerance impaired   Assessment of Occupational Performance 3-5 Performance Deficits   Identified Performance Deficits fatigue, decreased ADLs/balance   Planned Therapy Interventions (OT) ADL retraining   Clinical Decision Making Complexity (OT) detailed assessment/moderate complexity   Risk & Benefits of therapy have been explained evaluation/treatment results reviewed;care plan/treatment goals reviewed;risks/benefits reviewed;participants voiced agreement with care plan   OT Total Evaluation Time   OT Eval, Moderate Complexity Minutes (89622) 10   OT Goals    Therapy Frequency (OT) 5 times/week   OT Predicted Duration/Target Date for Goal Attainment 05/09/25   OT Goals Hygiene/Grooming;Lower Body Dressing;Toilet Transfer/Toileting;Aerobic Activity   OT: Hygiene/Grooming modified independent   OT: Lower Body Dressing Modified independent   OT: Toilet Transfer/Toileting Modified independent  (following angio prrecautions)   OT: Perform aerobic activity with stable cardiovascular response 10 minutes;continuous activity  (with UE cardiac cals)   Interventions   Interventions Quick Adds Self-Care/Home Management;Therapeutic Procedures/Exercise   Self-Care/Home Management   Self-Care/Home Mgmt/ADL, Compensatory, Meal Prep Minutes (15330) 10   Symptoms Noted During/After Treatment (Meal Preparation/Planning Training) fatigue   Treatment Detail/Skilled Intervention Min A w/transfers pivot only on LLE has no prosthesis-pt uses w/c at home for mobility, G/H Min A sitting supported in chair, LB dress Min A to pull up pants   OT Discharge Planning   OT Plan transfers/toileting, pivoting only, has a RLE AKA w/no prosthesis here, G/H, LB dress, UE carediac cals   OT Discharge Recommendation (DC Rec) Transitional Care Facility   OT Rationale for DC Rec pt lives alone and has a decline in her ADLs/mobility requiring a TCU to increase skills to PLOF   OT Brief overview of current status Min A w/ADLs/mobility

## 2025-05-03 NOTE — PROGRESS NOTES
[facility]    Medicine Progress Note - Hospitalist Service    Date of Admission:  5/1/2025    Assessment & Plan   59-year-old female with history of PAD status post right AKA, chronic kidney disease, anxiety, type 2 diabetes, hypertension, C. difficile and known coronary artery disease status post multiple PCI was admitted to ICU after STEMI and found to have severe occlusion of the LAD which was stented with 1 stent to the diagonal and 1 to the LAD.  [facility] consulted for postoperative management.     STEMI  Coronary artery disease  - Status post PCI to prox LAD and Ist diagonal branch.    - Management per cardiac team.  On aspirin and Brilinta.  Also has severe RCA disease which is nonoperative due to multiple comorbidities and will need to be managed medically.    Acute cardiogenic pulmonary edema  Acute hypoxic resp failure  - Already significantly improved with 1 dose of IV Lasix.  Defer further management to cardiology.  - Off of CPAP. On RA now  - Echo- EF 25-30% with anterior, septal and apical wall akinesis, no LV thrombus    Hypotension  -BP dropped to 60s/30s on 5/2. Mild improvement with 250ml fluid bolus and 25gm of albumin.   -Dobutamine gtt and NE gtt started per cardiology  -Cortisol level- normal    Type 2 diabetes:   - Hold home glipizide and metformin and start diabetic order set, most recent A1c 7.7 1-month ago.  - Patient requests regular diet  - Blood sugars are in 300s. Will increase the sliding scale to medium, and add mealtime novolog 1:10    Hypertension:  - Holding home meds    History of C. difficile:   - Reports diarrhea, C. difficile toxin ordered, she has not yet produced a sample.    Elevated liver enzymes:   - Likely secondary to the above acute issues, will trend.  - Elevate transaminases with normal alk phos and T bili noted. Patient is s/p lap laurence (many years ago per patient)  - Trend. CT abd/pelvis- pending  - Monitor LFTs closely since statin has been  started per cards. Was on Repatha at home    REE on CKD3a  - Creatinine jumped up to 1.47 from 0.9. Patient doesn't have much muscle mass  - REE is likely due to ATN (hypotension) and IV contrast. Doesn't look overtly vol up on exam. Hold lasix. Consulted nephrology    Anemia, acute on chronic  -Hgb of 7.7 noted. No e/o bleeding. CT abd/pelvis- no hematoma  -The drop from 11.0-->9.2-->7.7 is likely dilutional.   -Trend. Transfuse if Hgb <7 or symptomatic    Hypothyroidism: Resume home meds when med rec complete  Mood disorder: Depression, anxiety.  Continue home Effexor and Seroquel  Peripheral neuropathy: Resume home meds, is on multiple medications including gabapentin, Butrans Voltaren, Robaxin and Lidoderm patch  Peripheral vascular disease status post right AKA          Diet: Snacks/Supplements Adult: Magic Cup; With Meals  Regular Diet Adult    DVT Prophylaxis: Pneumatic Compression Devices  Bear Catheter: PRESENT, indication: ICU only: hourly urine output needed for patient care  Lines: PRESENT      PICC 05/02/25 Triple Lumen Right Brachial vein medial Vasopressor,access-Site Assessment: WDL    Cardiac Monitoring: ACTIVE order. Indication: Post- PCI/Angiogram (24 hours)  Code Status: Full Code      Clinically Significant Risk Factors        # Hypokalemia: Lowest K = 3.2 mmol/L in last 2 days, will replace as needed  # Hyponatremia: Lowest Na = 133 mmol/L in last 2 days, will monitor as appropriate   # Hypocalcemia: Lowest Ca = 8.1 mg/dL in last 2 days, will monitor and replace as appropriate   # Hypomagnesemia: Lowest Mg = 1.1 mg/dL in last 2 days, will replace as needed      # Acute Kidney Injury, unspecified: based on a >150% or 0.3 mg/dL increase in last creatinine compared to past 90 day average, will monitor renal function  # Hypertension: Noted on problem list    # Acute heart failure with reduced ejection fraction: last echo with EF <40% and receiving IV diuretics          # DMII: A1C = 7.7 % (Ref  "range: <5.7 %) within past 6 months, PRESENT ON ADMISSION  # Overweight: Estimated body mass index is 25.29 kg/m  as calculated from the following:    Height as of this encounter: 1.651 m (5' 5\").    Weight as of this encounter: 68.9 kg (152 lb)., PRESENT ON ADMISSION     # Asthma: noted on problem list        Social Drivers of Health    Food Insecurity: No Food Insecurity (12/20/2024)    Received from [facility]    Food Insecurity     Do you worry your food will run out before you are able to buy more?: 1   Recent Concern: Food Insecurity - Food Insecurity Present (11/29/2024)    Received from [facility]    Food Insecurity     Do you worry your food will run out before you are able to buy more?: 2   Depression: At risk (11/29/2024)    Received from [facility]    PHQ-2     PHQ-2 TOTAL SCORE: 4   Tobacco Use: High Risk (5/2/2025)    Patient History     Smoking Tobacco Use: Every Day     Smokeless Tobacco Use: Never   Physical Activity: Inactive (3/3/2023)    Received from [facility]    Exercise Vital Sign     Days of Exercise per Week: 0 days     Minutes of Exercise per Session: 0 min   Stress: Stress Concern Present (3/3/2023)    Received from [facility]    [employer] [place] [employer] - Occupational Stress Questionnaire     Feeling of Stress : To some extent   Social Connections: Socially Integrated (12/20/2024)    Received from [facility]    Social Connections     Do you often feel lonely or isolated from those around you?: 0   Recent Concern: Social Connections - Socially Isolated (11/29/2024)    Received from [facility]    Social Connections     Do you often feel lonely or isolated from those around you?: 4          Disposition Plan     Medically Ready for Discharge: Anticipated in 2-4 Days             Maicol Gao, " [facility]  Hospitalist Service  [facility]  Securely message with [employer] (more info)  Text page via [employer] Paging/Directory   ______________________________________________________________________    Interval History   Patient remains in ICU on dobutamin and NE gtt. Denies any chest pain or shortness of breath. Feels about the same. Asking when she could go home as her birthday is coming soon.     Physical Exam   Vital Signs: Temp: 98.4  F (36.9  C) Temp src: Oral BP: 111/65 Pulse: 110   Resp: 30 SpO2: 93 % O2 Device: None (Room air)    Weight: 152 lbs 0 oz      General: Not in obvious distress.  HEENT: NC, AT   Chest: Clear to auscultation bilaterally  Heart: S1S2 normal, regular. No M/R/G  Abdomen: Soft. NT, ND. Bowel sounds- active.  Extremities: No leg swelling. Right AKA  Neuro: Alert and awake, grossly non-focal      Medical Decision Making             Data     I have personally reviewed the following data over the past 24 hrs:    11.3 (H)  \   7.7 (L)   / 240     133 (L) 99 27.3 (H) /  382 (H)   3.5 23 1.47 (H) \     ALT: 54 (H) AST: 140 (H) AP: 90 TBILI: 0.4   ALB: 3.7 TOT PROTEIN: 6.1 (L) LIPASE: N/A       Imaging results reviewed over the past 24 hrs:   Recent Results (from the past 24 hours)   CT Abdomen Pelvis w Contrast    Narrative    EXAM: CT ABDOMEN PELVIS W CONTRAST  LOCATION: [facility]  DATE: 05/02/2025    INDICATION: Generalized abdominal pain, diarrhea, vomiting, recent C diff infection.  COMPARISON: CT 09/26/2024.  TECHNIQUE: CT scan of the abdomen and pelvis was performed following injection of IV contrast. Multiplanar reformats were obtained. Dose reduction techniques were used.  CONTRAST: Isovue 370: 90 mL.    FINDINGS:   LOWER CHEST: Minimal dependent atelectasis in both lung bases. Moderate coronary artery calcification. Prominent interlobular septae raise the possibility for pulmonary edema.    HEPATOBILIARY: Moderate fatty infiltration of the  liver. The gallbladder is surgically absent. Normal variant reservoir effect seen upon the common bile duct. No indeterminate lesions or masses in the liver. Portal veins are patent.    PANCREAS: Normal.    SPLEEN: Normal.    ADRENAL GLANDS: Tiny bilateral benign adenomas with no significant changes since 09/06/2024. No followup recommended.    KIDNEYS/BLADDER: Benign cyst in the right kidney with no followup recommended. There is a Bear catheter in the bladder and this appears to be in good position. The urinary tract is otherwise unremarkable.    BOWEL: Normal to include the appendix.    LYMPH NODES: Normal.    VASCULATURE: Advanced calcification with no aneurysm. There are numerous stents seen in the pelvis and in the right groin region.    PELVIC ORGANS: Normal.    MUSCULOSKELETAL: Stable stranding of the subcutaneous fat, most marked in the pelvic region. Diffuse osteopenia. Moderate degenerative changes, most marked in the lower lumbar spine facet joints. Compression fracture of T12 with no significant edema,   this is likely chronic in nature and appears stable since chest radiograph 02/18/2025.      Impression    IMPRESSION:   1.  No obvious abnormalities seen to explain patient's pain and diarrhea clinically. No evidence for enteritis or colitis identified.    2.  Moderate fatty infiltration of the liver.    3.  Moderate coronary artery calcification.    4.  Prominent interlobular septae in both lung bases raising the possibility for pulmonary edema with no pleural effusions.    5.  The gallbladder is surgically absent with normal variant reservoir effect seen on the common bile duct.    6.  Stable tiny benign bilateral adrenal adenomas.    7.  Benign cyst in the right kidney with no followup recommended.    8.  Bear catheter in the bladder and this appears to be in good position.    9.  Diffuse osteopenia with old-appearing superior endplate compression fracture at T12.

## 2025-05-03 NOTE — PROCEDURES
"PICC Line Insertion Procedure Note  Pt. Name: Gloria Penn  MRN:        [id number]    Procedure: Insertion of a  triple Lumen  5 fr  Bard SOLO (valved) Power PICC, Lot number [id number]    Indications: Vasopressor, access    Contraindications : none    Procedure Details     Patient identified with 2 identifiers and \"Time Out\" conducted.  .     Central line insertion bundle followed: hand hygiene performed prior to procedure, site cleansed with cholraprep, hat, mask, sterile gloves, sterile gown worn, patient draped with maximum barrier head to toe drape, sterile field maintained.    The vein was assessed and found to be compressible and of adequate size.     Lidocaine 1% 2 ml administered sq to the insertion site. A 5 Fr PICC was inserted into the brachialvein of the right arm with ultrasound guidance. 1 attempt(s) required to access vein.   Catheter threaded without difficulty. Good blood return noted.    Modified Seldinger Technique used for insertion.    The 8 sharps that are included in the PICC insertion kit were accounted for and disposed of in the sharps container prior to breakdown of the sterile field.    Catheter secured with Securecath, biopatch and Tegaderm dressing applied.    Findings:    Total catheter length  40 cm, with 2 cm exposed. Mid upper arm circumference is 25 cm. Catheter was flushed with 30 cc NS. Patient  tolerated procedure well.    Tip placement verified by 3CG technology. Tip placement in the SVC/RA junction. Green P wave.    CLABSI prevention brochure left at bedside.    Patient's primary RN notified PICC is ready for use.      Comments:    This patient's catheter is secured with SecurAcath instead of a traditional suture or adhesive based securement. This is a subcutaneous anchor securement system (aka SRAVAN or SecurAcath) that holds the catheter just below the skin with nitinol feet and a friction fit  around the catheter.  It can stay with the catheter until the catheter is " "removed.    Do not open, change or remove the SecurAcath.  SecurAcath may be disinfected during a dressing change, but do not open or remove it.  SecurAcath acts like a hinge - lift, clean 360 degrees around, let dry and apply new dressing.  SecurAcath is compatible with all dressings and antiseptic agents.  Ensure the wings of the catheter and SecurAcath are completely under the boarder of the dressing.  SecurAcath is MRI safe to 3T, see IFU for details.  A Statlock is not necessary when SecurAcath is present.   Patients can go to MRI with SecurAcath device in place  When the catheter is indicated for removal, enter \"Nursing to Consult for Vascular Access Care\" order in [employer], watch the removal video on [email or web address], or call for training if you have not removed before. 24 hour SecurAcath Clinical Education and Support  [phone]         Melissa Baum RN  Vascular Access - [facility]      "

## 2025-05-03 NOTE — PROGRESS NOTES
05/03/25 1424   Appointment Info   Signing Clinician's Name / Credentials (PT) Laverne Tovar PT   Living Environment   People in Home alone   Current Living Arrangements independent living facility   Home Accessibility no concerns   Self-Care   Equipment Currently Used at Home wheelchair, manual;walker, rolling;prosthesis  (R AKA prosthesis)   Activity/Exercise/Self-Care Comment independent w/c transfers and mobility; prior to R AKA in Dec. 2024, pt was ambulating with 4WW; has HHA for bathing and IADL's, home RN for meds, home PT/OT   General Information   Onset of Illness/Injury or Date of Surgery 05/01/25   Referring Physician Dr. Gao   Patient/Family Therapy Goals Statement (PT) none stated   Pertinent History of Current Problem (include personal factors and/or comorbidities that impact the POC) STEMI s/p PCI 5/1/25; PAD s/p R AKA Dec 2024   Cognition   Affect/Mental Status (Cognition) WFL   Orientation Status (Cognition) oriented x 4   Follows Commands (Cognition) WFL   Range of Motion (ROM)   Range of Motion ROM is WFL   Bed Mobility   Bed Mobility supine-sit;sit-supine   Supine-Sit [place] (Bed Mobility) supervision   Sit-Supine [place] (Bed Mobility) supervision   Assistive Device (Bed Mobility) bed rails   Transfers   Comment, (Transfers) pt declined transfer trial this date   Gait/Stairs (Locomotion)   Comment, (Gait/Stairs) unable to ambulate; does not have prosthesis available; declined w/c mobility and w/c transfer this date   Balance   Balance Comments sitting edge of bed sba with UE support   Clinical Impression   Criteria for Skilled Therapeutic Intervention Yes, treatment indicated   PT Diagnosis (PT) impaired functional mobility   Influenced by the following impairments decreased strength, balance   Functional limitations due to impairments transfers, bed mob., gait   Clinical Presentation (PT Evaluation Complexity) stable   Clinical Presentation Rationale pt presents as medically  diagnosed   Clinical Decision Making (Complexity) low complexity   Planned Therapy Interventions (PT) bed mobility training;balance training;gait training;home exercise program;neuromuscular re-education;patient/family education;strengthening;transfer training;stretching;wheelchair management/propulsion training   Risk & Benefits of therapy have been explained evaluation/treatment results reviewed;patient   PT Total Evaluation Time   PT [place], Low Complexity Minutes (17907) 16   Physical Therapy Goals   PT Frequency 5x/week   PT Predicted Duration/Target Date for Goal Attainment 05/10/25   PT Goals Bed Mobility;PT Goal 1;PT Goal 2   PT: Bed Mobility Independent;Supine to/from sit   PT: Goal 1 Assess w/c transfers   PT: Goal 2 Assess w/c mobility   PT Discharge Planning   PT Plan assess w/c transfers (pt has own in room) and w/c mobility; encouraged pt to have R AKA prosthesis brought in for standing practice   PT Discharge Recommendation (DC Rec) Transitional Care Facility   PT Rationale for DC Rec continue with PT for strengthening and mobility training   PT Brief overview of current status sba bed mob with use of rail and HOB elevated   PT Total Distance Amb During Session (feet) 0   PT Equipment Needed at Discharge wheelchair;walker, rolling  (pt has w/c here; needs FWW for home use as only has 4WW)   Physical Therapy Time and Intention   Total Session Time (sum of timed and untimed services) 16

## 2025-05-04 LAB
ADV 40+41 DNA STL QL NAA+NON-PROBE: NEGATIVE
ANION GAP SERPL CALCULATED.3IONS-SCNC: 13 MMOL/L (ref 7–15)
ASTRO TYP 1-8 RNA STL QL NAA+NON-PROBE: NEGATIVE
BUN SERPL-MCNC: 26.6 MG/DL (ref 8–23)
C CAYETANENSIS DNA STL QL NAA+NON-PROBE: NEGATIVE
C DIFF GDH STL QL IA: POSITIVE
C DIFF TOX A+B STL QL IA: NEGATIVE
C DIFF TOX B STL QL: POSITIVE
CALCIUM SERPL-MCNC: 8.4 MG/DL (ref 8.8–10.4)
CAMPYLOBACTER DNA SPEC NAA+PROBE: NEGATIVE
CHLORIDE SERPL-SCNC: 100 MMOL/L (ref 98–107)
CREAT SERPL-MCNC: 1.1 MG/DL (ref 0.51–0.95)
CRYPTOSP DNA STL QL NAA+NON-PROBE: NEGATIVE
E COLI O157 DNA STL QL NAA+NON-PROBE: NORMAL
E HISTOLYT DNA STL QL NAA+NON-PROBE: NEGATIVE
EAEC ASTA GENE ISLT QL NAA+PROBE: NEGATIVE
EC STX1+STX2 GENES STL QL NAA+NON-PROBE: NEGATIVE
EGFRCR SERPLBLD CKD-EPI 2021: 58 ML/MIN/1.73M2
EPEC EAE GENE STL QL NAA+NON-PROBE: NEGATIVE
ERYTHROCYTE [DISTWIDTH] IN BLOOD BY AUTOMATED COUNT: 16.5 % (ref 10–15)
ETEC LTA+ST1A+ST1B TOX ST NAA+NON-PROBE: NEGATIVE
G LAMBLIA DNA STL QL NAA+NON-PROBE: NEGATIVE
GLUCOSE BLDC GLUCOMTR-MCNC: 106 MG/DL (ref 70–99)
GLUCOSE BLDC GLUCOMTR-MCNC: 220 MG/DL (ref 70–99)
GLUCOSE BLDC GLUCOMTR-MCNC: 229 MG/DL (ref 70–99)
GLUCOSE BLDC GLUCOMTR-MCNC: 281 MG/DL (ref 70–99)
GLUCOSE SERPL-MCNC: 218 MG/DL (ref 70–99)
HCO3 SERPL-SCNC: 21 MMOL/L (ref 22–29)
HCT VFR BLD AUTO: 23.3 % (ref 35–47)
HGB BLD-MCNC: 7.2 G/DL (ref 11.7–15.7)
LABORATORY COMMENT REPORT: ABNORMAL
MAGNESIUM SERPL-MCNC: 1.9 MG/DL (ref 1.7–2.3)
MCH RBC QN AUTO: 23.2 PG (ref 26.5–33)
MCHC RBC AUTO-ENTMCNC: 30.9 G/DL (ref 31.5–36.5)
MCV RBC AUTO: 75 FL (ref 78–100)
NOROVIRUS GI+II RNA STL QL NAA+NON-PROBE: NEGATIVE
P SHIGELLOIDES DNA STL QL NAA+NON-PROBE: NEGATIVE
PLATELET # BLD AUTO: 216 10E3/UL (ref 150–450)
POTASSIUM SERPL-SCNC: 4.2 MMOL/L (ref 3.4–5.3)
RBC # BLD AUTO: 3.1 10E6/UL (ref 3.8–5.2)
RVA RNA STL QL NAA+NON-PROBE: NEGATIVE
SALMONELLA SP RPOD STL QL NAA+PROBE: NEGATIVE
SAPO I+II+IV+V RNA STL QL NAA+NON-PROBE: NEGATIVE
SHIGELLA SP+EIEC IPAH ST NAA+NON-PROBE: NEGATIVE
SODIUM SERPL-SCNC: 134 MMOL/L (ref 135–145)
V CHOLERAE DNA SPEC QL NAA+PROBE: NEGATIVE
VIBRIO DNA SPEC NAA+PROBE: NEGATIVE
WBC # BLD AUTO: 11.5 10E3/UL (ref 4–11)
Y ENTEROCOL DNA STL QL NAA+PROBE: NEGATIVE

## 2025-05-04 PROCEDURE — 99233 SBSQ HOSP IP/OBS HIGH 50: CPT | Performed by: INTERNAL MEDICINE

## 2025-05-04 PROCEDURE — 87507 IADNA-DNA/RNA PROBE TQ 12-25: CPT

## 2025-05-04 PROCEDURE — 250N000011 HC RX IP 250 OP 636: Performed by: INTERNAL MEDICINE

## 2025-05-04 PROCEDURE — 250N000013 HC RX MED GY IP 250 OP 250 PS 637: Performed by: STUDENT IN AN ORGANIZED HEALTH CARE EDUCATION/TRAINING PROGRAM

## 2025-05-04 PROCEDURE — 94640 AIRWAY INHALATION TREATMENT: CPT | Mod: 76

## 2025-05-04 PROCEDURE — 94640 AIRWAY INHALATION TREATMENT: CPT

## 2025-05-04 PROCEDURE — 999N000157 HC STATISTIC RCP TIME EA 10 MIN

## 2025-05-04 PROCEDURE — 250N000009 HC RX 250: Performed by: INTERNAL MEDICINE

## 2025-05-04 PROCEDURE — 85041 AUTOMATED RBC COUNT: CPT | Performed by: INTERNAL MEDICINE

## 2025-05-04 PROCEDURE — 82565 ASSAY OF CREATININE: CPT | Performed by: INTERNAL MEDICINE

## 2025-05-04 PROCEDURE — 250N000013 HC RX MED GY IP 250 OP 250 PS 637: Performed by: INTERNAL MEDICINE

## 2025-05-04 PROCEDURE — 87324 CLOSTRIDIUM AG IA: CPT | Performed by: INTERNAL MEDICINE

## 2025-05-04 PROCEDURE — 87493 C DIFF AMPLIFIED PROBE: CPT | Performed by: INTERNAL MEDICINE

## 2025-05-04 PROCEDURE — 83735 ASSAY OF MAGNESIUM: CPT | Performed by: INTERNAL MEDICINE

## 2025-05-04 PROCEDURE — 200N000001 HC R&B ICU

## 2025-05-04 PROCEDURE — 250N000013 HC RX MED GY IP 250 OP 250 PS 637: Performed by: EMERGENCY MEDICINE

## 2025-05-04 PROCEDURE — 99291 CRITICAL CARE FIRST HOUR: CPT | Performed by: INTERNAL MEDICINE

## 2025-05-04 RX ORDER — GUAIFENESIN/DEXTROMETHORPHAN 100-10MG/5
10 SYRUP ORAL EVERY 4 HOURS PRN
Status: DISCONTINUED | OUTPATIENT
Start: 2025-05-04 | End: 2025-05-12 | Stop reason: HOSPADM

## 2025-05-04 RX ORDER — LIDOCAINE 4 G/G
1 PATCH TOPICAL
Status: DISCONTINUED | OUTPATIENT
Start: 2025-05-05 | End: 2025-05-12 | Stop reason: HOSPADM

## 2025-05-04 RX ORDER — FUROSEMIDE 10 MG/ML
10 INJECTION INTRAMUSCULAR; INTRAVENOUS EVERY 24 HOURS
Status: DISCONTINUED | OUTPATIENT
Start: 2025-05-04 | End: 2025-05-06

## 2025-05-04 RX ADMIN — ROSUVASTATIN 40 MG: 40 TABLET, FILM COATED ORAL at 21:02

## 2025-05-04 RX ADMIN — INSULIN GLARGINE 23 UNITS: 100 INJECTION, SOLUTION SUBCUTANEOUS at 21:02

## 2025-05-04 RX ADMIN — ALBUTEROL SULFATE 2 PUFF: 90 AEROSOL, METERED RESPIRATORY (INHALATION) at 12:52

## 2025-05-04 RX ADMIN — BUPROPION HYDROCHLORIDE 300 MG: 300 TABLET, EXTENDED RELEASE ORAL at 08:50

## 2025-05-04 RX ADMIN — GABAPENTIN 300 MG: 300 CAPSULE ORAL at 21:01

## 2025-05-04 RX ADMIN — GUAIFENESIN AND DEXTROMETHORPHAN 10 ML: 100; 10 SYRUP ORAL at 10:18

## 2025-05-04 RX ADMIN — FUROSEMIDE 10 MG: 10 INJECTION, SOLUTION INTRAMUSCULAR; INTRAVENOUS at 10:10

## 2025-05-04 RX ADMIN — ALBUTEROL SULFATE 2 PUFF: 90 AEROSOL, METERED RESPIRATORY (INHALATION) at 09:04

## 2025-05-04 RX ADMIN — BUSPIRONE HYDROCHLORIDE 5 MG: 5 TABLET ORAL at 08:49

## 2025-05-04 RX ADMIN — GABAPENTIN 300 MG: 300 CAPSULE ORAL at 08:50

## 2025-05-04 RX ADMIN — VALACYCLOVIR HYDROCHLORIDE 500 MG: 500 TABLET, FILM COATED ORAL at 08:50

## 2025-05-04 RX ADMIN — INSULIN ASPART 4 UNITS: 100 INJECTION, SOLUTION INTRAVENOUS; SUBCUTANEOUS at 09:54

## 2025-05-04 RX ADMIN — TICAGRELOR 90 MG: 90 TABLET ORAL at 06:37

## 2025-05-04 RX ADMIN — TRAZODONE HYDROCHLORIDE 50 MG: 50 TABLET ORAL at 21:02

## 2025-05-04 RX ADMIN — BUDESONIDE 0.5 MG: 0.5 INHALANT ORAL at 20:31

## 2025-05-04 RX ADMIN — ALBUTEROL SULFATE 2 PUFF: 90 AEROSOL, METERED RESPIRATORY (INHALATION) at 23:33

## 2025-05-04 RX ADMIN — PANTOPRAZOLE SODIUM 40 MG: 40 INJECTION, POWDER, FOR SOLUTION INTRAVENOUS at 08:49

## 2025-05-04 RX ADMIN — FORMOTEROL FUMARATE DIHYDRATE 20 MCG: 20 SOLUTION RESPIRATORY (INHALATION) at 17:16

## 2025-05-04 RX ADMIN — BUSPIRONE HYDROCHLORIDE 5 MG: 5 TABLET ORAL at 21:02

## 2025-05-04 RX ADMIN — VENLAFAXINE HYDROCHLORIDE 75 MG: 75 CAPSULE, EXTENDED RELEASE ORAL at 08:50

## 2025-05-04 RX ADMIN — LIDOCAINE 1 PATCH: 4 PATCH TOPICAL at 10:10

## 2025-05-04 RX ADMIN — DOBUTAMINE IN DEXTROSE 2.5 MCG/KG/MIN: 200 INJECTION, SOLUTION INTRAVENOUS at 21:49

## 2025-05-04 RX ADMIN — INSULIN ASPART 8 UNITS: 100 INJECTION, SOLUTION INTRAVENOUS; SUBCUTANEOUS at 16:09

## 2025-05-04 RX ADMIN — BUSPIRONE HYDROCHLORIDE 5 MG: 5 TABLET ORAL at 13:38

## 2025-05-04 RX ADMIN — GABAPENTIN 300 MG: 300 CAPSULE ORAL at 13:42

## 2025-05-04 RX ADMIN — ALBUTEROL SULFATE 2 PUFF: 90 AEROSOL, METERED RESPIRATORY (INHALATION) at 13:35

## 2025-05-04 RX ADMIN — LEVOTHYROXINE SODIUM 125 MCG: 0.12 TABLET ORAL at 08:50

## 2025-05-04 RX ADMIN — TICAGRELOR 90 MG: 90 TABLET ORAL at 17:07

## 2025-05-04 RX ADMIN — FLUTICASONE FUROATE AND VILANTEROL TRIFENATATE 1 PUFF: 200; 25 POWDER RESPIRATORY (INHALATION) at 09:02

## 2025-05-04 ASSESSMENT — ACTIVITIES OF DAILY LIVING (ADL)
ADLS_ACUITY_SCORE: 66

## 2025-05-04 NOTE — PROGRESS NOTES
"  '    [facility]NAL ([facility]) progress note  CC: F/U REE  S: Since last visit, patient denies any sob. Notes some arm pain. NO swelling. Creatinine down.     A/P:   Active Problems:    ACS (acute coronary syndrome) (H)    Nausea vomiting and diarrhea    Intermittent chest pain    ST elevation myocardial infarction involving left anterior descending (LAD) coronary artery (H)    History of Clostridioides difficile infection    Percutaneous transluminal coronary angioplasty status    REE: Creatinine of 1.47 up form 0.99 on 5/2. Patient with recent angio and 2 stent placement(5/1)also with significant hypotension on 5/2. In the setting of CHF and ongoing diuresis.   Improving.   Daily labs  Intake and output     2. CAD: w/ STEMI s/p angio and stent placement. Per cardiology. Lasix x 1 today per cardiology.   3. HFpEF: setting of acute STEMI, diuresis per cardiology. OK to resume diuretics.   4. Type 2 DM: per primary.   5. HTN: no changes.        Chris Remy,   [facility]  [phone] (off)       No interval changes to past medical history, social history or family history to report.    /58   Pulse 104   Temp 99.3  F (37.4  C) (Oral)   Resp 30   Ht 1.651 m (5' 5\")   Wt 69.6 kg (153 lb 8 oz)   LMP 11/23/2012   SpO2 96%   BMI 25.54 kg/m      I/O last 3 completed shifts:  In: 1517.66 [P.O.:720; I.V.:797.66]  Out: 1500 [Urine:1500]    Physical Exam:   GENERAL: calm and comfortable, alert  EYES: No scleral icterus, conjunctiva clear  ENT: Hearing normal, Oral mucosa moist  RESP: anterior clear.   CV: RRR, no murmurs. Race on left leg edema.    GI: Active BS, Soft, NT/ND,   Musculoskeletal: Normal muscle bulk/ tone;   SKIN: No rash,  PSYCH:  Appropriate mood and affect    Recent Labs   Lab 05/04/25  0415 05/03/25  0413 05/02/25  1220 05/02/25  0420 05/01/25  1615   * 133*  --  139 137   POTASSIUM 4.2 3.5 3.9 3.2* 3.8   CHLORIDE 100 99  --  103 99   CO2 21* 23  --  23 23   BUN 26.6* 27.3* "  --  17.2 18.1   CR 1.10* 1.47*  --  0.99* 0.95   GFRESTIMATED 58* 41*  --  65 69   LESLY 8.4* 8.1*  --  8.3* 9.8   MAG 1.9 2.3 2.5* 1.1* 1.2*   ALBUMIN  --  3.7  --  3.7 4.2       Recent Labs   Lab 05/04/25  0842 05/03/25  0413 05/02/25  0420 05/01/25  1615   WBC 11.5* 11.3* 11.4* 16.6*   HGB 7.2* 7.7* 9.2* 11.0*   HCT 23.3* 25.4* 30.6* 36.8   MCV 75* 75* 75* 75*    240 250 349             Current Facility-Administered Medications:     albuterol (PROVENTIL HFA/VENTOLIN HFA) inhaler, 2 puff, Inhalation, Q4H PRN, Tej Weller MD, 2 puff at 05/04/25 0904    [Held by provider] budesonide (PULMICORT) neb solution 0.5 mg, 0.5 mg, Nebulization, 2 times daily, Thu Mac MD, 0.5 mg at 05/03/25 0840    buprenorphine (BUTRANS) 5 MCG/HR WK patch 1 patch, 1 patch, Transdermal, Weekly **AND** buprenorphine (BUTRANS) Patch in Place, , Transdermal, Q8H TANA, Tej Weller MD    buPROPion (WELLBUTRIN XL) 24 hr tablet 300 mg, 300 mg, Oral, QAM, Tej Weller MD, 300 mg at 05/04/25 0850    busPIRone (BUSPAR) tablet 5 mg, 5 mg, Oral, TID, Tej Weller MD, 5 mg at 05/04/25 0849    Continuing beta blocker from home medication list OR beta blocker order already placed during this visit, , Does not apply, DOES NOT GO TO Gela LYNCH Kyle, MD    glucose gel 15-30 g, 15-30 g, Oral, Q15 Min PRN **OR** dextrose 50 % injection 25-50 mL, 25-50 mL, Intravenous, Q15 Min PRN **OR** glucagon injection 1 mg, 1 mg, Subcutaneous, Q15 Min PRN, Tej eWller MD    diclofenac (VOLTAREN) 1 % topical gel 2 g, 2 g, Topical, 4x Daily PRN, Tej Weller MD    DOBUTamine (DOBUTREX) 500 mg in D5W 250 mL infusion (adult std conc), 5 mcg/kg/min, Intravenous, Continuous, Gayatri Hendricks MD, Last Rate: 5.3 mL/hr at 05/04/25 0850, 2.5 mcg/kg/min at 05/04/25 0850    fluticasone-vilanterol (BREO ELLIPTA) 200-25 MCG/ACT inhaler 1 puff, 1 puff, Inhalation, Daily, Tej Weller MD, 1  puff at 05/04/25 0902    [Held by provider] formoterol (PERFOROMIST) neb solution 20 mcg, 20 mcg, Nebulization, 2 times daily, Thu Mac MD, 20 mcg at 05/03/25 0840    furosemide (LASIX) injection 10 mg, 10 mg, Intravenous, Q24H, Marcus Siddiqui DO, 10 mg at 05/04/25 1010    [Held by provider] furosemide (LASIX) injection 40 mg, 40 mg, Intravenous, Q12H, Gayatri Hendricks MD, 40 mg at 05/03/25 0134    gabapentin (NEURONTIN) capsule 300 mg, 300 mg, Oral, TID, Tej Weller MD, 300 mg at 05/04/25 0850    guaiFENesin-dextromethorphan (ROBITUSSIN DM) 100-10 MG/5ML syrup 10 mL, 10 mL, Oral, Q4H PRN, Maicol Gao MBBS, 10 mL at 05/04/25 1018    hydrALAZINE (APRESOLINE) injection 10 mg, 10 mg, Intravenous, Q4H PRN, Isiah Ren MD    insulin aspart (NovoLOG) injection (RAPID ACTING), 1-7 Units, Subcutaneous, TID AC, Maicol Gao MBBS, 2 Units at 05/04/25 0955    insulin aspart (NovoLOG) injection (RAPID ACTING), 1-5 Units, Subcutaneous, At Bedtime, Maicol Gao MBBS, 1 Units at 05/03/25 2103    insulin aspart (NovoLOG) injection (RAPID ACTING), , Subcutaneous, TID w/meals, Maicol Gao MBBS, 4 Units at 05/04/25 0954    insulin glargine (LANTUS PEN) injection 23 Units, 23 Units, Subcutaneous, At Bedtime, Tej Weller MD, 23 Units at 05/03/25 2059    ipratropium - albuterol 0.5 mg/2.5 mg/3 mL (DUONEB) neb solution 3 mL, 1 vial, Nebulization, Q6H PRN, Tej Weller MD, 3 mL at 05/02/25 1510    levothyroxine (SYNTHROID/LEVOTHROID) tablet 125 mcg, 125 mcg, Oral, QAM AC, Tej Weller MD, 125 mcg at 05/04/25 0850    [START ON 5/5/2025] Lidocaine (LIDOCARE) 4 % Patch 1 patch, 1 patch, Transdermal, Q24H, Maicol Gao MBBS, 1 patch at 05/04/25 1010    Lidocaine (LIDOCARE) 4 % Patch 1 patch, 1 patch, Transdermal, Q24H, Tej Weller MD    lidocaine (LMX4) cream, , Topical, Q1H PRN, Marcus Siddiqui, DO    lidocaine 1 % 0.1-5  mL, 0.1-5 mL, Other, Q1H PRN, Marcus Siddiqui DO, 2 mL at 05/02/25 2135    [Held by provider] metFORMIN (GLUCOPHAGE XR) 24 hr tablet 1,000 mg, 1,000 mg, Oral, BID w/meals, Tej Weller MD    methocarbamol (ROBAXIN) tablet 500 mg, 500 mg, Oral, Q6H PRN, Tej Weller MD    metoprolol (LOPRESSOR) injection 5 mg, 5 mg, Intravenous, Q15 Min PRN, Isiah Ren MD    [Held by provider] metoprolol succinate ER (TOPROL XL) 24 hr tablet 25 mg, 25 mg, Oral, Daily, Maicol Gao MBBS    midazolam (VERSED) injection 0.5 mg, 0.5 mg, Intravenous, Q5 Min PRN, Isiah Ren MD    naloxone (NARCAN) injection 0.2 mg, 0.2 mg, Intravenous, Q2 Min PRN **OR** naloxone (NARCAN) injection 0.4 mg, 0.4 mg, Intravenous, Q2 Min PRN **OR** naloxone (NARCAN) injection 0.2 mg, 0.2 mg, Intramuscular, Q2 Min PRN **OR** naloxone (NARCAN) injection 0.4 mg, 0.4 mg, Intramuscular, Q2 Min PRN, Isiah Ren MD    nitroGLYcerin (NITROSTAT) sublingual tablet 0.4 mg, 0.4 mg, Sublingual, Q5 Min PRN, Isiah Ren MD    norepinephrine (LEVOPHED) 4 mg in  mL infusion PREMIX, 0.01-0.6 mcg/kg/min, Intravenous, Continuous, Marcus Siddiqui DO, Paused at 05/04/25 1026    ondansetron (ZOFRAN ODT) ODT tab 4 mg, 4 mg, Oral, Q6H PRN **OR** ondansetron (ZOFRAN) injection 4 mg, 4 mg, Intravenous, Q6H PRN, Isiah Ren MD    oxyCODONE (ROXICODONE) tablet 5 mg, 5 mg, Oral, Q4H PRN, 5 mg at 05/03/25 1445 **OR** oxyCODONE (ROXICODONE) tablet 10 mg, 10 mg, Oral, Q4H PRN, Isiah Ren MD    pantoprazole (PROTONIX) IV push injection 40 mg, 40 mg, Intravenous, Daily with breakfast, Marcus Siddiqui DO, 40 mg at 05/04/25 0849    Percutaneous Coronary Intervention orders placed (this is information for BPA alerting), , Does not apply, DOES NOT GO TO MAR, Isiah Ren MD    rosuvastatin (CRESTOR) tablet 40 mg, 40 mg, Oral, QPM, Isiah Ren MD, 40 mg at 05/03/25 2058    sodium chloride (PF) 0.9% PF flush 10-20 mL,  10-20 mL, Intracatheter, q1 min prn, Sima Maicol B, MBBS    sodium chloride (PF) 0.9% PF flush 10-40 mL, 10-40 mL, Intracatheter, Once PRN, Marcus Siddiqui,     sodium chloride (PF) 0.9% PF flush 10-40 mL, 10-40 mL, Intracatheter, Q7 Days, Bairagi, Maicol B, MBBS, 10 mL at 05/02/25 2155    sodium chloride (PF) 0.9% PF flush 10-40 mL, 10-40 mL, Intracatheter, Daily, Bairagi, Maicol B, MBBS, 20 mL at 05/04/25 0903    ticagrelor (BRILINTA) tablet 90 mg, 90 mg, Oral, Q12H, Isiah Ren MD, 90 mg at 05/04/25 0637    topiramate (TOPAMAX) tablet 25 mg, 25 mg, Oral, BID PRN, Tej Weller MD    traZODone (DESYREL) tablet 50 mg, 50 mg, Oral, At Bedtime PRN, Tej Weller MD, 50 mg at 05/03/25 2058    valACYclovir (VALTREX) tablet 500 mg, 500 mg, Oral, Daily, Tej Weller MD, 500 mg at 05/04/25 0850    venlafaxine (EFFEXOR XR) 24 hr capsule 75 mg, 75 mg, Oral, Daily, Tej Weller MD, 75 mg at 05/04/25 0850      Labs personally reviewed today during this evaluation at 10:29 AM

## 2025-05-04 NOTE — PROGRESS NOTES
[facility]    Medicine Progress Note - Hospitalist Service    Date of Admission:  5/1/2025    Assessment & Plan   59-year-old female with history of PAD status post right AKA, chronic kidney disease, anxiety, type 2 diabetes, hypertension, C. difficile and known coronary artery disease status post multiple PCI was admitted to ICU after STEMI and found to have severe occlusion of the LAD which was stented with 1 stent to the diagonal and 1 to the LAD.  [facility] consulted for postoperative management.     STEMI  Coronary artery disease  - Status post PCI to prox LAD and Ist diagonal branch.    - Management per cardiac team.  On aspirin and Brilinta.  Also has severe RCA disease which is nonoperative due to multiple comorbidities and will need to be managed medically.    Acute cardiogenic pulmonary edema  Acute hypoxic resp failure  - Off of CPAP. On RA now  - Echo- EF 25-30% with anterior, septal and apical wall akinesis, no LV thrombus  - Not being able to diurese due to hypotension. Cardiology started low dose IV lasix 10mg daily. Monitor response    Hypotension  -BP dropped to 60s/30s on 5/2. Mild improvement with 250ml fluid bolus and 25gm of albumin.   -Dobutamine gtt and NE gtt started per cardiology. Currently off of NE gtt; only on small dose of dobutamine gtt  -Cortisol level- normal    Type 2 diabetes:   - Hold home glipizide and metformin and start diabetic order set, most recent A1c 7.7 1-month ago.  - Patient requests regular diet  - Blood sugars are in 300s. Increased the sliding scale to medium, and add mealtime novolog 1:10    Hypertension:  - Holding home meds    History of C. difficile:   - Reports diarrhea, C. difficile toxin ordered, she has not yet produced a sample.    Elevated liver enzymes:   - Likely secondary to the above acute issues, will trend.  - Elevate transaminases with normal alk phos and T bili noted. Patient is s/p lap laurence (many years ago per patient)  - Trend.  "CT abd/pelvis- pending  - Monitor LFTs closely since statin has been started per cards. Was on Repatha at home    REE on CKD3a  - Creatinine jumped up to 1.47 from 0.9. Patient doesn't have much muscle mass  - REE is likely due to ATN (hypotension) and IV contrast.   - Appreciate nephrology following  -Creatinine looks better today    Anemia, acute on chronic  -Hgb of 7.7 noted. No e/o bleeding. CT abd/pelvis- no hematoma  -The drop from 11.0-->9.2-->7.7-->7.2 is likely dilutional.   -Trend. Transfuse if Hgb <7 or symptomatic    Hypothyroidism: Resume home meds when med rec complete  Mood disorder: Depression, anxiety.  Continue home Effexor and Seroquel  Peripheral neuropathy: Resume home meds, is on multiple medications including gabapentin, Butrans Voltaren, Robaxin and Lidoderm patch  Peripheral vascular disease status post right AKA          Diet: Snacks/Supplements Adult: Magic Cup; With Meals  Regular Diet Adult    DVT Prophylaxis: Pneumatic Compression Devices  Bear Catheter: PRESENT, indication: ICU only: hourly urine output needed for patient care  Lines: PRESENT      PICC 05/02/25 Triple Lumen Right Brachial vein medial Vasopressor,access-Site Assessment: WDL    Cardiac Monitoring: ACTIVE order. Indication: Post- PCI/Angiogram (24 hours)  Code Status: Full Code      Clinically Significant Risk Factors         # Hyponatremia: Lowest Na = 133 mmol/L in last 2 days, will monitor as appropriate   # Hypocalcemia: Lowest Ca = 8.1 mg/dL in last 2 days, will monitor and replace as appropriate         # Hypertension: Noted on problem list    # Acute heart failure with reduced ejection fraction: last echo with EF <40% and receiving IV diuretics          # DMII: A1C = 7.7 % (Ref range: <5.7 %) within past 6 months, PRESENT ON ADMISSION  # Overweight: Estimated body mass index is 25.54 kg/m  as calculated from the following:    Height as of this encounter: 1.651 m (5' 5\").    Weight as of this encounter: 69.6 kg " (153 lb 8 oz)., PRESENT ON ADMISSION     # Asthma: noted on problem list        Social Drivers of Health    Food Insecurity: No Food Insecurity (12/20/2024)    [facility]    Food Insecurity     Do you worry your food will run out before you are able to buy more?: 1   Recent Concern: Food Insecurity - Food Insecurity Present (11/29/2024)    [facility]    Food Insecurity     Do you worry your food will run out before you are able to buy more?: 2   Depression: At risk (11/29/2024)    Received from [facility] beds at the [facility]    PHQ-2     PHQ-2 TOTAL SCORE: 4   Tobacco Use: High Risk (5/2/2025)    Patient History     Smoking Tobacco Use: Every Day     Smokeless Tobacco Use: Never   Physical Activity: Inactive (3/3/2023)    Received from [facility]    Exercise Vital Sign     Days of Exercise per Week: 0 days     Minutes of Exercise per Session: 0 min   Stress: Stress Concern Present (3/3/2023)    Received from [facility]    Citizen of [place] [employer] - Occupational Stress Questionnaire     Feeling of Stress : To some extent   Social Connections: Socially Integrated (12/20/2024)    [facility]    Social Connections     Do you often feel lonely or isolated from those around you?: 0   Recent Concern: Social Connections - Socially Isolated (11/29/2024)    [facility]    Social Connections     Do you often feel lonely or isolated from those around you?: 4          Disposition Plan     Medically Ready for Discharge: Anticipated in 2-4 Days             EILEEN Thomas  Hospitalist Service  [facility]  Securely message with [employer] (more info)  Text page via [facility] Paging/Directory   ______________________________________________________________________    Interval History    Patient remains in ICU on dobutamine gtt.     Reports pain at the right FA. There is some bruise likely related to the catheterization for cardiac cath. Denies any tingling or numbness. Able to flex and extend fingers, but with some pain.     Physical Exam   Vital Signs: Temp: 98.1  F (36.7  C) Temp src: Oral BP: 125/68 Pulse: 100     SpO2: 98 % O2 Device: None (Room air)    Weight: 153 lbs 8 oz      General: Not in obvious distress.  HEENT: NC, AT   Chest: Clear to auscultation bilaterally  Heart: S1S2 normal, regular. No M/R/G  Abdomen: Soft. NT, ND. Bowel sounds- active.  Extremities: No leg swelling. Right AKA. Bruise over the anterior aspect of right FA.  Neuro: Alert and awake, grossly non-focal      Medical Decision Making             Data     I have personally reviewed the following data over the past 24 hrs:    11.5 (H)  \   7.2 (L)   / 216     134 (L) 100 26.6 (H) /  281 (H)   4.2 21 (L) 1.10 (H) \     Procal: N/A CRP: N/A Lactic Acid: 1.7       Ferritin:  N/A % Retic:  N/A LDH:  N/A       Imaging results reviewed over the past 24 hrs:   No results found for this or any previous visit (from the past 24 hours).

## 2025-05-04 NOTE — PLAN OF CARE
[facility] - ICU    RN Progress Note:            Pertinent Assessments:      Please refer to flowsheet rows for full assessment     Inspiratory wheezing auscultated prior to administration of PRN albuterol inhaler, alleviated after administration.            Key Events - This Shift:       Patient continues to complain of pain at the right radial access site d/t hematoma. Offered ice or heat, patient declined.      RN Managed Protocols Ordered:  Yes  Protocols:Potassium and Magnesium  PRN'S:  Protocols Status: Reviewed with Oncoming RN         Problem: Adult Inpatient Plan of Care  Goal: Absence of Hospital-Acquired Illness or Injury  Intervention: Identify and Manage Fall Risk  Recent Flowsheet Documentation  Taken 5/4/2025 0000 by Marcus, Anais, RN  Safety Promotion/Fall Prevention:   assistive device/personal items within reach   room door open   room near nurse's station   safety round/check completed  Taken 5/3/2025 2000 by Marcus, Anais, RN  Safety Promotion/Fall Prevention:   assistive device/personal items within reach   room door open   room near nurse's station   safety round/check completed  Intervention: Prevent Skin Injury  Recent Flowsheet Documentation  Taken 5/4/2025 0000 by Anais Velazquez RN  Body Position:   position changed independently   turned   left  Taken 5/3/2025 2200 by Anais Velazquez RN  Body Position: position changed independently  Taken 5/3/2025 2000 by Anais Velazquez RN  Body Position:   position changed independently   side-lying 30 degrees  Intervention: Prevent Infection  Recent Flowsheet Documentation  Taken 5/4/2025 0000 by Anais Velazquez RN  Infection Prevention:   personal protective equipment utilized   single patient room provided  Taken 5/3/2025 2000 by Anais Velazquez RN  Infection Prevention:   personal protective equipment utilized   single patient room provided  Goal: Optimal Comfort and  Wellbeing  Intervention: Monitor Pain and Promote Comfort  Recent Flowsheet Documentation  Taken 5/3/2025 2111 by Anais Velazquez RN  Pain Management Interventions: medication (see MAR)  Intervention: Provide Person-Centered Care  Recent Flowsheet Documentation  Taken 5/4/2025 0000 by Anais Velazquez RN  Trust Relationship/Rapport:   care explained   choices provided   thoughts/feelings acknowledged  Taken 5/3/2025 2000 by Anais Velazquez RN  Trust Relationship/Rapport:   care explained   choices provided   thoughts/feelings acknowledged     Problem: Cardiac Catheterization (Diagnostic/Interventional)  Goal: Stable Heart Rate and Rhythm  Intervention: Monitor and Manage Cardiac Rhythm Effect  Recent Flowsheet Documentation  Taken 5/3/2025 2000 by Anais Velazquez RN  Fluid/Electrolyte Management: fluids provided  Goal: Anesthesia/Sedation Recovery  Intervention: Optimize Anesthesia Recovery  Recent Flowsheet Documentation  Taken 5/4/2025 0000 by Marcus, Anais, RN  Safety Promotion/Fall Prevention:   assistive device/personal items within reach   room door open   room near nurse's station   safety round/check completed  Taken 5/3/2025 2000 by Marcus, Anais, RN  Safety Promotion/Fall Prevention:   assistive device/personal items within reach   room door open   room near nurse's station   safety round/check completed  Goal: Optimal Pain Control and Function  Intervention: Prevent or Manage Pain  Recent Flowsheet Documentation  Taken 5/3/2025 2111 by Anais Velazquez RN  Pain Management Interventions: medication (see MAR)  Goal: Absence of Vascular Access Complication  Intervention: Prevent and Manage Access Complications  Recent Flowsheet Documentation  Taken 5/4/2025 0000 by Anais Velazquez RN  Bleeding Precautions:   blood pressure closely monitored   monitored for signs of bleeding  Activity Management: activity adjusted per tolerance  Head of Bed (HOB) Positioning: HOB at 30  degrees  Taken 5/3/2025 2200 by Anais Velazquez RN  Head of Bed (HOB) Positioning: HOB at 30-45 degrees  Taken 5/3/2025 2000 by Anais Velazquez RN  Bleeding Precautions:   blood pressure closely monitored   monitored for signs of bleeding  Activity Management: activity adjusted per tolerance  Head of Bed (HOB) Positioning: HOB at 30 degrees     Problem: Comorbidity Management  Goal: Maintenance of Asthma Control  Intervention: Maintain Asthma Symptom Control  Recent Flowsheet Documentation  Taken 5/4/2025 0000 by Anais Velazquez RN  Medication Review/Management:   medications reviewed   high-risk medications identified  Taken 5/3/2025 2000 by Anais Velazquez RN  Medication Review/Management:   medications reviewed   high-risk medications identified  Goal: Blood Glucose Levels Within Targeted Range  Intervention: Monitor and Manage Glycemia  Recent Flowsheet Documentation  Taken 5/4/2025 0000 by Anais Velazquez RN  Medication Review/Management:   medications reviewed   high-risk medications identified  Taken 5/3/2025 2000 by Anais Velazquez RN  Medication Review/Management:   medications reviewed   high-risk medications identified  Goal: Blood Pressure in Desired Range  Intervention: Maintain Blood Pressure Management  Recent Flowsheet Documentation  Taken 5/4/2025 0000 by Anais Velazquez RN  Medication Review/Management:   medications reviewed   high-risk medications identified  Taken 5/3/2025 2000 by Anais Velazquez RN  Medication Review/Management:   medications reviewed   high-risk medications identified
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
## 2025-05-05 ENCOUNTER — APPOINTMENT (OUTPATIENT)
Dept: OCCUPATIONAL THERAPY | Facility: HOSPITAL | Age: 60
End: 2025-05-05
Payer: COMMERCIAL

## 2025-05-05 ENCOUNTER — APPOINTMENT (OUTPATIENT)
Dept: PHYSICAL THERAPY | Facility: HOSPITAL | Age: 60
End: 2025-05-05
Payer: COMMERCIAL

## 2025-05-05 LAB
ANION GAP SERPL CALCULATED.3IONS-SCNC: 7 MMOL/L (ref 7–15)
BUN SERPL-MCNC: 19.6 MG/DL (ref 8–23)
CALCIUM SERPL-MCNC: 8.1 MG/DL (ref 8.8–10.4)
CHLORIDE SERPL-SCNC: 103 MMOL/L (ref 98–107)
CREAT SERPL-MCNC: 0.92 MG/DL (ref 0.51–0.95)
EGFRCR SERPLBLD CKD-EPI 2021: 71 ML/MIN/1.73M2
ERYTHROCYTE [DISTWIDTH] IN BLOOD BY AUTOMATED COUNT: 16.4 % (ref 10–15)
GLUCOSE BLDC GLUCOMTR-MCNC: 108 MG/DL (ref 70–99)
GLUCOSE BLDC GLUCOMTR-MCNC: 207 MG/DL (ref 70–99)
GLUCOSE BLDC GLUCOMTR-MCNC: 214 MG/DL (ref 70–99)
GLUCOSE BLDC GLUCOMTR-MCNC: 340 MG/DL (ref 70–99)
GLUCOSE SERPL-MCNC: 244 MG/DL (ref 70–99)
HCO3 SERPL-SCNC: 25 MMOL/L (ref 22–29)
HCT VFR BLD AUTO: 24.3 % (ref 35–47)
HGB BLD-MCNC: 7.3 G/DL (ref 11.7–15.7)
MAGNESIUM SERPL-MCNC: 1.8 MG/DL (ref 1.7–2.3)
MCH RBC QN AUTO: 22.7 PG (ref 26.5–33)
MCHC RBC AUTO-ENTMCNC: 30 G/DL (ref 31.5–36.5)
MCV RBC AUTO: 76 FL (ref 78–100)
PLATELET # BLD AUTO: 253 10E3/UL (ref 150–450)
POTASSIUM SERPL-SCNC: 4.4 MMOL/L (ref 3.4–5.3)
RBC # BLD AUTO: 3.21 10E6/UL (ref 3.8–5.2)
SODIUM SERPL-SCNC: 135 MMOL/L (ref 135–145)
WBC # BLD AUTO: 11.2 10E3/UL (ref 4–11)

## 2025-05-05 PROCEDURE — 97110 THERAPEUTIC EXERCISES: CPT | Mod: GO

## 2025-05-05 PROCEDURE — 82565 ASSAY OF CREATININE: CPT | Performed by: INTERNAL MEDICINE

## 2025-05-05 PROCEDURE — 120N000004 HC R&B MS OVERFLOW

## 2025-05-05 PROCEDURE — 250N000013 HC RX MED GY IP 250 OP 250 PS 637: Performed by: EMERGENCY MEDICINE

## 2025-05-05 PROCEDURE — 272N000202 HC AEROBIKA WITH MANOMETER

## 2025-05-05 PROCEDURE — 999N000157 HC STATISTIC RCP TIME EA 10 MIN

## 2025-05-05 PROCEDURE — 250N000011 HC RX IP 250 OP 636: Performed by: INTERNAL MEDICINE

## 2025-05-05 PROCEDURE — 97530 THERAPEUTIC ACTIVITIES: CPT | Mod: GO

## 2025-05-05 PROCEDURE — 250N000013 HC RX MED GY IP 250 OP 250 PS 637: Performed by: STUDENT IN AN ORGANIZED HEALTH CARE EDUCATION/TRAINING PROGRAM

## 2025-05-05 PROCEDURE — 94640 AIRWAY INHALATION TREATMENT: CPT

## 2025-05-05 PROCEDURE — 97530 THERAPEUTIC ACTIVITIES: CPT | Mod: GP

## 2025-05-05 PROCEDURE — 97542 WHEELCHAIR MNGMENT TRAINING: CPT | Mod: GP

## 2025-05-05 PROCEDURE — 250N000009 HC RX 250: Performed by: INTERNAL MEDICINE

## 2025-05-05 PROCEDURE — 250N000013 HC RX MED GY IP 250 OP 250 PS 637: Performed by: INTERNAL MEDICINE

## 2025-05-05 PROCEDURE — 99222 1ST HOSP IP/OBS MODERATE 55: CPT | Performed by: STUDENT IN AN ORGANIZED HEALTH CARE EDUCATION/TRAINING PROGRAM

## 2025-05-05 PROCEDURE — 97535 SELF CARE MNGMENT TRAINING: CPT | Mod: GO

## 2025-05-05 PROCEDURE — 94799 UNLISTED PULMONARY SVC/PX: CPT

## 2025-05-05 PROCEDURE — 99233 SBSQ HOSP IP/OBS HIGH 50: CPT | Performed by: INTERNAL MEDICINE

## 2025-05-05 PROCEDURE — 99232 SBSQ HOSP IP/OBS MODERATE 35: CPT | Performed by: INTERNAL MEDICINE

## 2025-05-05 PROCEDURE — 85027 COMPLETE CBC AUTOMATED: CPT | Performed by: INTERNAL MEDICINE

## 2025-05-05 PROCEDURE — 83735 ASSAY OF MAGNESIUM: CPT | Performed by: INTERNAL MEDICINE

## 2025-05-05 PROCEDURE — 94640 AIRWAY INHALATION TREATMENT: CPT | Mod: 76

## 2025-05-05 RX ORDER — QUETIAPINE FUMARATE 25 MG/1
25 TABLET, FILM COATED ORAL AT BEDTIME
Status: DISCONTINUED | OUTPATIENT
Start: 2025-05-05 | End: 2025-05-12 | Stop reason: HOSPADM

## 2025-05-05 RX ADMIN — VENLAFAXINE HYDROCHLORIDE 75 MG: 75 CAPSULE, EXTENDED RELEASE ORAL at 08:32

## 2025-05-05 RX ADMIN — ALBUTEROL SULFATE 2 PUFF: 90 AEROSOL, METERED RESPIRATORY (INHALATION) at 20:05

## 2025-05-05 RX ADMIN — INSULIN ASPART 15 UNITS: 100 INJECTION, SOLUTION INTRAVENOUS; SUBCUTANEOUS at 17:04

## 2025-05-05 RX ADMIN — BUSPIRONE HYDROCHLORIDE 5 MG: 5 TABLET ORAL at 21:30

## 2025-05-05 RX ADMIN — GUAIFENESIN AND DEXTROMETHORPHAN 10 ML: 100; 10 SYRUP ORAL at 18:13

## 2025-05-05 RX ADMIN — PANTOPRAZOLE SODIUM 40 MG: 40 INJECTION, POWDER, FOR SOLUTION INTRAVENOUS at 08:31

## 2025-05-05 RX ADMIN — INSULIN ASPART: 100 INJECTION, SOLUTION INTRAVENOUS; SUBCUTANEOUS at 13:09

## 2025-05-05 RX ADMIN — METOPROLOL SUCCINATE 12.5 MG: 25 TABLET, EXTENDED RELEASE ORAL at 10:43

## 2025-05-05 RX ADMIN — GABAPENTIN 300 MG: 300 CAPSULE ORAL at 21:30

## 2025-05-05 RX ADMIN — LEVOTHYROXINE SODIUM 125 MCG: 0.12 TABLET ORAL at 08:32

## 2025-05-05 RX ADMIN — FORMOTEROL FUMARATE DIHYDRATE 20 MCG: 20 SOLUTION RESPIRATORY (INHALATION) at 07:48

## 2025-05-05 RX ADMIN — ALBUTEROL SULFATE 2 PUFF: 90 AEROSOL, METERED RESPIRATORY (INHALATION) at 18:12

## 2025-05-05 RX ADMIN — TICAGRELOR 90 MG: 90 TABLET ORAL at 17:04

## 2025-05-05 RX ADMIN — GABAPENTIN 300 MG: 300 CAPSULE ORAL at 14:30

## 2025-05-05 RX ADMIN — BUSPIRONE HYDROCHLORIDE 5 MG: 5 TABLET ORAL at 08:31

## 2025-05-05 RX ADMIN — Medication 10 MG: at 21:51

## 2025-05-05 RX ADMIN — TICAGRELOR 90 MG: 90 TABLET ORAL at 06:58

## 2025-05-05 RX ADMIN — BUDESONIDE 0.5 MG: 0.5 INHALANT ORAL at 07:48

## 2025-05-05 RX ADMIN — GUAIFENESIN AND DEXTROMETHORPHAN 10 ML: 100; 10 SYRUP ORAL at 21:30

## 2025-05-05 RX ADMIN — INSULIN ASPART 8 UNITS: 100 INJECTION, SOLUTION INTRAVENOUS; SUBCUTANEOUS at 08:49

## 2025-05-05 RX ADMIN — FUROSEMIDE 10 MG: 10 INJECTION, SOLUTION INTRAMUSCULAR; INTRAVENOUS at 09:38

## 2025-05-05 RX ADMIN — BUDESONIDE 0.5 MG: 0.5 INHALANT ORAL at 20:14

## 2025-05-05 RX ADMIN — QUETIAPINE FUMARATE 25 MG: 25 TABLET ORAL at 21:51

## 2025-05-05 RX ADMIN — BUSPIRONE HYDROCHLORIDE 5 MG: 5 TABLET ORAL at 14:30

## 2025-05-05 RX ADMIN — TRAZODONE HYDROCHLORIDE 50 MG: 50 TABLET ORAL at 21:30

## 2025-05-05 RX ADMIN — FLUTICASONE FUROATE AND VILANTEROL TRIFENATATE 1 PUFF: 200; 25 POWDER RESPIRATORY (INHALATION) at 08:34

## 2025-05-05 RX ADMIN — VALACYCLOVIR HYDROCHLORIDE 500 MG: 500 TABLET, FILM COATED ORAL at 08:32

## 2025-05-05 RX ADMIN — GABAPENTIN 300 MG: 300 CAPSULE ORAL at 08:32

## 2025-05-05 RX ADMIN — FORMOTEROL FUMARATE DIHYDRATE 20 MCG: 20 SOLUTION RESPIRATORY (INHALATION) at 20:14

## 2025-05-05 RX ADMIN — BUPROPION HYDROCHLORIDE 300 MG: 300 TABLET, EXTENDED RELEASE ORAL at 08:33

## 2025-05-05 ASSESSMENT — ACTIVITIES OF DAILY LIVING (ADL)
ADLS_ACUITY_SCORE: 66
ADLS_ACUITY_SCORE: 63
ADLS_ACUITY_SCORE: 66
ADLS_ACUITY_SCORE: 66
ADLS_ACUITY_SCORE: 63
ADLS_ACUITY_SCORE: 66
ADLS_ACUITY_SCORE: 63
ADLS_ACUITY_SCORE: 70
ADLS_ACUITY_SCORE: 63
ADLS_ACUITY_SCORE: 67
ADLS_ACUITY_SCORE: 63
ADLS_ACUITY_SCORE: 66
ADLS_ACUITY_SCORE: 63
ADLS_ACUITY_SCORE: 66
ADLS_ACUITY_SCORE: 63
ADLS_ACUITY_SCORE: 66
ADLS_ACUITY_SCORE: 66
ADLS_ACUITY_SCORE: 63

## 2025-05-05 NOTE — PROGRESS NOTES
"Care Management Follow Up    Length of Stay (days): 4    Expected Discharge Date: 05/05/2025     Concerns to be Addressed:  medical progression, discharge planning     Patient plan of care discussed at interdisciplinary rounds: Yes    Anticipated Discharge Disposition:                Anticipated Discharge Services:    Anticipated Discharge DME:      Patient/family educated on Medicare website which has current facility and service quality ratings:    Education Provided on the Discharge Plan:    Patient/Family in Agreement with the Plan:      Referrals Placed by CM/SW:    Private pay costs discussed: Not applicable    Discussed  Partnership in Safe Discharge Planning  document with patient/family: No     Handoff Completed: No, handoff not indicated or clinically appropriate    Additional Information:  Social Hx: Pt lives alone at [address] [address] building, with elevator access. Pt Ind with most ADLS, but does get STBA help a couple times a week for showers from a HHA through [employer]. Pt also had a home RN for med mgmt, and PT/OT through [facility]. Pt dependent on some other IADLS such as housekeeping, shopping, meal prep, and transport. Pt uses a 4WW in the home, has a manual w/c for longer distance, a commode, and a shower chair.  Pt is on a CADI Waiver and she stated, \"they are working on getting her meals on wheels, and pt already has metro mobility. \" Pt has also used family for transport and [facility] KIM Hyatt #[phone], has given pt rides to appointments before as well.   CADI Waiver CM is Audra #[phone].     Patient goal: Pt anticipates returning home with continued home care.  Pt may need Mobilitie w/c transport at discharge time.     Therapy recommends TCU.  Per PT: SBA bed mobility, Min A x1-2 pivot transfers, SBA to Min A wheelchair mobility in hallway   Per OT: pt lives alone and has a decline in her ADLs/mobility requiring a TCU to increase skills to PLOF     KIM met with " patient in patient's room.  Patient is interested in going to TCU, she'd like to go to [facility], she was recently there.  Patient reports se called Almita in admissions and Almita said she can comes back, she just needs referral from Hospital.  [facility]  CM also left TCU list for patient to review for additional choices in the even [facility] is not able to accept.    Next Steps: Follow up with [facility] re TCU referral.    CM will continue to monitor medical progression, follow treatment team recommendations, and aid in discharge planning.        Debi Raymond RN

## 2025-05-05 NOTE — PROGRESS NOTES
"Imp/plan:  ST MI s/p PCI to LAD - due to tob use, on ticagrelor, rosuvastastin, hold asp due to low hgb (7.3 today), myalgias on statin, currently on repatha (noted LDL 20 on adm), would cont repatha at home, stop rosuvastatin.  Consider GI consult given need for ticagrelor due to recent PCI (noted hx of anemia and prior EGD/colon per pt). If bleeding continues, (presumed source given low iron), can change ticagelor to clopidogrel preferably after one month  HFrEF - EF 25-30% - on dobutamine/norepi, furosemide q24, Cr 0.92, K 4.4, stop dobutaine/pressure, start metoprolol succinate low dose, and evenutally start entresto  If stable can downgrade to telemetry    Follow up [facility] or with our team Dr. Ren or Dr. Hendricks  Review of Systems: 12 points negative other than above    /62   Pulse 101   Temp 98.4  F (36.9  C) (Oral)   Resp 18   Ht 1.651 m (5' 5\")   Wt 69.6 kg (153 lb 8 oz)   LMP 11/23/2012   SpO2 94%   BMI 25.54 kg/m    JVP<7cm, carotids normal  Lungs clear  Cor RRR no c,r,m  Abs soft +BS, no mass  Ext no c,c,e    Lab Results   Component Value Date    HGB 7.3 (L) 05/05/2025     Lab Results   Component Value Date     05/05/2025     No results found for: \"CREATININE\"  No components found for: \"K\"          Satya Marie MD  Interventional Cardiology   [facility]  [phone]    "
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
## 2025-05-05 NOTE — PLAN OF CARE
Problem: Adult Inpatient Plan of Care  Goal: Absence of Hospital-Acquired Illness or Injury  Intervention: Identify and Manage Fall Risk  Recent Flowsheet Documentation  Taken 5/5/2025 0000 by Marcus, Anais, RN  Safety Promotion/Fall Prevention:   assistive device/personal items within reach   room door open   room near nurse's station   safety round/check completed  Taken 5/4/2025 2000 by Marcus, Anais, RN  Safety Promotion/Fall Prevention:   assistive device/personal items within reach   room door open   room near nurse's station   safety round/check completed  Intervention: Prevent Skin Injury  Recent Flowsheet Documentation  Taken 5/5/2025 0200 by Anais Velazquez RN  Body Position: (boosted up in bed) position changed independently  Taken 5/5/2025 0000 by Anais Velazquez RN  Body Position: position changed independently  Taken 5/4/2025 2200 by Anais Velazquez RN  Body Position: position changed independently  Taken 5/4/2025 2000 by Anais Velazquez RN  Body Position:   position changed independently   sitting up in bed  Intervention: Prevent Infection  Recent Flowsheet Documentation  Taken 5/5/2025 0000 by Anais Velazquez RN  Infection Prevention:   personal protective equipment utilized   single patient room provided  Taken 5/4/2025 2000 by Anais Velazquez RN  Infection Prevention:   personal protective equipment utilized   single patient room provided  Goal: Optimal Comfort and Wellbeing  Intervention: Provide Person-Centered Care  Recent Flowsheet Documentation  Taken 5/5/2025 0000 by Anais Velazquez RN  Trust Relationship/Rapport:   care explained   choices provided   thoughts/feelings acknowledged  Taken 5/4/2025 2000 by Anais Velazquez RN  Trust Relationship/Rapport:   care explained   choices provided   thoughts/feelings acknowledged     Problem: Cardiac Catheterization (Diagnostic/Interventional)  Goal: Anesthesia/Sedation Recovery  Intervention:  Optimize Anesthesia Recovery  Recent Flowsheet Documentation  Taken 5/5/2025 0000 by Marcus, Anais, RN  Safety Promotion/Fall Prevention:   assistive device/personal items within reach   room door open   room near nurse's station   safety round/check completed  Taken 5/4/2025 2000 by Marcus, Anais, RN  Safety Promotion/Fall Prevention:   assistive device/personal items within reach   room door open   room near nurse's station   safety round/check completed  Goal: Absence of Vascular Access Complication  Intervention: Prevent and Manage Access Complications  Recent Flowsheet Documentation  Taken 5/5/2025 0200 by Anais Velazquez RN  Head of Bed (HOB) Positioning: HOB at 20-30 degrees  Taken 5/5/2025 0000 by Anais Velazquez RN  Bleeding Precautions:   blood pressure closely monitored   monitored for signs of bleeding  Activity Management:   activity adjusted per tolerance   sitting, edge of bed  Head of Bed (HOB) Positioning: HOB at 30 degrees  Taken 5/4/2025 2200 by Anais Velazquez RN  Head of Bed (HOB) Positioning: HOB at 30-45 degrees  Taken 5/4/2025 2000 by Anais Velazquez RN  Bleeding Precautions:   blood pressure closely monitored   monitored for signs of bleeding  Activity Management:   activity adjusted per tolerance   sitting, edge of bed  Head of Bed (HOB) Positioning: HOB at 30-45 degrees     Problem: Comorbidity Management  Goal: Maintenance of Asthma Control  Intervention: Maintain Asthma Symptom Control  Recent Flowsheet Documentation  Taken 5/5/2025 0000 by Anais Velazquez RN  Medication Review/Management:   medications reviewed   high-risk medications identified  Taken 5/4/2025 2000 by Anais Velazquez RN  Medication Review/Management:   medications reviewed   high-risk medications identified  Goal: Blood Glucose Levels Within Targeted Range  Intervention: Monitor and Manage Glycemia  Recent Flowsheet Documentation  Taken 5/5/2025 0000 by Anais Velazquez  RN  Medication Review/Management:   medications reviewed   high-risk medications identified  Taken 5/4/2025 2000 by Anais Velazquez, RN  Medication Review/Management:   medications reviewed   high-risk medications identified  Goal: Blood Pressure in Desired Range  Intervention: Maintain Blood Pressure Management  Recent Flowsheet Documentation  Taken 5/5/2025 0000 by Anais Velazquez RN  Medication Review/Management:   medications reviewed   high-risk medications identified  Taken 5/4/2025 2000 by Anais Velazquez, RN  Medication Review/Management:   medications reviewed   high-risk medications identified

## 2025-05-05 NOTE — PLAN OF CARE
Pt denies pain, nausea, shortness of breath, oxygenation adequate on room air. Good appetite. Bear out, not yet voiding, last bladder scan at 1800 was 300mL.  Off all drips. Pleasant and cooperative with care team. Family visited and updated, brought patient's prosthetic leg.

## 2025-05-05 NOTE — PROGRESS NOTES
Nephrology  REE resolving nicely.  Creatinine now 0.92.  Not much more to add  I will sign off now  Please call with questions    Jerzy Hanna MD  [facility]  [phone]

## 2025-05-05 NOTE — PROGRESS NOTES
[facility]    Medicine Progress Note - Hospitalist Service    Date of Admission:  5/1/2025    Assessment & Plan   59-year-old female with history of PAD status post right AKA, chronic kidney disease, anxiety, type 2 diabetes, hypertension, C. difficile and known coronary artery disease status post multiple PCI was admitted to ICU after STEMI and found to have severe occlusion of the LAD which was stented with 1 stent to the diagonal and 1 to the LAD.  [facility] [place].     STEMI  Coronary artery disease  - Status post PCI to prox LAD and Ist diagonal branch.    - Management per cardiac team.  On aspirin and Brilinta.  Also has severe RCA disease which is nonoperative due to multiple comorbidities and will need to be managed medically.    Acute cardiogenic pulmonary edema  Acute hypoxic resp failure  - Off of CPAP. On RA now  - Echo- EF 25-30% with anterior, septal and apical wall akinesis, no LV thrombus  - Not being able to diurese due to hypotension. Cardiology started low dose IV lasix 10mg daily. Monitor response    Hypotension  -BP dropped to 60s/30s on 5/2. Mild improvement with 250ml fluid bolus and 25gm of albumin.   -Dobutamine gtt and NE gtt started per cardiology. NE gtt topped 5/4; Dobutamine gtt stopped as of 5/5  -Cortisol level- normal    Type 2 diabetes:   - Hold home glipizide and metformin and start diabetic order set, most recent A1c 7.7 1-month ago.  - Patient requests regular diet  - Blood sugars are in 300s. Increased the sliding scale to medium, and add mealtime novolog 1:10    Hypertension:  - Holding home meds    History of C. difficile:   - Reports diarrhea PTA.   - Cdiff toxin B by PCR+, Cdiff GDH antigen+, Cdiff toxin-. ID consulted. No need for treatment per ID. Avoid unnecessary antibiotics per ID.     Elevated liver enzymes:   - Likely secondary to the above acute issues, will trend.  - Elevate transaminases with normal alk phos and T  bili noted. Patient is s/p lap laurence (many years ago per patient)  - Trend. CT abd/pelvis- moderate fatty infiltration of the liver  - Monitor LFTs closely since statin has been started per cards. Was on Repatha at home    REE on CKD3a  - Creatinine jumped up to 1.47 from 0.9. Patient doesn't have much muscle mass  - REE is likely due to ATN (hypotension) and IV contrast.   - Appreciate nephrology following  - Creatinine looks better today    Anemia, acute on chronic  -Hgb of 7.7 noted. No e/o bleeding. CT abd/pelvis- no hematoma  -The drop from 11.0-->9.2-->7.7-->7.2 is likely dilutional.   -Trend. Transfuse if Hgb <7 or symptomatic    Right FA bruise/hematoma  -Due to arterial puncture during the coronary angiogram  -Symptomatic treatment  Hypothyroidism: Resume home meds when med rec complete  Mood disorder: Depression, anxiety.  Continue home Effexor and Seroquel  Peripheral neuropathy: Resume home meds, is on multiple medications including gabapentin, Butrans Voltaren, Robaxin and Lidoderm patch  Peripheral vascular disease status post right AKA          Diet: Snacks/Supplements Adult: Magic Cup; With Meals  Regular Diet Adult    DVT Prophylaxis: Pneumatic Compression Devices  Bear Catheter: Not present  Lines: PRESENT      PICC 05/02/25 Triple Lumen Right Brachial vein medial Vasopressor,access-Site Assessment: WDL    Cardiac Monitoring: ACTIVE order. Indication: Post- PCI/Angiogram (24 hours)  Code Status: Full Code      Clinically Significant Risk Factors         # Hyponatremia: Lowest Na = 134 mmol/L in last 2 days, will monitor as appropriate           # Hypertension: Noted on problem list    # Acute heart failure with reduced ejection fraction: last echo with EF <40% and receiving IV diuretics          # DMII: A1C = 7.7 % (Ref range: <5.7 %) within past 6 months, PRESENT ON ADMISSION  # Overweight: Estimated body mass index is 25.54 kg/m  as calculated from the following:    Height as of this encounter:  "1.651 m (5' 5\").    Weight as of this encounter: 69.6 kg (153 lb 8 oz)., PRESENT ON ADMISSION     # Asthma: noted on problem list        Social Drivers of Health    Depression: At risk (11/29/2024)    Received from [employer] [facility]    PHQ-2     PHQ-2 TOTAL SCORE: 4   Housing Stability: High Risk (5/5/2025)    Housing Stability     Do you have housing? : Yes     Are you worried about losing your housing?: Yes   Tobacco Use: High Risk (5/2/2025)    Patient History     Smoking Tobacco Use: Every Day     Smokeless Tobacco Use: Never   Physical Activity: Inactive (3/3/2023)    Received from [facility]    Exercise Vital Sign     Days of Exercise per Week: 0 days     Minutes of Exercise per Session: 0 min   Stress: Stress Concern Present (3/3/2023)    Received from [facility]    [place] [employer] - Occupational Stress Questionnaire     Feeling of Stress : To some extent   Social Connections: Socially Integrated (12/20/2024)    [facility]    Social Connections     Do you often feel lonely or isolated from those around you?: 0   Recent Concern: Social Connections - Socially Isolated (11/29/2024)    Received from [employer] [facility]    Social Connections     Do you often feel lonely or isolated from those around you?: 4          Disposition Plan     Medically Ready for Discharge: Anticipated in 2-4 Days             EILEEN Thomas  Hospitalist Service  [facility]  Securely message with [employer] (more info)  Text page via [employer] Paging/Directory   ______________________________________________________________________    Interval History   Patient reports doing better today.     No diarrhea. Denies chest pain or shortness of breath at rest.    Right FA pain is somewhat better today.     Physical Exam   Vital Signs: Temp: 98.4  F (36.9  C) Temp src: Oral BP: 92/64 Pulse: 100   " Resp: 18 SpO2: 92 % O2 Device: None (Room air)    Weight: 153 lbs 8 oz      General: Not in obvious distress.  HEENT: NC, AT   Chest: Clear to auscultation bilaterally  Heart: S1S2 normal, regular. No M/R/G  Abdomen: Soft. NT, ND. Bowel sounds- active.  Extremities: No leg swelling. Right AKA. Bruise over the anterior aspect of right FA.  Neuro: Alert and awake, grossly non-focal      Medical Decision Making             Data     I have personally reviewed the following data over the past 24 hrs:    11.2 (H)  \   7.3 (L)   / 253     135 103 19.6 /  214 (H)   4.4 25 0.92 \       Imaging results reviewed over the past 24 hrs:   No results found for this or any previous visit (from the past 24 hours).

## 2025-05-06 ENCOUNTER — APPOINTMENT (OUTPATIENT)
Dept: OCCUPATIONAL THERAPY | Facility: HOSPITAL | Age: 60
End: 2025-05-06
Payer: COMMERCIAL

## 2025-05-06 ENCOUNTER — APPOINTMENT (OUTPATIENT)
Dept: PHYSICAL THERAPY | Facility: HOSPITAL | Age: 60
End: 2025-05-06
Payer: COMMERCIAL

## 2025-05-06 LAB
ANION GAP SERPL CALCULATED.3IONS-SCNC: 9 MMOL/L (ref 7–15)
BUN SERPL-MCNC: 24.6 MG/DL (ref 8–23)
CALCIUM SERPL-MCNC: 8.2 MG/DL (ref 8.8–10.4)
CHLORIDE SERPL-SCNC: 99 MMOL/L (ref 98–107)
CREAT SERPL-MCNC: 1.32 MG/DL (ref 0.51–0.95)
EGFRCR SERPLBLD CKD-EPI 2021: 46 ML/MIN/1.73M2
ERYTHROCYTE [DISTWIDTH] IN BLOOD BY AUTOMATED COUNT: 16.4 % (ref 10–15)
GLUCOSE BLDC GLUCOMTR-MCNC: 166 MG/DL (ref 70–99)
GLUCOSE BLDC GLUCOMTR-MCNC: 223 MG/DL (ref 70–99)
GLUCOSE BLDC GLUCOMTR-MCNC: 230 MG/DL (ref 70–99)
GLUCOSE BLDC GLUCOMTR-MCNC: 238 MG/DL (ref 70–99)
GLUCOSE SERPL-MCNC: 180 MG/DL (ref 70–99)
HCO3 SERPL-SCNC: 23 MMOL/L (ref 22–29)
HCT VFR BLD AUTO: 24.8 % (ref 35–47)
HGB BLD-MCNC: 7.3 G/DL (ref 11.7–15.7)
MAGNESIUM SERPL-MCNC: 1.8 MG/DL (ref 1.7–2.3)
MCH RBC QN AUTO: 22.4 PG (ref 26.5–33)
MCHC RBC AUTO-ENTMCNC: 29.4 G/DL (ref 31.5–36.5)
MCV RBC AUTO: 76 FL (ref 78–100)
PLATELET # BLD AUTO: 300 10E3/UL (ref 150–450)
POTASSIUM SERPL-SCNC: 4.7 MMOL/L (ref 3.4–5.3)
RBC # BLD AUTO: 3.26 10E6/UL (ref 3.8–5.2)
SODIUM SERPL-SCNC: 131 MMOL/L (ref 135–145)
WBC # BLD AUTO: 12.5 10E3/UL (ref 4–11)

## 2025-05-06 PROCEDURE — 99232 SBSQ HOSP IP/OBS MODERATE 35: CPT | Performed by: INTERNAL MEDICINE

## 2025-05-06 PROCEDURE — 250N000009 HC RX 250: Performed by: INTERNAL MEDICINE

## 2025-05-06 PROCEDURE — 94640 AIRWAY INHALATION TREATMENT: CPT

## 2025-05-06 PROCEDURE — 250N000013 HC RX MED GY IP 250 OP 250 PS 637: Performed by: EMERGENCY MEDICINE

## 2025-05-06 PROCEDURE — 83735 ASSAY OF MAGNESIUM: CPT | Performed by: INTERNAL MEDICINE

## 2025-05-06 PROCEDURE — 94799 UNLISTED PULMONARY SVC/PX: CPT

## 2025-05-06 PROCEDURE — 85014 HEMATOCRIT: CPT | Performed by: INTERNAL MEDICINE

## 2025-05-06 PROCEDURE — 250N000013 HC RX MED GY IP 250 OP 250 PS 637: Performed by: INTERNAL MEDICINE

## 2025-05-06 PROCEDURE — 120N000004 HC R&B MS OVERFLOW

## 2025-05-06 PROCEDURE — 82565 ASSAY OF CREATININE: CPT | Performed by: INTERNAL MEDICINE

## 2025-05-06 PROCEDURE — 97530 THERAPEUTIC ACTIVITIES: CPT | Mod: GP

## 2025-05-06 PROCEDURE — 94640 AIRWAY INHALATION TREATMENT: CPT | Mod: 76

## 2025-05-06 PROCEDURE — 999N000157 HC STATISTIC RCP TIME EA 10 MIN

## 2025-05-06 PROCEDURE — 97535 SELF CARE MNGMENT TRAINING: CPT | Mod: GO

## 2025-05-06 PROCEDURE — 999N000156 HC STATISTIC RCP CONSULT EA 30 MIN

## 2025-05-06 PROCEDURE — 97530 THERAPEUTIC ACTIVITIES: CPT | Mod: GO

## 2025-05-06 PROCEDURE — 250N000011 HC RX IP 250 OP 636: Performed by: INTERNAL MEDICINE

## 2025-05-06 PROCEDURE — 250N000013 HC RX MED GY IP 250 OP 250 PS 637: Performed by: STUDENT IN AN ORGANIZED HEALTH CARE EDUCATION/TRAINING PROGRAM

## 2025-05-06 RX ORDER — SPIRONOLACTONE 25 MG
12.5 TABLET ORAL DAILY
Status: DISCONTINUED | OUTPATIENT
Start: 2025-05-06 | End: 2025-05-08

## 2025-05-06 RX ORDER — FUROSEMIDE 20 MG/1
20 TABLET ORAL DAILY
Status: DISCONTINUED | OUTPATIENT
Start: 2025-05-06 | End: 2025-05-08

## 2025-05-06 RX ORDER — PANTOPRAZOLE SODIUM 40 MG/1
40 TABLET, DELAYED RELEASE ORAL
Status: DISCONTINUED | OUTPATIENT
Start: 2025-05-07 | End: 2025-05-12 | Stop reason: HOSPADM

## 2025-05-06 RX ADMIN — LEVOTHYROXINE SODIUM 125 MCG: 0.12 TABLET ORAL at 06:20

## 2025-05-06 RX ADMIN — QUETIAPINE FUMARATE 25 MG: 25 TABLET ORAL at 20:23

## 2025-05-06 RX ADMIN — VALACYCLOVIR HYDROCHLORIDE 500 MG: 500 TABLET, FILM COATED ORAL at 08:00

## 2025-05-06 RX ADMIN — BUSPIRONE HYDROCHLORIDE 5 MG: 5 TABLET ORAL at 13:10

## 2025-05-06 RX ADMIN — GUAIFENESIN AND DEXTROMETHORPHAN 10 ML: 100; 10 SYRUP ORAL at 20:21

## 2025-05-06 RX ADMIN — FLUTICASONE FUROATE AND VILANTEROL TRIFENATATE 1 PUFF: 200; 25 POWDER RESPIRATORY (INHALATION) at 08:00

## 2025-05-06 RX ADMIN — INSULIN ASPART 1 UNITS: 100 INJECTION, SOLUTION INTRAVENOUS; SUBCUTANEOUS at 20:28

## 2025-05-06 RX ADMIN — TICAGRELOR 90 MG: 90 TABLET ORAL at 18:18

## 2025-05-06 RX ADMIN — FORMOTEROL FUMARATE DIHYDRATE 20 MCG: 20 SOLUTION RESPIRATORY (INHALATION) at 08:10

## 2025-05-06 RX ADMIN — GABAPENTIN 300 MG: 300 CAPSULE ORAL at 13:10

## 2025-05-06 RX ADMIN — BUDESONIDE 0.5 MG: 0.5 INHALANT ORAL at 08:10

## 2025-05-06 RX ADMIN — BUDESONIDE 0.5 MG: 0.5 INHALANT ORAL at 20:35

## 2025-05-06 RX ADMIN — INSULIN ASPART 8 UNITS: 100 INJECTION, SOLUTION INTRAVENOUS; SUBCUTANEOUS at 17:04

## 2025-05-06 RX ADMIN — ALBUTEROL SULFATE 2 PUFF: 90 AEROSOL, METERED RESPIRATORY (INHALATION) at 18:15

## 2025-05-06 RX ADMIN — GABAPENTIN 300 MG: 300 CAPSULE ORAL at 20:22

## 2025-05-06 RX ADMIN — BUSPIRONE HYDROCHLORIDE 5 MG: 5 TABLET ORAL at 20:22

## 2025-05-06 RX ADMIN — VENLAFAXINE HYDROCHLORIDE 75 MG: 75 CAPSULE, EXTENDED RELEASE ORAL at 08:00

## 2025-05-06 RX ADMIN — TICAGRELOR 90 MG: 90 TABLET ORAL at 06:21

## 2025-05-06 RX ADMIN — BUPROPION HYDROCHLORIDE 300 MG: 300 TABLET, EXTENDED RELEASE ORAL at 07:55

## 2025-05-06 RX ADMIN — Medication 10 MG: at 20:21

## 2025-05-06 RX ADMIN — INSULIN ASPART 8 UNITS: 100 INJECTION, SOLUTION INTRAVENOUS; SUBCUTANEOUS at 08:33

## 2025-05-06 RX ADMIN — PANTOPRAZOLE SODIUM 40 MG: 40 INJECTION, POWDER, FOR SOLUTION INTRAVENOUS at 07:55

## 2025-05-06 RX ADMIN — SPIRONOLACTONE 12.5 MG: 25 TABLET, FILM COATED ORAL at 10:48

## 2025-05-06 RX ADMIN — FORMOTEROL FUMARATE DIHYDRATE 20 MCG: 20 SOLUTION RESPIRATORY (INHALATION) at 20:35

## 2025-05-06 RX ADMIN — GABAPENTIN 300 MG: 300 CAPSULE ORAL at 08:00

## 2025-05-06 RX ADMIN — METOPROLOL SUCCINATE 12.5 MG: 25 TABLET, EXTENDED RELEASE ORAL at 20:22

## 2025-05-06 RX ADMIN — METOPROLOL SUCCINATE 12.5 MG: 25 TABLET, EXTENDED RELEASE ORAL at 08:00

## 2025-05-06 RX ADMIN — BUSPIRONE HYDROCHLORIDE 5 MG: 5 TABLET ORAL at 08:00

## 2025-05-06 RX ADMIN — INSULIN ASPART 10 UNITS: 100 INJECTION, SOLUTION INTRAVENOUS; SUBCUTANEOUS at 13:07

## 2025-05-06 RX ADMIN — TRAZODONE HYDROCHLORIDE 50 MG: 50 TABLET ORAL at 20:21

## 2025-05-06 RX ADMIN — FUROSEMIDE 20 MG: 20 TABLET ORAL at 10:48

## 2025-05-06 ASSESSMENT — ACTIVITIES OF DAILY LIVING (ADL)
ADLS_ACUITY_SCORE: 65

## 2025-05-06 NOTE — PROGRESS NOTES
"Imp/plan:  ST MI s/p PCI to LAD - due to tob use, on ticagrelor, rosuvastastin, hold asp due to low hgb (add on CBC today), myalgias on statin, currently on repatha (noted LDL 20 on adm), would cont repatha at home  Consider GI consult given need for ticagrelor due to recent PCI (noted hx of anemia and prior EGD/colon per pt). If bleeding continues, (presumed source given low iron), can change ticagelor to clopidogrel (preferably after one month but sooner if needed)  HFrEF - EF 25-30% - lungs clear today, still with dyspnea no change, off pressors furosemide q24 chagne to oral with spironolactone, Cr 1.32, K 4.7, on metoprolol succinate low dose, raise to bid and evenutally start entresto.  Chagne fursmeide to po and ad [id number] daily.    can downgrade to telemetry    Review of Systems: 12 points negative other than above    /59   Pulse 91   Temp 98.2  F (36.8  C) (Oral)   Resp 16   Ht 1.651 m (5' 5\")   Wt 69.9 kg (154 lb)   LMP 11/23/2012   SpO2 97%   BMI 25.63 kg/m    JVP<7cm, carotids normal  Lungs clear  Cor RRR no c,r,m  Abs soft +BS, no mass  Ext no c,c,e    Lab Results   Component Value Date    HGB 7.3 (L) 05/05/2025     Lab Results   Component Value Date     05/05/2025     No results found for: \"CREATININE\"  No components found for: \"K\"          Satya Marie MD  Interventional Cardiology   [facility]  [phone]    "

## 2025-05-06 NOTE — PROGRESS NOTES
[facility]    Medicine Progress Note - Hospitalist Service    Date of Admission:  5/1/2025    Assessment & Plan   59-year-old female with history of PAD status post right AKA, chronic kidney disease, anxiety, type 2 diabetes, hypertension, C. difficile and known coronary artery disease status post multiple PCI was admitted to ICU after STEMI and found to have severe occlusion of the LAD which was stented with 1 stent to the diagonal and 1 to the LAD.  [facility] [place].     STEMI  Coronary artery disease  - Status post PCI to prox LAD and Ist diagonal branch.    - Management per cardiac team.  On aspirin and Brilinta.  Also has severe RCA disease which is nonoperative due to multiple comorbidities and will need to be managed medically.    Acute cardiogenic pulmonary edema  Acute hypoxic resp failure  - Off of CPAP. On RA now  - Echo- EF 25-30% with anterior, septal and apical wall akinesis, no LV thrombus  - Not being able to diurese due to hypotension. Cardiology started low dose IV lasix 10mg daily. Changed to PO lasix 20mg daily and added low dose spironolactone 5/6. Monitor response    Hypotension  -BP dropped to 60s/30s on 5/2. Mild improvement with 250ml fluid bolus and 25gm of albumin.   -Dobutamine gtt and NE gtt started per cardiology. NE gtt topped 5/4; Dobutamine gtt stopped as of 5/5  -Cortisol level- normal    Type 2 diabetes:   - Hold home glipizide and metformin and start diabetic order set, most recent A1c 7.7 1-month ago.  - Patient requests regular diet  - Blood sugars are in 300s. Increased the sliding scale to medium, and add mealtime novolog 1:10    Hypertension:  - Holding home meds    History of C. difficile:   - Reports diarrhea PTA.   - Cdiff toxin B by PCR+, Cdiff GDH antigen+, Cdiff toxin-. ID consulted. No need for treatment per ID. Avoid unnecessary antibiotics per ID.     Elevated liver enzymes:   - Likely secondary to the above acute  issues, will trend.  - Elevate transaminases with normal alk phos and T bili noted. Patient is s/p lap laurence (many years ago per patient)  - Trend. CT abd/pelvis- moderate fatty infiltration of the liver  - Monitor LFTs closely since statin has been started per cards. Was on Repatha at home    REE on CKD3a  - Creatinine jumped up to 1.47 from 0.9. Patient doesn't have much muscle mass  - REE is likely due to ATN (hypotension) and IV contrast.   - Appreciate nephrology following    Anemia, acute on chronic  -Hgb of 7.7 noted. No e/o bleeding. CT abd/pelvis- no hematoma  -The drop from 11.0-->9.2-->7.7-->7.2 is likely dilutional.   -Trend. Transfuse if Hgb <7 or symptomatic    Right FA bruise/hematoma  -Due to arterial puncture during the coronary angiogram  -Symptomatic treatment    Hypothyroidism: Resume home meds when med rec complete  Mood disorder: Depression, anxiety.  Continue home Effexor and Seroquel  Peripheral neuropathy: Resume home meds, is on multiple medications including gabapentin, Butrans Voltaren, Robaxin and Lidoderm patch  Peripheral vascular disease status post right AKA          Diet: Snacks/Supplements Adult: Magic Cup; With Meals  Regular Diet Adult    DVT Prophylaxis: Pneumatic Compression Devices  Bear Catheter: Not present  Lines: PRESENT      PICC 05/02/25 Triple Lumen Right Brachial vein medial Vasopressor,access-Site Assessment: WDL    Cardiac Monitoring: ACTIVE order. Indication: Post- PCI/Angiogram (24 hours)  Code Status: Full Code      Clinically Significant Risk Factors         # Hyponatremia: Lowest Na = 131 mmol/L in last 2 days, will monitor as appropriate           # Hypertension: Noted on problem list    # Acute heart failure with reduced ejection fraction: last echo with EF <40% and receiving IV diuretics          # DMII: A1C = 7.7 % (Ref range: <5.7 %) within past 6 months   # Overweight: Estimated body mass index is 25.63 kg/m  as calculated from the following:    Height as  "of this encounter: 1.651 m (5' 5\").    Weight as of this encounter: 69.9 kg (154 lb).      # Asthma: noted on problem list        Social Drivers of Health    Depression: At risk (11/29/2024)    [facility]    PHQ-2     PHQ-2 TOTAL SCORE: 4   Housing Stability: High Risk (5/5/2025)    Housing Stability     Do you have housing? : Yes     Are you worried about losing your housing?: Yes   Tobacco Use: High Risk (5/2/2025)    Patient History     Smoking Tobacco Use: Every Day     Smokeless Tobacco Use: Never   Physical Activity: Inactive (3/3/2023)    Received from [facility]    Exercise Vital Sign     Days of Exercise per Week: 0 days     Minutes of Exercise per Session: 0 min   Stress: Stress Concern Present (3/3/2023)    Received from [facility]    [place] [employer] - Occupational Stress Questionnaire     Feeling of Stress : To some extent   Social Connections: Socially Integrated (12/20/2024)    Received from [facility]    Social Connections     Do you often feel lonely or isolated from those around you?: 0   Recent Concern: Social Connections - Socially Isolated (11/29/2024)    [facility]    Social Connections     Do you often feel lonely or isolated from those around you?: 4          Disposition Plan     Medically Ready for Discharge: Anticipated in 2-4 Days             EILEEN Thomas  Hospitalist Service  [facility]  Securely message with [employer] (more info)  Text page via [employer] Paging/Directory   ______________________________________________________________________    Interval History   Patient reports doing about the same. Endorses having some dyspnea. BP was low, 70s/40s again last night    Denies any diarrhea. Denies any chest pain.     Physical Exam   Vital Signs: Temp: 98.4  F (36.9  C) Temp src: Oral BP: 98/60 Pulse: 89   " Resp: 16 SpO2: 96 % O2 Device: None (Room air) Oxygen Delivery: 3 LPM  Weight: 154 lbs 0 oz      General: Not in obvious distress.  HEENT: NC, AT   Chest: Clear to auscultation bilaterally  Heart: S1S2 normal, regular. No M/R/G  Abdomen: Soft. NT, ND. Bowel sounds- active.  Extremities: No leg swelling. Right AKA. Bruise over the anterior aspect of right FA.  Neuro: Alert and awake, grossly non-focal      Medical Decision Making             Data     I have personally reviewed the following data over the past 24 hrs:    12.5 (H)  \   7.3 (L)   / 300     131 (L) 99 24.6 (H) /  230 (H)   4.7 23 1.32 (H) \       Imaging results reviewed over the past 24 hrs:   No results found for this or any previous visit (from the past 24 hours).

## 2025-05-06 NOTE — PLAN OF CARE
Pt denies new pain, nausea, shortness of breath. Oxygenation adequate on room air. Up to commode and chair several times with pivot transfer.

## 2025-05-06 NOTE — PROGRESS NOTES
RCAT Treatment Plan    Patient Score: 6  Patient Acuity: 4    Clinical Indication for Therapy: home regimen and history of mucous producing disease    Therapy Ordered: Flutter valve. Pulmicort and Perforomist nebulizers BID (patient takes Advair inhaler at home).     Assessment Summary: Plan to continue with BID scheduled nebulizers per home regimen. Patient stating she occasionally has hard time coughing up secretions. RT instructed patient on use of flutter valve. She finds it helpful. Patient is doing independently.     Erika Acevedo, RT  5/6/2025

## 2025-05-06 NOTE — PLAN OF CARE
Goal Outcome Evaluation:      Plan of Care Reviewed With: patient    Overall Patient Progress: improvingOverall Patient Progress: improving    Outcome Evaluation: Patient downgraded to cardiac tele this shift. Denies pain. Requested sleep meds. SOB, cough, dyspnea before scheduled nebs. Used rescue inhaler. Requested O2 via nasal cannula for comfort. Hoping to be discharged soon.

## 2025-05-07 ENCOUNTER — APPOINTMENT (OUTPATIENT)
Dept: OCCUPATIONAL THERAPY | Facility: HOSPITAL | Age: 60
End: 2025-05-07
Payer: COMMERCIAL

## 2025-05-07 ENCOUNTER — APPOINTMENT (OUTPATIENT)
Dept: PHYSICAL THERAPY | Facility: HOSPITAL | Age: 60
End: 2025-05-07
Payer: COMMERCIAL

## 2025-05-07 ENCOUNTER — APPOINTMENT (OUTPATIENT)
Dept: RADIOLOGY | Facility: HOSPITAL | Age: 60
End: 2025-05-07
Attending: INTERNAL MEDICINE
Payer: COMMERCIAL

## 2025-05-07 LAB
ANION GAP SERPL CALCULATED.3IONS-SCNC: 10 MMOL/L (ref 7–15)
BASOPHILS # BLD AUTO: 0 10E3/UL (ref 0–0.2)
BASOPHILS NFR BLD AUTO: 0 %
BUN SERPL-MCNC: 29 MG/DL (ref 8–23)
CALCIUM SERPL-MCNC: 8.3 MG/DL (ref 8.8–10.4)
CHLORIDE SERPL-SCNC: 98 MMOL/L (ref 98–107)
CREAT SERPL-MCNC: 1.37 MG/DL (ref 0.51–0.95)
D DIMER PPP FEU-MCNC: 3.42 UG/ML FEU (ref 0–0.5)
EGFRCR SERPLBLD CKD-EPI 2021: 44 ML/MIN/1.73M2
EOSINOPHIL # BLD AUTO: 0.1 10E3/UL (ref 0–0.7)
EOSINOPHIL NFR BLD AUTO: 1 %
ERYTHROCYTE [DISTWIDTH] IN BLOOD BY AUTOMATED COUNT: 16.3 % (ref 10–15)
ERYTHROCYTE [DISTWIDTH] IN BLOOD BY AUTOMATED COUNT: 16.6 % (ref 10–15)
FOLATE SERPL-MCNC: 14.5 NG/ML (ref 4.6–34.8)
GLUCOSE BLDC GLUCOMTR-MCNC: 179 MG/DL (ref 70–99)
GLUCOSE BLDC GLUCOMTR-MCNC: 189 MG/DL (ref 70–99)
GLUCOSE BLDC GLUCOMTR-MCNC: 208 MG/DL (ref 70–99)
GLUCOSE BLDC GLUCOMTR-MCNC: 220 MG/DL (ref 70–99)
GLUCOSE SERPL-MCNC: 193 MG/DL (ref 70–99)
HAPTOGLOB SERPL-MCNC: 417 MG/DL (ref 30–200)
HCO3 SERPL-SCNC: 23 MMOL/L (ref 22–29)
HCT VFR BLD AUTO: 23.4 % (ref 35–47)
HCT VFR BLD AUTO: 24 % (ref 35–47)
HGB BLD-MCNC: 7 G/DL (ref 11.7–15.7)
HGB BLD-MCNC: 7.1 G/DL (ref 11.7–15.7)
HOLD SPECIMEN: NORMAL
IMM GRANULOCYTES # BLD: 0.1 10E3/UL
IMM GRANULOCYTES NFR BLD: 1 %
IRON BINDING CAPACITY (ROCHE): 281 UG/DL (ref 240–430)
IRON SATN MFR SERPL: 7 % (ref 15–46)
IRON SERPL-MCNC: 19 UG/DL (ref 37–145)
LDH SERPL L TO P-CCNC: 618 U/L (ref 0–250)
LYMPHOCYTES # BLD AUTO: 0.6 10E3/UL (ref 0.8–5.3)
LYMPHOCYTES NFR BLD AUTO: 5 %
MAGNESIUM SERPL-MCNC: 1.7 MG/DL (ref 1.7–2.3)
MCH RBC QN AUTO: 22.4 PG (ref 26.5–33)
MCH RBC QN AUTO: 22.7 PG (ref 26.5–33)
MCHC RBC AUTO-ENTMCNC: 29.6 G/DL (ref 31.5–36.5)
MCHC RBC AUTO-ENTMCNC: 29.9 G/DL (ref 31.5–36.5)
MCV RBC AUTO: 76 FL (ref 78–100)
MCV RBC AUTO: 76 FL (ref 78–100)
MONOCYTES # BLD AUTO: 0.7 10E3/UL (ref 0–1.3)
MONOCYTES NFR BLD AUTO: 6 %
NEUTROPHILS # BLD AUTO: 9.4 10E3/UL (ref 1.6–8.3)
NEUTROPHILS NFR BLD AUTO: 87 %
NRBC # BLD AUTO: 0 10E3/UL
NRBC BLD AUTO-RTO: 0 /100
NT-PROBNP SERPL-MCNC: NORMAL PG/ML
PATH REPORT.COMMENTS IMP SPEC: NORMAL
PATH REPORT.FINAL DX SPEC: NORMAL
PATH REPORT.MICROSCOPIC SPEC OTHER STN: NORMAL
PATH REPORT.RELEVANT HX SPEC: NORMAL
PLATELET # BLD AUTO: 315 10E3/UL (ref 150–450)
PLATELET # BLD AUTO: 331 10E3/UL (ref 150–450)
POTASSIUM SERPL-SCNC: 4.5 MMOL/L (ref 3.4–5.3)
RBC # BLD AUTO: 3.09 10E6/UL (ref 3.8–5.2)
RBC # BLD AUTO: 3.17 10E6/UL (ref 3.8–5.2)
RETICS # AUTO: 0.04 10E6/UL (ref 0.03–0.1)
RETICS # AUTO: 0.04 10E6/UL (ref 0.03–0.1)
RETICS/RBC NFR AUTO: 1.3 % (ref 0.5–2)
RETICS/RBC NFR AUTO: 1.3 % (ref 0.5–2)
SODIUM SERPL-SCNC: 131 MMOL/L (ref 135–145)
VIT B12 SERPL-MCNC: 455 PG/ML (ref 232–1245)
WBC # BLD AUTO: 10.9 10E3/UL (ref 4–11)
WBC # BLD AUTO: 11.4 10E3/UL (ref 4–11)

## 2025-05-07 PROCEDURE — 82565 ASSAY OF CREATININE: CPT | Performed by: INTERNAL MEDICINE

## 2025-05-07 PROCEDURE — 85045 AUTOMATED RETICULOCYTE COUNT: CPT | Performed by: PHYSICIAN ASSISTANT

## 2025-05-07 PROCEDURE — 85060 BLOOD SMEAR INTERPRETATION: CPT | Performed by: PATHOLOGY

## 2025-05-07 PROCEDURE — 250N000013 HC RX MED GY IP 250 OP 250 PS 637: Performed by: EMERGENCY MEDICINE

## 2025-05-07 PROCEDURE — 83010 ASSAY OF HAPTOGLOBIN QUANT: CPT | Performed by: PHYSICIAN ASSISTANT

## 2025-05-07 PROCEDURE — 250N000013 HC RX MED GY IP 250 OP 250 PS 637: Performed by: INTERNAL MEDICINE

## 2025-05-07 PROCEDURE — 99233 SBSQ HOSP IP/OBS HIGH 50: CPT | Performed by: STUDENT IN AN ORGANIZED HEALTH CARE EDUCATION/TRAINING PROGRAM

## 2025-05-07 PROCEDURE — 85027 COMPLETE CBC AUTOMATED: CPT | Performed by: PHYSICIAN ASSISTANT

## 2025-05-07 PROCEDURE — 999N000157 HC STATISTIC RCP TIME EA 10 MIN

## 2025-05-07 PROCEDURE — 97535 SELF CARE MNGMENT TRAINING: CPT | Mod: GO

## 2025-05-07 PROCEDURE — 250N000011 HC RX IP 250 OP 636: Performed by: STUDENT IN AN ORGANIZED HEALTH CARE EDUCATION/TRAINING PROGRAM

## 2025-05-07 PROCEDURE — 82746 ASSAY OF FOLIC ACID SERUM: CPT | Performed by: PHYSICIAN ASSISTANT

## 2025-05-07 PROCEDURE — 83540 ASSAY OF IRON: CPT | Performed by: STUDENT IN AN ORGANIZED HEALTH CARE EDUCATION/TRAINING PROGRAM

## 2025-05-07 PROCEDURE — 250N000013 HC RX MED GY IP 250 OP 250 PS 637: Performed by: STUDENT IN AN ORGANIZED HEALTH CARE EDUCATION/TRAINING PROGRAM

## 2025-05-07 PROCEDURE — 97530 THERAPEUTIC ACTIVITIES: CPT | Mod: GP

## 2025-05-07 PROCEDURE — 83880 ASSAY OF NATRIURETIC PEPTIDE: CPT | Performed by: INTERNAL MEDICINE

## 2025-05-07 PROCEDURE — 94640 AIRWAY INHALATION TREATMENT: CPT

## 2025-05-07 PROCEDURE — 85025 COMPLETE CBC W/AUTO DIFF WBC: CPT | Performed by: INTERNAL MEDICINE

## 2025-05-07 PROCEDURE — 99232 SBSQ HOSP IP/OBS MODERATE 35: CPT | Performed by: INTERNAL MEDICINE

## 2025-05-07 PROCEDURE — 85379 FIBRIN DEGRADATION QUANT: CPT | Performed by: INTERNAL MEDICINE

## 2025-05-07 PROCEDURE — 83615 LACTATE (LD) (LDH) ENZYME: CPT | Performed by: PHYSICIAN ASSISTANT

## 2025-05-07 PROCEDURE — 82607 VITAMIN B-12: CPT | Performed by: PHYSICIAN ASSISTANT

## 2025-05-07 PROCEDURE — 83735 ASSAY OF MAGNESIUM: CPT | Performed by: INTERNAL MEDICINE

## 2025-05-07 PROCEDURE — 250N000009 HC RX 250: Performed by: INTERNAL MEDICINE

## 2025-05-07 PROCEDURE — 94640 AIRWAY INHALATION TREATMENT: CPT | Mod: 76

## 2025-05-07 PROCEDURE — 120N000004 HC R&B MS OVERFLOW

## 2025-05-07 PROCEDURE — 71045 X-RAY EXAM CHEST 1 VIEW: CPT

## 2025-05-07 RX ORDER — CLOPIDOGREL BISULFATE 75 MG/1
75 TABLET ORAL DAILY
Status: DISCONTINUED | OUTPATIENT
Start: 2025-05-09 | End: 2025-05-12 | Stop reason: HOSPADM

## 2025-05-07 RX ORDER — CLOPIDOGREL BISULFATE 75 MG/1
300 TABLET ORAL ONCE
Status: DISCONTINUED | OUTPATIENT
Start: 2025-05-08 | End: 2025-05-08

## 2025-05-07 RX ADMIN — METOPROLOL SUCCINATE 12.5 MG: 25 TABLET, EXTENDED RELEASE ORAL at 09:06

## 2025-05-07 RX ADMIN — BUSPIRONE HYDROCHLORIDE 5 MG: 5 TABLET ORAL at 09:06

## 2025-05-07 RX ADMIN — VALACYCLOVIR HYDROCHLORIDE 500 MG: 500 TABLET, FILM COATED ORAL at 09:07

## 2025-05-07 RX ADMIN — FORMOTEROL FUMARATE DIHYDRATE 20 MCG: 20 SOLUTION RESPIRATORY (INHALATION) at 08:02

## 2025-05-07 RX ADMIN — GUAIFENESIN AND DEXTROMETHORPHAN 10 ML: 100; 10 SYRUP ORAL at 20:37

## 2025-05-07 RX ADMIN — Medication 10 MG: at 20:38

## 2025-05-07 RX ADMIN — SPIRONOLACTONE 12.5 MG: 25 TABLET, FILM COATED ORAL at 09:05

## 2025-05-07 RX ADMIN — GABAPENTIN 300 MG: 300 CAPSULE ORAL at 13:40

## 2025-05-07 RX ADMIN — LEVOTHYROXINE SODIUM 125 MCG: 0.12 TABLET ORAL at 09:04

## 2025-05-07 RX ADMIN — GABAPENTIN 300 MG: 300 CAPSULE ORAL at 09:05

## 2025-05-07 RX ADMIN — QUETIAPINE FUMARATE 25 MG: 25 TABLET ORAL at 20:39

## 2025-05-07 RX ADMIN — VENLAFAXINE HYDROCHLORIDE 75 MG: 75 CAPSULE, EXTENDED RELEASE ORAL at 09:07

## 2025-05-07 RX ADMIN — PANTOPRAZOLE SODIUM 40 MG: 40 TABLET, DELAYED RELEASE ORAL at 09:05

## 2025-05-07 RX ADMIN — FLUTICASONE FUROATE AND VILANTEROL TRIFENATATE 1 PUFF: 200; 25 POWDER RESPIRATORY (INHALATION) at 09:04

## 2025-05-07 RX ADMIN — ALBUTEROL SULFATE 2 PUFF: 90 AEROSOL, METERED RESPIRATORY (INHALATION) at 18:46

## 2025-05-07 RX ADMIN — BUPROPION HYDROCHLORIDE 300 MG: 300 TABLET, EXTENDED RELEASE ORAL at 09:07

## 2025-05-07 RX ADMIN — FUROSEMIDE 20 MG: 20 TABLET ORAL at 09:05

## 2025-05-07 RX ADMIN — TICAGRELOR 90 MG: 90 TABLET ORAL at 06:15

## 2025-05-07 RX ADMIN — INSULIN ASPART 2 UNITS: 100 INJECTION, SOLUTION INTRAVENOUS; SUBCUTANEOUS at 09:42

## 2025-05-07 RX ADMIN — INSULIN ASPART 10 UNITS: 100 INJECTION, SOLUTION INTRAVENOUS; SUBCUTANEOUS at 18:02

## 2025-05-07 RX ADMIN — ONDANSETRON 4 MG: 2 INJECTION, SOLUTION INTRAMUSCULAR; INTRAVENOUS at 20:49

## 2025-05-07 RX ADMIN — BUSPIRONE HYDROCHLORIDE 5 MG: 5 TABLET ORAL at 13:39

## 2025-05-07 RX ADMIN — FORMOTEROL FUMARATE DIHYDRATE 20 MCG: 20 SOLUTION RESPIRATORY (INHALATION) at 20:43

## 2025-05-07 RX ADMIN — METOPROLOL SUCCINATE 12.5 MG: 25 TABLET, EXTENDED RELEASE ORAL at 20:52

## 2025-05-07 RX ADMIN — GABAPENTIN 300 MG: 300 CAPSULE ORAL at 20:38

## 2025-05-07 RX ADMIN — INSULIN ASPART: 100 INJECTION, SOLUTION INTRAVENOUS; SUBCUTANEOUS at 13:38

## 2025-05-07 RX ADMIN — BUDESONIDE 0.5 MG: 0.5 INHALANT ORAL at 08:02

## 2025-05-07 RX ADMIN — TRAZODONE HYDROCHLORIDE 50 MG: 50 TABLET ORAL at 20:38

## 2025-05-07 RX ADMIN — BUDESONIDE 0.5 MG: 0.5 INHALANT ORAL at 20:43

## 2025-05-07 RX ADMIN — BUSPIRONE HYDROCHLORIDE 5 MG: 5 TABLET ORAL at 20:52

## 2025-05-07 ASSESSMENT — ACTIVITIES OF DAILY LIVING (ADL)
ADLS_ACUITY_SCORE: 64
ADLS_ACUITY_SCORE: 65
ADLS_ACUITY_SCORE: 65
ADLS_ACUITY_SCORE: 64
ADLS_ACUITY_SCORE: 65
ADLS_ACUITY_SCORE: 65
ADLS_ACUITY_SCORE: 64
ADLS_ACUITY_SCORE: 64

## 2025-05-07 NOTE — CONSULTS
[facility] Consultation     Gloria Penn  [address]   [place] [address]  60 year old female     Admission Date/Time: 5/1/2025  Primary Care Provider: Jose Lawrence     We were asked to see the patient in consultation by Dr. Bullock for evaluation of anemia.    ASSESSMENT:    Gloria Penn is a 60 year old female with PMH of iron deficiency anemia, asthma, diabetes type 2, hypertension, tobacco use disorder, history of cholecystectomy, history of C. difficile, CAD who was admitted on 5/1/2025 for STEMI found with severe occlusion of LAD s/p PCI.  GI consulted on 5/7/2025 due to downtrending hemoglobin without evidence of GI blood loss.    Iron deficiency anemia  Downtrending hemoglobin from 11 (on 5/1) to 7.1 (on 5/7).  Recently placed on ticagrelor and aspirin due to STEMI s/p Stent placement. Patient has no obvious evidence of GI bleeding, no hematemesis, bloody stools, or melena.  She does have a right arm hematoma from her recent arterial puncture from a coronary angiogram. CT abdomen/pelvis 5/2 without source of GI bleed.   - Iron studies consistent with iron deficiency but likely has component of chronic disease. Recent EGD/Colonoscopy in 06/2022 for iron deficiency anemia ([facility]) was negative for sources of bleeding but mentioned chronic peptic type duodenitis.   - Hemolysis labs, blood morphology, Vit B12, and folic acid ordered.     2. STEMI s/p PCI to LAD  - Cardiology following. Patient was started on aspirin and ticagrelor but cardiology planning to switch to Plavix. Aspirin currently on hold due to down trending Hgb. She also has severe RCA disease, nonoperatable due to comorbidities.     3. History of C difficile  C diff GDH antigen positive, toxin negative. ID not recommending treatment.     RECOMMENDATIONS:  - Hemolysis labs, folic acid, vitamin B12, and peripheral smear has been ordered    - No immediate plans for endoscopic evaluation given recent STEMI  and no obvious evidence of GI bleeding. However, I will need to connect with Cardiology to see if they would like for us to proceed with endoscopic evaluation given chronic need for DAPT. Of note, previous EGD/Colonoscopy for anemia in 06/2022 was unremarkable.     Addendum: Discussed with Dr. Marie, cardiology planning to switch pt to clopidogrel and cannot have this stopped within the next 1 month due to risk of thrombus formation. Will tentatively plan for EGD tomorrow.    - Monitor Hgb and transfuse as necessary    - Ok with PPI therapy to treat potential gastritis/duodenitis       Case discussed with Dr. Kline.    50 minutes of total time was spent providing patient care, including patient evaluation, reviewing documentation/test results, and     Kody Bliss PA-C  [facility]  [phone]  ________________________________________________________________________        CC: Chest pains     HPI:  Gloria Penn is a 60 year old female with PMH of iron deficiency anemia, asthma, diabetes type 2, hypertension, tobacco use disorder, history of cholecystectomy, history of C. difficile, CAD who was admitted on 5/1/2025 for STEMI found with severe occlusion of LAD s/p PCI.  GI consulted on 5/7/2025 due to downtrending hemoglobin without evidence of GI blood loss.     In short, patient presented to the hospital for symptoms of nausea, vomiting, diarrhea.  While in the ED, she developed chest pains with EKG showing STEMI and brought to Cath Lab showing severe vascular disease with occlusion of the LAD s/p stent placement.  She was placed on aspirin and Brilinta.  She is in the ICU due to hypotension with BP dropping down to 60s/30s, now off pressors. Hemoglobin has been downtrending since admission (Hgb 11 -> 9.2 -> 7.7 -> 7.2 -> 7.1).  Patient denies evidence of GI bleeding, no hematemesis, bloody stools, melena.  No epistaxis.  She does have right forearm hematoma which was recent due to  arterial puncture during coronary angiogram.  She is not on NSAIDs.  She has history of chronic tobacco use.  No current alcohol use.    Regarding her anemia, patient states she has a 15 to 20-year history of iron deficiency anemia requiring intermittent supplementation with iron.  She states she had recent GI evaluation in 06/2022 at the [facility].  EGD noted chronic peptic duodenitis but otherwise unremarkable.  Colonoscopy noted tubular adenoma x 2 but no obvious source of GI bleeding.      Iron studies here show concerns for iron deficiency (iron level 19, iron saturation 7%, TIBC 281).      ROS: A comprehensive ten point review of systems was negative aside from those in mentioned in the HPI.      PAST MEDICAL HISTORY:  Patient Active Problem List    Diagnosis Date Noted    Type 2 diabetes, HbA1c goal < 7% (H) 10/31/2010     Priority: High    ACS (acute coronary syndrome) (H) 05/01/2025     Priority: Medium    Nausea vomiting and diarrhea 05/01/2025     Priority: Medium    Intermittent chest pain 05/01/2025     Priority: Medium    ST elevation myocardial infarction involving left anterior descending (LAD) coronary artery (H) 05/01/2025     Priority: Medium    History of Clostridioides difficile infection 05/01/2025     Priority: Medium    Percutaneous transluminal coronary angioplasty status 05/01/2025     Priority: Medium    Emphysema/COPD (H) 12/31/2012     Priority: Medium    Peripheral autonomic neuropathy due to DM (H) 05/04/2012     Priority: Medium    Hypertension, benign essential, goal below 140/90 02/03/2012     Priority: Medium    Generalized anxiety disorder 10/24/2011     Priority: Medium     Pt has been prescribed Klonopin 1 mg PO BID, since at least Jan 2012.  Pt also on Zoloft.  Pt previously followed by Dr. Philippe.  Pt seen on 12/31/12, but this medication not addressed at that time.    Diagnosis updated by automated process. Provider to review and confirm.      Major depressive disorder,  single episode, mild 09/09/2011     Priority: Medium    Hypothyroidism (acquired) 08/26/2011     Priority: Medium     History of grave's and thyroidectomy      Hyperlipidemia LDL goal <100 10/31/2010     Priority: Medium    Moderate persistent asthma 08/10/2009     Priority: Medium    GERD (gastroesophageal reflux disease) 08/10/2009     Priority: Medium    Smoker 08/10/2009     Priority: Medium    Gastroparesis due to secondary diabetes (H) 08/10/2009     Priority: Medium     SOCIAL HISTORY:  Social History     Tobacco Use    Smoking status: Every Day     Current packs/day: 0.50     Average packs/day: 0.5 packs/day for 32.0 years (16.0 ttl pk-yrs)     Types: Cigarettes    Smokeless tobacco: Never   Substance Use Topics    Alcohol use: Yes     Comment: very rare     Drug use: No     FAMILY HISTORY:  Family History   Problem Relation Age of Onset    Diabetes Mother     Hypertension Mother     Cerebrovascular Disease Mother     Cardiovascular Mother     Depression Mother     Obesity Mother     Osteoporosis Mother     Thyroid Disease Mother     Heart Disease Mother     Diabetes Father     Hypertension Father     Depression Father     Obesity Father     Heart Disease Father     Alcohol/Drug Maternal Grandmother     Depression Maternal Grandmother         cataracts and glaucoma    Cerebrovascular Disease Maternal Grandfather      ALLERGIES:   Allergies   Allergen Reactions    Azithromycin Hives    Levaquin     Pcn [Penicillins] Rash     Rash as a child.    Pt tolerated IV ceftriaxone in ER on 10/7/23.     MEDICATIONS:   Current Facility-Administered Medications   Medication Dose Route Frequency Provider Last Rate Last Admin    albuterol (PROVENTIL HFA/VENTOLIN HFA) inhaler  2 puff Inhalation Q4H PRN Tej Weller MD   2 puff at 05/06/25 1815    budesonide (PULMICORT) neb solution 0.5 mg  0.5 mg Nebulization 2 times daily Maicol Gao MBBS   0.5 mg at 05/07/25 0802    buprenorphine (BUTRANS) 5 MCG/HR WK  patch 1 patch  1 patch Transdermal Weekly Tej Weller MD        And    buprenorphine (BUTRANS) Patch in Place   Transdermal Q8H [facility] Tej Weller MD        buPROPion (WELLBUTRIN XL) 24 hr tablet 300 mg  300 mg Oral QAM Tej Weller MD   300 mg at 05/07/25 0907    busPIRone (BUSPAR) tablet 5 mg  5 mg Oral TID Tej Weller MD   5 mg at 05/07/25 0906    [START ON 5/8/2025] clopidogrel (PLAVIX) tablet 300 mg  300 mg Oral Once Satya Marie MD        [START ON 5/9/2025] clopidogrel (PLAVIX) tablet 75 mg  75 mg Oral Daily Satya Marie MD        Continuing beta blocker from home medication list OR beta blocker order already placed during this visit   Does not apply DOES NOT GO TO Isiah Roger MD        glucose gel 15-30 g  15-30 g Oral Q15 Min PRN Tej Weller MD        Or    dextrose 50 % injection 25-50 mL  25-50 mL Intravenous Q15 Min PRN Tej Weller MD        Or    glucagon injection 1 mg  1 mg Subcutaneous Q15 Min PRN Tej Weller MD        diclofenac (VOLTAREN) 1 % topical gel 2 g  2 g Topical 4x Daily PRN Tej Weller MD        fluticasone-vilanterol (BREO ELLIPTA) 200-25 MCG/ACT inhaler 1 puff  1 puff Inhalation Daily Tej Weller MD   1 puff at 05/07/25 0904    formoterol (PERFOROMIST) neb solution 20 mcg  20 mcg Nebulization 2 times daily Maicol Gao MBBS   20 mcg at 05/07/25 0802    furosemide (LASIX) tablet 20 mg  20 mg Oral Daily Satya Marie MD   20 mg at 05/07/25 0905    gabapentin (NEURONTIN) capsule 300 mg  300 mg Oral TID Tej Weller MD   300 mg at 05/07/25 0905    guaiFENesin-dextromethorphan (ROBITUSSIN DM) 100-10 MG/5ML syrup 10 mL  10 mL Oral Q4H PRN Maicol Gao MBBS   10 mL at 05/06/25 2021    hydrALAZINE (APRESOLINE) injection 10 mg  10 mg Intravenous Q4H PRN Isiah Ren MD        insulin aspart (NovoLOG) injection (RAPID ACTING)  1-7 Units  Subcutaneous TID AC Maicol Gao B, MBBS   1 Units at 05/07/25 0941    insulin aspart (NovoLOG) injection (RAPID ACTING)  1-5 Units Subcutaneous At Bedtime Maicol Gao B, MBBS   1 Units at 05/06/25 2028    insulin aspart (NovoLOG) injection (RAPID ACTING)   Subcutaneous TID w/meals Maicol Gao B, MBBS   2 Units at 05/07/25 0942    insulin glargine (LANTUS PEN) injection 25 Units  25 Units Subcutaneous At Bedtime Maicol Gao B, MBBS   25 Units at 05/06/25 2027    ipratropium - albuterol 0.5 mg/2.5 mg/3 mL (DUONEB) neb solution 3 mL  1 vial Nebulization Q6H PRN Tej Weller MD   3 mL at 05/02/25 1510    levothyroxine (SYNTHROID/LEVOTHROID) tablet 125 mcg  125 mcg Oral QAM AC Tej Weller MD   125 mcg at 05/07/25 0904    Lidocaine (LIDOCARE) 4 % Patch 1 patch  1 patch Transdermal Q24H GeoffreyiMaicol B, MBBS   1 patch at 05/04/25 1010    melatonin tablet 10 mg  10 mg Oral At Bedtime Yan Sauer DO   10 mg at 05/06/25 2021    [Held by provider] metFORMIN (GLUCOPHAGE XR) 24 hr tablet 1,000 mg  1,000 mg Oral BID w/meals Tej Weller MD        methocarbamol (ROBAXIN) tablet 500 mg  500 mg Oral Q6H PRN Tej eWller MD        metoprolol (LOPRESSOR) injection 5 mg  5 mg Intravenous Q15 Min PRN Isiah Ren MD        metoprolol succinate ER (TOPROL-XL) 24 hr half-tab 12.5 mg  12.5 mg Oral BID Satya Marie MD   12.5 mg at 05/07/25 0906    midazolam (VERSED) injection 0.5 mg  0.5 mg Intravenous Q5 Min PRN Isiah Ren MD        naloxone (NARCAN) injection 0.2 mg  0.2 mg Intravenous Q2 Min PRN Isiah Ren MD        Or    naloxone (NARCAN) injection 0.4 mg  0.4 mg Intravenous Q2 Min PRN Isiah Ren MD        Or    naloxone (NARCAN) injection 0.2 mg  0.2 mg Intramuscular Q2 Min PRN Isiah Ren MD        Or    naloxone (NARCAN) injection 0.4 mg  0.4 mg Intramuscular Q2 Min PRN Isiah Ren MD        nitroGLYcerin (NITROSTAT) sublingual tablet  "0.4 mg  0.4 mg Sublingual Q5 Min PRN Isiah Ren MD        ondansetron (ZOFRAN ODT) ODT tab 4 mg  4 mg Oral Q6H PRN Iisah Ren MD        Or    ondansetron (ZOFRAN) injection 4 mg  4 mg Intravenous Q6H PRN Isiah Ren MD        oxyCODONE (ROXICODONE) tablet 5 mg  5 mg Oral Q4H PRN Isiah Ren MD   5 mg at 05/03/25 1445    Or    oxyCODONE (ROXICODONE) tablet 10 mg  10 mg Oral Q4H PRN Isiah Ren MD        pantoprazole (PROTONIX) EC tablet 40 mg  40 mg Oral QAM AC Bairagi, Maicol B, MBBS   40 mg at 05/07/25 0905    Percutaneous Coronary Intervention orders placed (this is information for BPA alerting)   Does not apply DOES NOT GO TO MAR Isiah Ren MD        QUEtiapine (SEROquel) tablet 25 mg  25 mg Oral At Bedtime Yan Sauer, DO   25 mg at 05/06/25 2023    sodium chloride (PF) 0.9% PF flush 10-20 mL  10-20 mL Intracatheter q1 min prn Bairagi, Maicol B, MBBS        sodium chloride (PF) 0.9% PF flush 10-40 mL  10-40 mL Intracatheter Once PRN Marcus Siddiqui,         sodium chloride (PF) 0.9% PF flush 10-40 mL  10-40 mL Intracatheter Q7 Days Bairagi, Maicol B, MBBS   10 mL at 05/02/25 2155    sodium chloride (PF) 0.9% PF flush 10-40 mL  10-40 mL Intracatheter Daily Bairagi, Maicol B, MBBS   30 mL at 05/07/25 0910    spironolactone (ALDACTONE) half-tab 12.5 mg  12.5 mg Oral Daily Satya Marie MD   12.5 mg at 05/07/25 0905    topiramate (TOPAMAX) tablet 25 mg  25 mg Oral BID PRN Tej Weller MD        traZODone (DESYREL) tablet 50 mg  50 mg Oral At Bedtime PRN Tej Weller MD   50 mg at 05/06/25 2021    valACYclovir (VALTREX) tablet 500 mg  500 mg Oral Daily Tej Weller MD   500 mg at 05/07/25 0907    venlafaxine (EFFEXOR XR) 24 hr capsule 75 mg  75 mg Oral Daily Tej Weller MD   75 mg at 05/07/25 0907       PHYSICAL EXAM:   BP 98/53   Pulse 85   Temp 98.9  F (37.2  C) (Oral)   Resp 18   Ht 1.651 m (5' 5\")   Wt 70.8 kg (156 lb)  "  LMP 11/23/2012   SpO2 99%   BMI 25.96 kg/m     GEN: Alert, oriented x3, communicative and in NAD.  MIRTHA: AT, anicteric, OP without erythema, exudate, or ulcers.    NECK: Supple.    LYMPH: No LAD noted.  HRT: RRR  LUNGS: CTA  ABD: ND, +BS, no guarding or pain to palpation, no rebound  SKIN: No rash, jaundice or spider angiomata  MSKL: + Mild right forearm bruising, right sided lower extremity amputation  NEURO: CN grossly intact     ADDITIONAL DATA:   I reviewed the patient's new clinical lab test results.   Recent Labs   Lab Test 05/07/25  1114 05/07/25  0614 05/06/25  0952 05/02/25  0420 05/01/25  1615   WBC 10.9 11.4* 12.5*   < > 16.6*   HGB 7.1* 7.0* 7.3*   < > 11.0*   MCV 76* 76* 76*   < > 75*    315 300   < > 349   INR  --   --   --   --  1.03    < > = values in this interval not displayed.     Recent Labs   Lab Test 05/07/25  0614 05/06/25  0416 05/05/25  0420   POTASSIUM 4.5 4.7 4.4   CHLORIDE 98 99 103   CO2 23 23 25   BUN 29.0* 24.6* 19.6   CR 1.37* 1.32* 0.92   ANIONGAP 10 9 7     Recent Labs   Lab Test 05/03/25  0413 05/02/25  0420 05/01/25  1615 04/01/25  1208 03/24/25  1130 03/20/25  1030   ALBUMIN 3.7 3.7 4.2  --   --   --    BILITOTAL 0.4 0.3 0.2  --   --   --    ALT 54* 57* 55*  --   --   --    * 491* 450*  --   --   --    PROTEIN  --   --   --  Negative 10* 10*   LIPASE  --   --  26  --   --   --         IMAGING:  I reviewed the patient's new imaging results.        Narrative & Impression   EXAM: CT ABDOMEN PELVIS W CONTRAST  LOCATION: [facility]  DATE: 05/02/2025     INDICATION: Generalized abdominal pain, diarrhea, vomiting, recent C diff infection.  COMPARISON: CT 09/26/2024.  TECHNIQUE: CT scan of the abdomen and pelvis was performed following injection of IV contrast. Multiplanar reformats were obtained. Dose reduction techniques were used.  CONTRAST: Isovue 370: 90 mL.     FINDINGS:   LOWER CHEST: Minimal dependent atelectasis in both lung bases.  Moderate coronary artery calcification. Prominent interlobular septae raise the possibility for pulmonary edema.     HEPATOBILIARY: Moderate fatty infiltration of the liver. The gallbladder is surgically absent. Normal variant reservoir effect seen upon the common bile duct. No indeterminate lesions or masses in the liver. Portal veins are patent.     PANCREAS: Normal.     SPLEEN: Normal.     ADRENAL GLANDS: Tiny bilateral benign adenomas with no significant changes since 09/06/2024. No followup recommended.     KIDNEYS/BLADDER: Benign cyst in the right kidney with no followup recommended. There is a Bear catheter in the bladder and this appears to be in good position. The urinary tract is otherwise unremarkable.     BOWEL: Normal to include the appendix.     LYMPH NODES: Normal.     VASCULATURE: Advanced calcification with no aneurysm. There are numerous stents seen in the pelvis and in the right groin region.     PELVIC ORGANS: Normal.     MUSCULOSKELETAL: Stable stranding of the subcutaneous fat, most marked in the pelvic region. Diffuse osteopenia. Moderate degenerative changes, most marked in the lower lumbar spine facet joints. Compression fracture of T12 with no significant edema,   this is likely chronic in nature and appears stable since chest radiograph 02/18/2025.                                                                      IMPRESSION:   1.  No obvious abnormalities seen to explain patient's pain and diarrhea clinically. No evidence for enteritis or colitis identified.     2.  Moderate fatty infiltration of the liver.     3.  Moderate coronary artery calcification.     4.  Prominent interlobular septae in both lung bases raising the possibility for pulmonary edema with no pleural effusions.     5.  The gallbladder is surgically absent with normal variant reservoir effect seen on the common bile duct.     6.  Stable tiny benign bilateral adrenal adenomas.     7.  Benign cyst in the right kidney  with no followup recommended.     8.  Bear catheter in the bladder and this appears to be in good position.     9.  Diffuse osteopenia with old-appearing superior endplate compression fracture at T12.        Staff Addendum:    The patient was seen and evaluated in conjunction with the advanced practice provider. Please see their note for details.  History of multiple comorbidities as above including recent STEMI with PCI of LAD.  Hemoglobin has dropped from 11-7.1 without gross signs of bleeding.  She has had prior anemia workup as above in 2022.  She has been on aspirin and ticagrelor but they are planning to switch to Plavix.  She did have positive C. difficile testing but the antigen was positive with negative toxin and therefore ID did not feel it is an active infection.    The patient is eating lunch, denies GI symptoms at home, wishes to discharge as quickly as possible.  No abdominal pain.    Current hemoglobin is 7.1 which is stable from this morning and yesterday morning.  In fact, she has had stable hemoglobin since 5/4.    This is a 60-year-old woman with multiple comorbidities who has acute microcytic on chronic normocytic anemia.  No gross signs of GI bleeding.  I can understand the cardiology team's concern given her recent PCI and need for antiplatelet therapy.  Peptic ulcer disease or other upper GI process, including a vascular lesion, is always a possibility.  I am less concerned about a lower GI source of anemia (aside from possible AVM) because she had a recent evaluation 3 years ago.    We are planning for an upper endoscopy tomorrow in the OR with MAC anesthesia.  This will evaluate for atrophic gastritis and celiac disease, as well as any obvious high risk lesions.  I will plan to defer on colonoscopy for now.  I agree with continued antiplatelet therapy.  Thank you for involving us in her care.    Total time spent providing patient care: 24 minutes with at least 50% spent in reviewing  documentation/test results, decision making, and coordination of care.     Candice Kline MD  5/7/20253:01 PM  [facility]

## 2025-05-07 NOTE — PLAN OF CARE
VSS. Requested trazodone for sleep.   Short of breath prior to scheduled nebs. On 2L NC. Rescue inhaler used once.     Downgraded. Awaiting cardiac tele bed.

## 2025-05-07 NOTE — PLAN OF CARE
Goal Outcome Evaluation:                 Outcome Evaluation: Patient rating pain in right upper extremity 6/10, declines need for PRN management and refused scheduled lidocain patch, also states heat/cold have not been effective, will follow. Patient does c/o SOB with congested cough, remains on 2L via NC. Writer inquired about prosthetic use, patient voiced frustration because she states that when she left the TCU there was equipment she needed that was never delivered. Spoke with care management who will follow up wit hthe patient.

## 2025-05-07 NOTE — PROGRESS NOTES
"Imp/plan:  S/p MI with PCI on ticagrelor, no asp, hgb down 7.0 (not likely dilutional), advise GI consult, cont repatha  HFrEF - slightly higher Cr 1.37, K 4.5, metoprlol 12.5mg bid, change to po furosemide 20mg, spironolactone 12.5mg daily, no arrythmias overnight.   Dyspnea with episodic dyspnea on 3 L NC - lungs clear, she had large UOP yesterday with iv furosemide, obtain CXR portable, add on BNP, d-dimer.  Possible reaction to ticagrelor in addition to anemia, will change to clopidogrel.  Possible underlying COPD as well complicating symptoms and sat.      Review of Systems: 12 points negative other than above    BP 98/53   Pulse 85   Temp 98.9  F (37.2  C) (Oral)   Resp 18   Ht 1.651 m (5' 5\")   Wt 70.8 kg (156 lb)   LMP 11/23/2012   SpO2 99%   BMI 25.96 kg/m    JVP<7cm, carotids normal  Lungs clear  Cor RRR no c,r,m  Abs soft +BS, no mass  Ext no c,c,e    Lab Results   Component Value Date    HGB 7.0 (L) 05/07/2025     Lab Results   Component Value Date     05/07/2025     No results found for: \"CREATININE\"  No components found for: \"K\"          Satya Marie MD  Interventional Cardiology   [facility]  [phone]    "

## 2025-05-07 NOTE — PROGRESS NOTES
"Care Management Follow Up    Length of Stay (days): 6    Expected Discharge Date: 05/08/2025    Anticipated Discharge Plan:   TBD, writer will round with pt in the AM to see about TCU.     Transportation: TBD    PT Recommendations: Transitional Care Facility  OT Recommendations:  Transitional Care Facility     Barriers to Discharge: medical stability/ CARDS and GI following    Prior Living Situation:       Pt lives alone at [address] building, with elevator access. Pt Ind with most ADLS, but does get STBA help a couple times a week for showers [facility] [employer]. Pt also had a home RN for med mgmt, and [facility] writer sent facesheet pending to see if pt still open to services. Pt dependent on some other IADLS such as housekeeping, shopping, meal prep, and transport. Pt is on a CADI Waiver and she stated, \"they are working on getting her meals on wheels, and pt already has metro mobility. \"CADI Waiver CM is Audra #[phone]. Pt has also used family for transport and [facility] KIM Hyatt #[phone], has given pt rides to appointments before as well. Pt uses a 4WW in the home, has a manual w/c for longer distance, a commode, and a shower chair. Pt was anticpating home with home care, but now ok with TCU.  Pt may need [facility] w/c transport at discharge time.       Discussed  Partnership in Safe Discharge Planning  document with patient/family: No     Handoff Completed: No, handoff not indicated or clinically appropriate    Patient/Spokesperson Updated: Yes. Who? Pt     Additional Information:   Therapy recs TCU. Pt is agreeable at this time and would prefer [facility] she has been there earlier this year.       Referral was already placed to [facility], and via [employer] they did accept pt, unclear what day pt will be med ready yet. Hosp note states, \"2-4 days anticipated discharge date. \"      Bedside RN stated, \"pt was wanting some equipment for " "home that was never delivered last time she was at [facility].\" Writer spoke to pt and pt stated, \"I am good now, I have gotten a hold of who I need to to get the equipment I need. \" Pt needs a commode and 2 wheel walker. Pt also has CADI CM that she has been in contact with.       Next Steps: Follow up with [facility]. PAS. CM will continue to monitor progression of care, review team recommendations and provide discharge planning assist as needed.       Puja Bell RN      "

## 2025-05-07 NOTE — PROGRESS NOTES
[facility]    Medicine Progress Note - Hospitalist Service    Date of Admission:  5/1/2025    Assessment & Plan   59-year-old female with history of PAD status post right AKA, chronic kidney disease, anxiety, type 2 diabetes, hypertension, C. difficile and known coronary artery disease status post multiple PCI was admitted to ICU after STEMI and found to have severe occlusion of the LAD which was stented with 1 stent to the diagonal and 1 to the LAD.  [facility] [place] consulted for postoperative management.     STEMI  Coronary artery disease  - Status post PCI to prox LAD and Ist diagonal branch on 05/01  - Management per cardiac team.  On aspirin and Brilinta.  Also has severe RCA disease which is nonoperative due to multiple comorbidities and will need to be managed medically.    Acute cardiogenic pulmonary edema  Acute hypoxic respiratory failure  - Off of CPAP. On 3L NC now  - Echo- EF 25-30% with anterior, septal and apical wall akinesis, no LV thrombus  - Not being able to diurese due to hypotension. Cardiology started low dose IV lasix 10mg daily. Changed to PO lasix 20mg daily and added low dose spironolactone 5/6. Monitor response  - 05/07: Cards ordered d-dimer earlier which came back elevated. Discussed with cards- The fact that CXR showing new small b/l pleural effusion and mild diffuse increase in interstitial prominence and pt's sob improving after switching from ticagrelor to plavix makes PE unlikely as the cause of sob. D-dimer is elevated likely 2/2 STEMI and iván. Pt also has severe anemia and worsening Iván which puts patient as elevated risk of renal failure with CTA and bleeding with AC. We can start with US-duplex although VTE is less likely.    Hypotension  -BP dropped to 60s/30s on 5/2. Mild improvement with 250ml fluid bolus and 25gm of albumin.   -Dobutamine gtt and NE gtt started per cardiology. NE gtt topped 5/4; Dobutamine gtt stopped as of 5/5  -Cortisol level- normal    Type  2 diabetes:   - Hold home glipizide and metformin and start diabetic order set, most recent A1c 7.7 1-month ago.  - Patient requests regular diet  - Blood sugars are in 300s. Increased the sliding scale to medium, and add mealtime novolog 1:10    Hypertension:  - Holding home meds-Norvasc, Carvedilol, Lisinopril    History of C. difficile:   - Reports diarrhea PTA.   - Cdiff toxin B by PCR+, Cdiff GDH antigen+, Cdiff toxin-. ID consulted. No need for treatment per ID. Avoid unnecessary antibiotics per ID.     Elevated liver enzymes:   - Likely secondary to the above acute issues, will trend.  - Elevate transaminases with normal alk phos and T bili noted. Patient is s/p lap laurence (many years ago per patient)  - Trend. CT abd/pelvis- moderate fatty infiltration of the liver  - On Repatha at home. Not on statin.    REE on CKD3a  - Creatinine jumped up to 1.47 from 0.9. Patient doesn't have much muscle mass  - REE is likely due to ATN (hypotension) and IV contrast.   - Nephrology s/o 05/05  - Cr slightly going up. 1.37. Monitor closely while on diuretics.    Anemia, acute on chronic  [place]KA  -Hgb of 7.7 noted. No e/o bleeding. CT abd/pelvis- no hematoma  -The drop from 11.0-->9.2-->7.7-->7.2-->7.1. Iron panel reviewed.  -Trend. Transfuse if Hgb <7 or symptomatic. Consent in the chart  - GI consult appreciated:  Iron studies consistent with iron deficiency but likely has component of chronic disease. Recent EGD/Colonoscopy in 06/2022 for iron deficiency anemia ([place]) was negative for sources of bleeding but mentioned chronic peptic type duodenitis.   - Hemolysis labs, blood morphology, Vit B12, and folic acid ordered.     Right RA bruise/hematoma  -Due to arterial puncture during the coronary angiogram  -Symptomatic treatment    COPD  - Not in exacerbation. C/w PTA inhaler Advair Diskus >> Breo Ellipta inpt.    Hypothyroidism: Resumed Levothyroxine  Mood disorder: Depression, anxiety.  Continue home Buspar, Topamax,  "Desyrel, and Seroquel  Peripheral neuropathy: Resumed home meds, is on multiple medications including gabapentin, Voltaren, Robaxin and Lidoderm patch  Peripheral vascular disease status post right AKA          Diet: Snacks/Supplements Adult: Magic Cup; With Meals  Regular Diet Adult    DVT Prophylaxis: Pneumatic Compression Devices  Bear Catheter: Not present  Lines: PRESENT      PICC 05/02/25 Triple Lumen Right Brachial vein medial Vasopressor,access-Site Assessment: WDL      Cardiac Monitoring: ACTIVE order. Indication: Post- PCI/Angiogram (24 hours)  Code Status: Full Code      Clinically Significant Risk Factors         # Hyponatremia: Lowest Na = 131 mmol/L in last 2 days, will monitor as appropriate          # Acute Kidney Injury, unspecified: based on a >150% or 0.3 mg/dL increase in last creatinine compared to past 90 day average, will monitor renal function  # Hypertension: Noted on problem list  # Acute heart failure with reduced ejection fraction: last echo with EF <40% and receiving IV diuretics          # DMII: A1C = 7.7 % (Ref range: <5.7 %) within past 6 months   # Overweight: Estimated body mass index is 25.96 kg/m  as calculated from the following:    Height as of this encounter: 1.651 m (5' 5\").    Weight as of this encounter: 70.8 kg (156 lb).      # Asthma: noted on problem list        Social Drivers of Health    Depression: At risk (11/29/2024)    Received from [facility]    PHQ-2     PHQ-2 TOTAL SCORE: 4   Housing Stability: High Risk (5/5/2025)    Housing Stability     Do you have housing? : Yes     Are you worried about losing your housing?: Yes   Tobacco Use: High Risk (5/2/2025)    Patient History     Smoking Tobacco Use: Every Day     Smokeless Tobacco Use: Never   Physical Activity: Inactive (3/3/2023)    Received from [facility]    Exercise Vital Sign     Days of Exercise per Week: 0 days     Minutes of Exercise per Session: 0 min   Stress: Stress " Concern Present (3/3/2023)    Received from [facility] of Occupational Health - Occupational Stress Questionnaire     Feeling of Stress : To some extent   Social Connections: Socially Integrated (12/20/2024)    Received from [facility]    Social Connections     Do you often feel lonely or isolated from those around you?: 0   Recent Concern: Social Connections - Socially Isolated (11/29/2024)    Received from [facility]    Social Connections     Do you often feel lonely or isolated from those around you?: 4          Disposition Plan     Medically Ready for Discharge: Anticipated in 2-4 Days             Ana Laura Bullock MD  Hospitalist Service  [facility]  Securely message with [employer] (more info)  Text page via [employer] Paging/Directory   ______________________________________________________________________    Interval History   Patient is new to me today. Chart reviewed.  Patient is seen and examined at bedside.   Denied any bleeding. No numbness over right hand. Some diarrhea.   Plan of care discussed with patient. All questions answered. Pt verbalized understanding.     Physical Exam   Vital Signs: Temp: 97.6  F (36.4  C) Temp src: Oral BP: 97/59 Pulse: 82   Resp: 16 SpO2: 98 % O2 Device: Nasal cannula Oxygen Delivery: 3 LPM  Weight: 156 lbs 0 oz    GEN: Alert and oriented. Not in acute distress.  HEENT: Atraumatic, mucous membrane- moist and pink.  Chest: Bilateral air entry.  CVS: S1S2 regular.   Abdomen: Soft. Non-tender, non-distended. No organomegaly. No guarding or rigidity. Bowel sounds active.   Extremities: No pedal edema. Right AKA. Right forearm swelling and bruise  CNS: No involuntary movements.  Skin: no cyanosis or clubbing.     Medical Decision Making       56 MINUTES SPENT BY ME on the date of service doing chart review, history, exam, documentation & further activities per the note.       Data

## 2025-05-08 ENCOUNTER — APPOINTMENT (OUTPATIENT)
Dept: ULTRASOUND IMAGING | Facility: HOSPITAL | Age: 60
End: 2025-05-08
Attending: STUDENT IN AN ORGANIZED HEALTH CARE EDUCATION/TRAINING PROGRAM
Payer: COMMERCIAL

## 2025-05-08 ENCOUNTER — ANESTHESIA (OUTPATIENT)
Dept: SURGERY | Facility: HOSPITAL | Age: 60
End: 2025-05-08
Payer: COMMERCIAL

## 2025-05-08 ENCOUNTER — ANESTHESIA EVENT (OUTPATIENT)
Dept: SURGERY | Facility: HOSPITAL | Age: 60
End: 2025-05-08
Payer: COMMERCIAL

## 2025-05-08 VITALS
SYSTOLIC BLOOD PRESSURE: 101 MMHG | TEMPERATURE: 98.3 F | DIASTOLIC BLOOD PRESSURE: 58 MMHG | HEART RATE: 80 BPM | WEIGHT: 160.05 LBS | OXYGEN SATURATION: 96 % | BODY MASS INDEX: 26.67 KG/M2 | HEIGHT: 65 IN | RESPIRATION RATE: 20 BRPM

## 2025-05-08 LAB
ABO + RH BLD: NORMAL
ABO + RH BLD: NORMAL
ANION GAP SERPL CALCULATED.3IONS-SCNC: 12 MMOL/L (ref 7–15)
BASOPHILS # BLD AUTO: 0.1 10E3/UL (ref 0–0.2)
BASOPHILS NFR BLD AUTO: 1 %
BLD GP AB SCN SERPL QL: NEGATIVE
BLD PROD TYP BPU: NORMAL
BLOOD COMPONENT TYPE: NORMAL
BUN SERPL-MCNC: 35.8 MG/DL (ref 8–23)
CALCIUM SERPL-MCNC: 8.2 MG/DL (ref 8.8–10.4)
CHLORIDE SERPL-SCNC: 98 MMOL/L (ref 98–107)
CODING SYSTEM: NORMAL
CREAT SERPL-MCNC: 1.42 MG/DL (ref 0.51–0.95)
CROSSMATCH: NORMAL
EGFRCR SERPLBLD CKD-EPI 2021: 42 ML/MIN/1.73M2
EOSINOPHIL # BLD AUTO: 0.1 10E3/UL (ref 0–0.7)
EOSINOPHIL NFR BLD AUTO: 1 %
ERYTHROCYTE [DISTWIDTH] IN BLOOD BY AUTOMATED COUNT: 16.3 % (ref 10–15)
GLUCOSE BLDC GLUCOMTR-MCNC: 133 MG/DL (ref 70–99)
GLUCOSE BLDC GLUCOMTR-MCNC: 139 MG/DL (ref 70–99)
GLUCOSE BLDC GLUCOMTR-MCNC: 155 MG/DL (ref 70–99)
GLUCOSE BLDC GLUCOMTR-MCNC: 169 MG/DL (ref 70–99)
GLUCOSE BLDC GLUCOMTR-MCNC: 240 MG/DL (ref 70–99)
GLUCOSE BLDC GLUCOMTR-MCNC: 259 MG/DL (ref 70–99)
GLUCOSE SERPL-MCNC: 142 MG/DL (ref 70–99)
HCO3 SERPL-SCNC: 22 MMOL/L (ref 22–29)
HCT VFR BLD AUTO: 22.8 % (ref 35–47)
HGB BLD-MCNC: 6.8 G/DL (ref 11.7–15.7)
HGB BLD-MCNC: 7.9 G/DL (ref 11.7–15.7)
IMM GRANULOCYTES # BLD: 0.1 10E3/UL
IMM GRANULOCYTES NFR BLD: 1 %
ISSUE DATE AND TIME: NORMAL
LYMPHOCYTES # BLD AUTO: 0.9 10E3/UL (ref 0.8–5.3)
LYMPHOCYTES NFR BLD AUTO: 9 %
MAGNESIUM SERPL-MCNC: 1.8 MG/DL (ref 1.7–2.3)
MCH RBC QN AUTO: 22.7 PG (ref 26.5–33)
MCHC RBC AUTO-ENTMCNC: 29.8 G/DL (ref 31.5–36.5)
MCV RBC AUTO: 76 FL (ref 78–100)
MONOCYTES # BLD AUTO: 0.8 10E3/UL (ref 0–1.3)
MONOCYTES NFR BLD AUTO: 8 %
NEUTROPHILS # BLD AUTO: 8.2 10E3/UL (ref 1.6–8.3)
NEUTROPHILS NFR BLD AUTO: 80 %
NRBC # BLD AUTO: 0 10E3/UL
NRBC BLD AUTO-RTO: 0 /100
PLATELET # BLD AUTO: 329 10E3/UL (ref 150–450)
POTASSIUM SERPL-SCNC: 4.5 MMOL/L (ref 3.4–5.3)
RBC # BLD AUTO: 2.99 10E6/UL (ref 3.8–5.2)
SODIUM SERPL-SCNC: 132 MMOL/L (ref 135–145)
SPECIMEN EXP DATE BLD: NORMAL
SPECIMEN EXP DATE BLD: NORMAL
UNIT ABO/RH: NORMAL
UNIT NUMBER: NORMAL
UNIT STATUS: NORMAL
UNIT TYPE ISBT: 5100
UPPER GI ENDOSCOPY: NORMAL
WBC # BLD AUTO: 10.2 10E3/UL (ref 4–11)

## 2025-05-08 PROCEDURE — 250N000009 HC RX 250: Performed by: INTERNAL MEDICINE

## 2025-05-08 PROCEDURE — 250N000013 HC RX MED GY IP 250 OP 250 PS 637: Performed by: INTERNAL MEDICINE

## 2025-05-08 PROCEDURE — 0DB58ZX EXCISION OF ESOPHAGUS, VIA NATURAL OR ARTIFICIAL OPENING ENDOSCOPIC, DIAGNOSTIC: ICD-10-PCS | Performed by: INTERNAL MEDICINE

## 2025-05-08 PROCEDURE — 85018 HEMOGLOBIN: CPT | Performed by: STUDENT IN AN ORGANIZED HEALTH CARE EDUCATION/TRAINING PROGRAM

## 2025-05-08 PROCEDURE — 82565 ASSAY OF CREATININE: CPT | Performed by: INTERNAL MEDICINE

## 2025-05-08 PROCEDURE — 88342 IMHCHEM/IMCYTCHM 1ST ANTB: CPT | Mod: 26 | Performed by: PATHOLOGY

## 2025-05-08 PROCEDURE — 258N000003 HC RX IP 258 OP 636: Performed by: ANESTHESIOLOGY

## 2025-05-08 PROCEDURE — 88342 IMHCHEM/IMCYTCHM 1ST ANTB: CPT | Mod: TC | Performed by: INTERNAL MEDICINE

## 2025-05-08 PROCEDURE — 99233 SBSQ HOSP IP/OBS HIGH 50: CPT | Performed by: STUDENT IN AN ORGANIZED HEALTH CARE EDUCATION/TRAINING PROGRAM

## 2025-05-08 PROCEDURE — 99232 SBSQ HOSP IP/OBS MODERATE 35: CPT | Performed by: INTERNAL MEDICINE

## 2025-05-08 PROCEDURE — 999N000141 HC STATISTIC PRE-PROCEDURE NURSING ASSESSMENT: Performed by: INTERNAL MEDICINE

## 2025-05-08 PROCEDURE — 94640 AIRWAY INHALATION TREATMENT: CPT

## 2025-05-08 PROCEDURE — 0DB98ZX EXCISION OF DUODENUM, VIA NATURAL OR ARTIFICIAL OPENING ENDOSCOPIC, DIAGNOSTIC: ICD-10-PCS | Performed by: INTERNAL MEDICINE

## 2025-05-08 PROCEDURE — 250N000011 HC RX IP 250 OP 636: Performed by: NURSE ANESTHETIST, CERTIFIED REGISTERED

## 2025-05-08 PROCEDURE — 85025 COMPLETE CBC W/AUTO DIFF WBC: CPT | Performed by: STUDENT IN AN ORGANIZED HEALTH CARE EDUCATION/TRAINING PROGRAM

## 2025-05-08 PROCEDURE — 999N000157 HC STATISTIC RCP TIME EA 10 MIN

## 2025-05-08 PROCEDURE — 86901 BLOOD TYPING SEROLOGIC RH(D): CPT | Performed by: HOSPITALIST

## 2025-05-08 PROCEDURE — P9016 RBC LEUKOCYTES REDUCED: HCPCS | Performed by: HOSPITALIST

## 2025-05-08 PROCEDURE — 93971 EXTREMITY STUDY: CPT | Mod: LT

## 2025-05-08 PROCEDURE — 272N000001 HC OR GENERAL SUPPLY STERILE: Performed by: INTERNAL MEDICINE

## 2025-05-08 PROCEDURE — 370N000017 HC ANESTHESIA TECHNICAL FEE, PER MIN: Performed by: INTERNAL MEDICINE

## 2025-05-08 PROCEDURE — 250N000013 HC RX MED GY IP 250 OP 250 PS 637: Performed by: EMERGENCY MEDICINE

## 2025-05-08 PROCEDURE — B240ZZ3 ULTRASONOGRAPHY OF SINGLE CORONARY ARTERY, INTRAVASCULAR: ICD-10-PCS | Performed by: STUDENT IN AN ORGANIZED HEALTH CARE EDUCATION/TRAINING PROGRAM

## 2025-05-08 PROCEDURE — 258N000003 HC RX IP 258 OP 636: Performed by: NURSE ANESTHETIST, CERTIFIED REGISTERED

## 2025-05-08 PROCEDURE — 0DB68ZX EXCISION OF STOMACH, VIA NATURAL OR ARTIFICIAL OPENING ENDOSCOPIC, DIAGNOSTIC: ICD-10-PCS | Performed by: INTERNAL MEDICINE

## 2025-05-08 PROCEDURE — 88305 TISSUE EXAM BY PATHOLOGIST: CPT | Mod: 26 | Performed by: PATHOLOGY

## 2025-05-08 PROCEDURE — 86923 COMPATIBILITY TEST ELECTRIC: CPT | Performed by: HOSPITALIST

## 2025-05-08 PROCEDURE — 360N000075 HC SURGERY LEVEL 2, PER MIN: Performed by: INTERNAL MEDICINE

## 2025-05-08 PROCEDURE — 83735 ASSAY OF MAGNESIUM: CPT | Performed by: STUDENT IN AN ORGANIZED HEALTH CARE EDUCATION/TRAINING PROGRAM

## 2025-05-08 PROCEDURE — 250N000009 HC RX 250: Performed by: NURSE ANESTHETIST, CERTIFIED REGISTERED

## 2025-05-08 PROCEDURE — 120N000004 HC R&B MS OVERFLOW

## 2025-05-08 RX ORDER — ONDANSETRON 2 MG/ML
4 INJECTION INTRAMUSCULAR; INTRAVENOUS EVERY 30 MIN PRN
Status: DISCONTINUED | OUTPATIENT
Start: 2025-05-08 | End: 2025-05-08

## 2025-05-08 RX ORDER — DEXAMETHASONE SODIUM PHOSPHATE 4 MG/ML
4 INJECTION, SOLUTION INTRA-ARTICULAR; INTRALESIONAL; INTRAMUSCULAR; INTRAVENOUS; SOFT TISSUE
Status: DISCONTINUED | OUTPATIENT
Start: 2025-05-08 | End: 2025-05-08

## 2025-05-08 RX ORDER — SODIUM CHLORIDE, SODIUM LACTATE, POTASSIUM CHLORIDE, CALCIUM CHLORIDE 600; 310; 30; 20 MG/100ML; MG/100ML; MG/100ML; MG/100ML
INJECTION, SOLUTION INTRAVENOUS CONTINUOUS PRN
Status: DISCONTINUED | OUTPATIENT
Start: 2025-05-08 | End: 2025-05-08

## 2025-05-08 RX ORDER — LIDOCAINE HYDROCHLORIDE 20 MG/ML
INJECTION, SOLUTION INFILTRATION; PERINEURAL PRN
Status: DISCONTINUED | OUTPATIENT
Start: 2025-05-08 | End: 2025-05-08

## 2025-05-08 RX ORDER — SODIUM CHLORIDE, SODIUM LACTATE, POTASSIUM CHLORIDE, CALCIUM CHLORIDE 600; 310; 30; 20 MG/100ML; MG/100ML; MG/100ML; MG/100ML
INJECTION, SOLUTION INTRAVENOUS CONTINUOUS
Status: DISCONTINUED | OUTPATIENT
Start: 2025-05-08 | End: 2025-05-08

## 2025-05-08 RX ORDER — NALOXONE HYDROCHLORIDE 1 MG/ML
0.1 INJECTION INTRAMUSCULAR; INTRAVENOUS; SUBCUTANEOUS
Status: DISCONTINUED | OUTPATIENT
Start: 2025-05-08 | End: 2025-05-08

## 2025-05-08 RX ORDER — PROPOFOL 10 MG/ML
INJECTION, EMULSION INTRAVENOUS PRN
Status: DISCONTINUED | OUTPATIENT
Start: 2025-05-08 | End: 2025-05-08

## 2025-05-08 RX ORDER — ONDANSETRON 4 MG/1
4 TABLET, ORALLY DISINTEGRATING ORAL EVERY 30 MIN PRN
Status: DISCONTINUED | OUTPATIENT
Start: 2025-05-08 | End: 2025-05-08

## 2025-05-08 RX ORDER — CLOPIDOGREL BISULFATE 75 MG/1
300 TABLET ORAL ONCE
Status: COMPLETED | OUTPATIENT
Start: 2025-05-08 | End: 2025-05-08

## 2025-05-08 RX ADMIN — PROPOFOL 30 MG: 10 INJECTION, EMULSION INTRAVENOUS at 11:57

## 2025-05-08 RX ADMIN — BUPROPION HYDROCHLORIDE 300 MG: 300 TABLET, EXTENDED RELEASE ORAL at 13:18

## 2025-05-08 RX ADMIN — TRAZODONE HYDROCHLORIDE 50 MG: 50 TABLET ORAL at 21:25

## 2025-05-08 RX ADMIN — PANTOPRAZOLE SODIUM 40 MG: 40 TABLET, DELAYED RELEASE ORAL at 06:04

## 2025-05-08 RX ADMIN — METOPROLOL SUCCINATE 12.5 MG: 25 TABLET, EXTENDED RELEASE ORAL at 21:20

## 2025-05-08 RX ADMIN — BUDESONIDE 0.5 MG: 0.5 INHALANT ORAL at 08:16

## 2025-05-08 RX ADMIN — INSULIN ASPART 6 UNITS: 100 INJECTION, SOLUTION INTRAVENOUS; SUBCUTANEOUS at 18:42

## 2025-05-08 RX ADMIN — PROPOFOL 40 MG: 10 INJECTION, EMULSION INTRAVENOUS at 11:54

## 2025-05-08 RX ADMIN — METOPROLOL SUCCINATE 12.5 MG: 25 TABLET, EXTENDED RELEASE ORAL at 10:00

## 2025-05-08 RX ADMIN — METHOCARBAMOL 500 MG: 500 TABLET ORAL at 21:37

## 2025-05-08 RX ADMIN — GUAIFENESIN AND DEXTROMETHORPHAN 10 ML: 100; 10 SYRUP ORAL at 21:24

## 2025-05-08 RX ADMIN — SODIUM CHLORIDE, SODIUM LACTATE, POTASSIUM CHLORIDE, AND CALCIUM CHLORIDE: .6; .31; .03; .02 INJECTION, SOLUTION INTRAVENOUS at 12:38

## 2025-05-08 RX ADMIN — VALACYCLOVIR HYDROCHLORIDE 500 MG: 500 TABLET, FILM COATED ORAL at 14:35

## 2025-05-08 RX ADMIN — BUSPIRONE HYDROCHLORIDE 5 MG: 5 TABLET ORAL at 14:35

## 2025-05-08 RX ADMIN — SODIUM CHLORIDE, SODIUM LACTATE, POTASSIUM CHLORIDE, AND CALCIUM CHLORIDE: .6; .31; .03; .02 INJECTION, SOLUTION INTRAVENOUS at 11:50

## 2025-05-08 RX ADMIN — IPRATROPIUM BROMIDE AND ALBUTEROL SULFATE 3 ML: .5; 3 SOLUTION RESPIRATORY (INHALATION) at 21:46

## 2025-05-08 RX ADMIN — FLUTICASONE FUROATE AND VILANTEROL TRIFENATATE 1 PUFF: 200; 25 POWDER RESPIRATORY (INHALATION) at 08:09

## 2025-05-08 RX ADMIN — OXYCODONE HYDROCHLORIDE 5 MG: 5 TABLET ORAL at 21:37

## 2025-05-08 RX ADMIN — QUETIAPINE FUMARATE 25 MG: 25 TABLET ORAL at 21:20

## 2025-05-08 RX ADMIN — Medication 10 MG: at 21:30

## 2025-05-08 RX ADMIN — CLOPIDOGREL 300 MG: 75 TABLET ORAL at 21:22

## 2025-05-08 RX ADMIN — GABAPENTIN 300 MG: 300 CAPSULE ORAL at 21:20

## 2025-05-08 RX ADMIN — LEVOTHYROXINE SODIUM 125 MCG: 0.12 TABLET ORAL at 06:04

## 2025-05-08 RX ADMIN — BUSPIRONE HYDROCHLORIDE 5 MG: 5 TABLET ORAL at 21:22

## 2025-05-08 RX ADMIN — GABAPENTIN 300 MG: 300 CAPSULE ORAL at 13:19

## 2025-05-08 RX ADMIN — INSULIN ASPART 2 UNITS: 100 INJECTION, SOLUTION INTRAVENOUS; SUBCUTANEOUS at 21:34

## 2025-05-08 RX ADMIN — FORMOTEROL FUMARATE DIHYDRATE 20 MCG: 20 SOLUTION RESPIRATORY (INHALATION) at 08:16

## 2025-05-08 RX ADMIN — PROPOFOL 70 MG: 10 INJECTION, EMULSION INTRAVENOUS at 11:50

## 2025-05-08 RX ADMIN — VENLAFAXINE HYDROCHLORIDE 75 MG: 75 CAPSULE, EXTENDED RELEASE ORAL at 14:35

## 2025-05-08 RX ADMIN — LIDOCAINE HYDROCHLORIDE 3 ML: 20 INJECTION, SOLUTION INFILTRATION; PERINEURAL at 11:50

## 2025-05-08 ASSESSMENT — ACTIVITIES OF DAILY LIVING (ADL)
ADLS_ACUITY_SCORE: 64
ADLS_ACUITY_SCORE: 64
ADLS_ACUITY_SCORE: 65
ADLS_ACUITY_SCORE: 64
ADLS_ACUITY_SCORE: 65
ADLS_ACUITY_SCORE: 65
ADLS_ACUITY_SCORE: 64
ADLS_ACUITY_SCORE: 65
ADLS_ACUITY_SCORE: 64
ADLS_ACUITY_SCORE: 65
ADLS_ACUITY_SCORE: 64
ADLS_ACUITY_SCORE: 65
ADLS_ACUITY_SCORE: 65
ADLS_ACUITY_SCORE: 64
ADLS_ACUITY_SCORE: 64
ADLS_ACUITY_SCORE: 65

## 2025-05-08 ASSESSMENT — COPD QUESTIONNAIRES: COPD: 1

## 2025-05-08 NOTE — PROGRESS NOTES
[facility]    Medicine Progress Note - Hospitalist Service    Date of Admission:  5/1/2025    Assessment & Plan   59-year-old female with history of PAD status post right AKA, chronic kidney disease, anxiety, type 2 diabetes, hypertension, C. difficile and known coronary artery disease status post multiple PCI was admitted to ICU after STEMI and found to have severe occlusion of the LAD which was stented with 1 stent to the diagonal and 1 to the LAD.  [facility] [place].     STEMI  Coronary artery disease  - Status post PCI to prox LAD and Ist diagonal branch on 05/01  - Management per cardiac team.  Also has severe RCA disease which is nonoperative due to multiple comorbidities and will need to be managed medically.  - S/p Brilinta and ASA. Currently on Plavix    Acute cardiogenic pulmonary edema  Acute hypoxic respiratory failure  - Off of CPAP. On 3L NC now  - Echo- EF 25-30% with anterior, septal and apical wall akinesis, no LV thrombus  - 05/07: Cards ordered d-dimer earlier which came back elevated. Discussed with cards- The fact that CXR showing new small b/l pleural effusion and mild diffuse increase in interstitial prominence and pt's sob improving after switching from ticagrelor to plavix makes PE unlikely as the cause of sob. D-dimer is elevated likely 2/2 STEMI and iván. Pt also has severe anemia and worsening Iván which puts patient as elevated risk of renal failure with CTA and bleeding with AC. We can start with US-duplex although VTE is less likely.  -- 05/08: US-duplex b/l: No dvt. Lasix and spironolactone held due to worsening of IVÁN. Patient declined 2 gm Na and low cholesterol diet.     Hypotension  -BP dropped to 60s/30s on 5/2. Mild improvement with 250ml fluid bolus and 25gm of albumin.   -Dobutamine gtt and NE gtt started per cardiology. NE gtt topped 5/4; Dobutamine gtt stopped as of 5/5  -Cortisol level- normal    Type 2 diabetes:   - Hold home  glipizide and metformin and start diabetic order set, most recent A1c 7.7 1-month ago.  - Patient requests regular diet  - Blood sugars are in 300s. Increased the sliding scale to medium, and add mealtime novolog 1:10    Hypertension:  - Holding home meds-Norvasc, Carvedilol, Lisinopril    History of C. difficile:   - Reports diarrhea PTA.   - Cdiff toxin B by PCR+, Cdiff GDH antigen+, Cdiff toxin-. ID consulted. No need for treatment per ID. Avoid unnecessary antibiotics per ID.     Elevated liver enzymes:   - Likely secondary to the above acute issues, will trend.  - Elevate transaminases with normal alk phos and T bili noted. Patient is s/p lap laurence (many years ago per patient)  - Trend. CT abd/pelvis- moderate fatty infiltration of the liver  - On Repatha at home. Not on statin.    REE on CKD3a  - Creatinine jumped up to 1.47 from 0.9. Patient doesn't have much muscle mass  - REE is likely due to ATN (hypotension) and IV contrast.   - Nephrology s/o 05/05  - 05/08: Cr going up. 1.42. Monitor closely while on diuretics. ARB held.  - nephrology consult appreciated:Hold diuretics as creatinine now rising     Anemia, acute on chronic  [place]  -Hgb of 7.7 noted. No e/o bleeding. CT abd/pelvis- no hematoma  -The drop from 11.0-->9.2-->7.7-->7.2-->7.1. Iron panel reviewed.  -Trend. Transfuse if Hgb <7 or symptomatic. Consent in the chart  - GI consult appreciated:  Iron studies consistent with iron deficiency but likely has component of chronic disease. Recent EGD/Colonoscopy in 06/2022 for iron deficiency anemia ([place]) was negative for sources of bleeding but mentioned chronic peptic type duodenitis.   - Hemolysis labs, blood morphology, Vit B12, and folic acid ordered.   - 05/08: S/p EGD:  - Esophageal plaques were found, suspicious for  candidiasis. Biopsied.  - LA Grade A reflux esophagitis with no bleeding. - Congestive gastropathy. Biopsied. Recommend pantoprazole 40mg daily x 1 month for the reflux esophagitis.  "  - S/p 1 unit PRBC    Right RA bruise/hematoma  -Due to arterial puncture during the coronary angiogram  -Symptomatic treatment    COPD  - Not in exacerbation. C/w PTA inhaler Advair Diskus >> Breo Ellipta inpt.    Hypothyroidism: Resumed Levothyroxine  Mood disorder: Depression, anxiety.  Continue home Buspar, Topamax, Desyrel, and Seroquel  Peripheral neuropathy: Resumed home meds, is on multiple medications including gabapentin, Voltaren, Robaxin and Lidoderm patch  Peripheral vascular disease status post right AKA    PT/OT: Recommended TCU          Diet: Snacks/Supplements Adult: Magic Cup; With Meals  Combination Diet Moderate Consistent Carb (60 g CHO per Meal) Diet    DVT Prophylaxis: Pneumatic Compression Devices  Bear Catheter: Not present  Lines: PRESENT      PICC 05/02/25 Triple Lumen Right Brachial vein medial Vasopressor,access-Site Assessment: WDL      Cardiac Monitoring: None  Code Status: Full Code      Clinically Significant Risk Factors         # Hyponatremia: Lowest Na = 131 mmol/L in last 2 days, will monitor as appropriate             # Hypertension: Noted on problem list  # Chronic heart failure with reduced ejection fraction: last echo with EF <40%          # DMII: A1C = 7.7 % (Ref range: <5.7 %) within past 6 months   # Overweight: Estimated body mass index is 26.63 kg/m  as calculated from the following:    Height as of this encounter: 1.651 m (5' 5\").    Weight as of this encounter: 72.6 kg (160 lb 0.9 oz).      # Asthma: noted on problem list        Social Drivers of Health    Depression: At risk (11/29/2024)    Received from [facility]    PHQ-2     PHQ-2 TOTAL SCORE: 4   Housing Stability: High Risk (5/5/2025)    Housing Stability     Do you have housing? : Yes     Are you worried about losing your housing?: Yes   Tobacco Use: High Risk (5/8/2025)    Patient History     Smoking Tobacco Use: Every Day     Smokeless Tobacco Use: Never   Physical Activity: " Inactive (3/3/2023)    Received from [facility]    Exercise Vital Sign     Days of Exercise per Week: 0 days     Minutes of Exercise per Session: 0 min   Stress: Stress Concern Present (3/3/2023)    Received from [facility]    [place] of Occupational Health - Occupational Stress Questionnaire     Feeling of Stress : To some extent   Social Connections: Socially Integrated (12/20/2024)    [facility]    Social Connections     Do you often feel lonely or isolated from those around you?: 0   Recent Concern: Social Connections - Socially Isolated (11/29/2024)    Received from [place]    Social Connections     Do you often feel lonely or isolated from those around you?: 4          Disposition Plan     Medically Ready for Discharge: Anticipated in 2-4 Days             Ana Laura Bullock MD  Hospitalist Service  [facility]  Securely message with [employer] (more info)  Text page via [employer] Paging/Directory   ______________________________________________________________________    Interval History   Patient is seen and examined at bedside.   Declined diet restriction- sodium and low fat. Moderate CHO ordered.  Plan of care discussed with patient. All questions answered. Pt verbalized understanding.     Physical Exam   Vital Signs: Temp: 98.2  F (36.8  C) Temp src: Oral BP: 101/62 Pulse: 80   Resp: 20 SpO2: 98 % O2 Device: Nasal cannula Oxygen Delivery: 2 LPM  Weight: 160 lbs .86 oz    GEN: Alert and oriented. Not in acute distress.  HEENT: Atraumatic, mucous membrane- moist and pink.  Chest: Bilateral air entry.  CVS: S1S2 regular.   Abdomen: Soft. Non-tender, non-distended. No organomegaly. No guarding or rigidity. Bowel sounds active.   Extremities: No pedal edema. Right AKA. Right forearm swelling and bruise  CNS: No involuntary movements.  Skin: no cyanosis or clubbing.     Medical Decision Making        54 MINUTES SPENT BY ME on the date of service doing chart review, history, exam, documentation & further activities per the note.      Data
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
## 2025-05-08 NOTE — ANESTHESIA POSTPROCEDURE EVALUATION
Patient: Gloria Penn    Procedure: Procedure(s):  ESOPHAGOGASTRODUODENOSCOPY, BIOPSIES OF DUODENUM, GASTRIC AND ESOPHAGUS.       Anesthesia Type:  MAC    Note:  Disposition: Inpatient   Postop Pain Control: Uneventful            Sign Out: Well controlled pain   PONV: No   Neuro/Psych: Uneventful            Sign Out: Acceptable/Baseline neuro status   Airway/Respiratory: Uneventful            Sign Out: Acceptable/Baseline resp. status   CV/Hemodynamics: Uneventful            Sign Out: Acceptable CV status; No obvious hypovolemia; No obvious fluid overload   Other NRE: NONE   DID A NON-ROUTINE EVENT OCCUR? No           Last vitals:  Vitals:    05/08/25 1030 05/08/25 1045 05/08/25 1105   BP: 112/62 109/78 109/78   Pulse: 81 85 85   Resp: 18  18   Temp:   36.4  C (97.5  F)   SpO2: 92%  98%       Electronically Signed By: Chris Chambers MD  May 8, 2025  2:36 PM

## 2025-05-08 NOTE — PROGRESS NOTES
CLINICAL NUTRITION SERVICES    Reviewed nutrition risk factors due to LOS. No concerns with oral intake per pt/RN/[employer] (room service meal ordering system). Reviewed wt hx. No unintentional wt loss. No indication of pressure injury.    Follow Up / Monitoring:   Per policy unless consulted

## 2025-05-08 NOTE — PLAN OF CARE
[facility] - ICU    RN Progress Note:    Pt is alert, oriented x 4, and able to make needs known. Vital signs stable. Room available on Unit P3. Report called to receiving RN. Pt transferred off of ICU for destination. Medications, belongings sent with Pt. Zac Sigala RN          Pertinent Assessments:      Please refer to flowsheet rows for full assessment     Vital signs.            Key Events - This Shift:       Transfer to Unit P3.      RN Managed Protocols Ordered:  Yes  Protocols:Potassium and Magnesium  Protocols Status: Reviewed with Oncoming RN                Barriers to Discharge / Downgrade:     ***         Point of Contact Update: YES-OR-NO: Yes  If No, reason: ***  Name:***  Phone Number:***  Summary of Conversation: ***

## 2025-05-08 NOTE — PLAN OF CARE
"  Problem: Adult Inpatient Plan of Care  Goal: Plan of Care Review  Description: The Plan of Care Review/Shift note should be completed every shift.  The Outcome Evaluation is a brief statement about your assessment that the patient is improving, declining, or no change.  This information will be displayed automatically on your shiftnote.  Outcome: Adequate for Care Transition  Flowsheets (Taken 5/8/2025 1751)  Plan of Care Reviewed With: patient  Goal: Patient-Specific Goal (Individualized)  Description: You can add care plan individualizations to a care plan. Examples of Individualization might be:  \"Parent requests to be called daily at 9am for status\", \"I have a hard time hearing out of my right ear\", or \"Do not touch me to wake me up as it startlesme\".  Outcome: Adequate for Care Transition  Goal: Absence of Hospital-Acquired Illness or Injury  Outcome: Adequate for Care Transition  Intervention: Identify and Manage Fall Risk  Recent Flowsheet Documentation  Taken 5/8/2025 0810 by Tiki Ward, RN  Safety Promotion/Fall Prevention:   activity supervised   assistive device/personal items within reach   patient and family education   room door open   room near nurse's station   safety round/check completed   toileting scheduled   clutter free environment maintained   increased rounding and observation   increase visualization of patient   lighting adjusted   mobility aid in reach   nonskid shoes/slippers when out of bed   room organization consistent  Intervention: Prevent Skin Injury  Recent Flowsheet Documentation  Taken 5/8/2025 1654 by Tiki Ward, RN  Body Position: refuses positioning  Taken 5/8/2025 1538 by Tiki Ward, RN  Body Position: position changed independently  Taken 5/8/2025 1254 by Tiki Ward, RN  Body Position:   left   turned  Taken 5/8/2025 0810 by Tiki Ward, RN  Body Position: position changed independently  Intervention: Prevent Infection  Recent Flowsheet " Documentation  Taken 5/8/2025 0810 by Tiki Ward, RN  Infection Prevention:   rest/sleep promoted   hand hygiene promoted   cohorting utilized  Goal: Optimal Comfort and Wellbeing  Outcome: Adequate for Care Transition  Intervention: Provide Person-Centered Care  Recent Flowsheet Documentation  Taken 5/8/2025 0810 by Tiki Ward, RN  Trust Relationship/Rapport:   care explained   choices provided   thoughts/feelings acknowledged  Goal: Readiness for Transition of Care  Outcome: Adequate for Care Transition     Problem: Glycemic Control Impaired  Goal: Blood Glucose Level Within Targeted Range  Outcome: Progressing  Goal: Minimize Risk of Hypoglycemia  Outcome: Progressing   Goal Outcome Evaluation:      Plan of Care Reviewed With: patient  Patient back from EGD this afternoon. Denies pain. Assist of 1 to BSC. Still need Urine sample. Patient upset about being on Diabetic diet, wants to be on regular diet. Patient had multiple cranberry juices in her bag. Reminded patient that we need to know what she is eating an drinking because we are giving her insulin. Patient on 1-2 L 95 %. Sat 87-88 % on RA. NSR TELE. No BM today. Given 1 unit PRBC this AM.

## 2025-05-08 NOTE — PROGRESS NOTES
"  '    [facility] ([facility]) progress note  CC: F/U REE  S: Reconsulted on patient, now with rising creatinine in setting of CHF, aggressive diuresis, hypotension to 80s-90s(lower from 100s-120s 4-5d ago). Just had EGD earlier today and turns out she has thrush and no active bleeding, per RN report.    A/P:   REE: Creatinine of 1.47 up from 0.99 on 5/2. Patient with recent angio and 2 stent placement(5/1)also with significant hypotension on 5/2. In the setting of CHF and ongoing diuresis.   Creatinine rising in past few days with lower Bps, ongoing diuretics.   Hold diuretics at this time  Daily renal function labs, wts, I/Os     2. CAD: w/ STEMI s/p angio and stent placement. Per cardiology.     3. HFpEF: setting of acute STEMI,    -Hold diuretics as creatinine now rising. Has been NPO for GI procedure.    4. Type 2 DM: per primary.     5. HTN: Bps lower recently. On metoprolol. Per cards.       Jerzy Hanna MD  [facility]  [phone]         No interval changes to past medical history, social history or family history to report.    /67   Pulse 82   Temp 98.1  F (36.7  C) (Oral)   Resp 18   Ht 1.651 m (5' 5\")   Wt 72.6 kg (160 lb 0.9 oz)   LMP 11/23/2012   SpO2 93%   BMI 26.63 kg/m      I/O last 3 completed shifts:  In: 430 [P.O.:400; I.V.:30]  Out: 300 [Urine:300]    Physical Exam:   GENERAL: calm and comfortable  EYES: No scleral icterus, conjunctiva clear  ENT: Hearing normal, MMM  RESP: anterior clear.   CV: RRR, No leg edema.    GI: Soft, NT  Musculoskeletal: right leg AKA  SKIN: No rash,  PSYCH:  Appropriate mood and affect    Recent Labs   Lab 05/08/25  0602 05/07/25  0614 05/06/25  0416 05/05/25  0420 05/04/25  0415 05/03/25  0413 05/02/25  1220 05/02/25  0420 05/01/25  1615   * 131* 131* 135 134* 133*  --  139 137   POTASSIUM 4.5 4.5 4.7 4.4 4.2 3.5 3.9 3.2* 3.8   CHLORIDE 98 98 99 103 100 99  --  103 99   CO2 22 23 23 25 21* 23  --  23 23   BUN 35.8* 29.0* 24.6* 19.6 26.6* 27.3*  " --  17.2 18.1   CR 1.42* 1.37* 1.32* 0.92 1.10* 1.47*  --  0.99* 0.95   GFRESTIMATED 42* 44* 46* 71 58* 41*  --  65 69   LESLY 8.2* 8.3* 8.2* 8.1* 8.4* 8.1*  --  8.3* 9.8   MAG 1.8 1.7 1.8 1.8 1.9 2.3 2.5* 1.1* 1.2*   ALBUMIN  --   --   --   --   --  3.7  --  3.7 4.2       Recent Labs   Lab 05/08/25  0602 05/07/25  1114 05/07/25  0614 05/06/25  0952 05/05/25  0420 05/04/25  0842 05/03/25  0413   WBC 10.2 10.9 11.4* 12.5* 11.2* 11.5* 11.3*   HGB 6.8* 7.1* 7.0* 7.3* 7.3* 7.2* 7.7*   HCT 22.8* 24.0* 23.4* 24.8* 24.3* 23.3* 25.4*   MCV 76* 76* 76* 76* 76* 75* 75*    331 315 300 253 216 240             Current Facility-Administered Medications:     albuterol (PROVENTIL HFA/VENTOLIN HFA) inhaler, 2 puff, Inhalation, Q4H PRN, Tej Weller MD, 2 puff at 05/07/25 1846    budesonide (PULMICORT) neb solution 0.5 mg, 0.5 mg, Nebulization, 2 times daily, Maicol Gao MBBS, 0.5 mg at 05/08/25 0816    buprenorphine (BUTRANS) 5 MCG/HR WK patch 1 patch, 1 patch, Transdermal, Weekly **AND** buprenorphine (BUTRANS) Patch in Place, , Transdermal, Q8H [facility], [facility]    buPROPion (WELLBUTRIN XL) 24 hr tablet 300 mg, 300 mg, Oral, QANithin Donald John, MD, 300 mg at 05/07/25 0907    busPIRone (BUSPAR) tablet 5 mg, 5 mg, Oral, TID, Tej Weller MD, 5 mg at 05/07/25 2052    clopidogrel (PLAVIX) tablet 300 mg, 300 mg, Oral, Once, Ana Laura Bullock MD    [START ON 5/9/2025] clopidogrel (PLAVIX) tablet 75 mg, 75 mg, Oral, Daily, Satya Marie MD    Continuing beta blocker from home medication list OR beta blocker order already placed during this visit, , Does not apply, DOES NOT GO TO Gela LYNCH Kyle, MD    glucose gel 15-30 g, 15-30 g, Oral, Q15 Min PRN **OR** dextrose 50 % injection 25-50 mL, 25-50 mL, Intravenous, Q15 Min PRN **OR** glucagon injection 1 mg, 1 mg, Subcutaneous, Q15 Min PRN, Tej Weller MD    diclofenac (VOLTAREN) 1 % topical gel 2 g, 2 g, Topical,  4x Daily PRN, Tej Weller MD    fluticasone-vilanterol (BREO ELLIPTA) 200-25 MCG/ACT inhaler 1 puff, 1 puff, Inhalation, Daily, Tej Weller MD, 1 puff at 05/08/25 0809    formoterol (PERFOROMIST) neb solution 20 mcg, 20 mcg, Nebulization, 2 times daily, Maicol Gao MBBS, 20 mcg at 05/08/25 0816    furosemide (LASIX) tablet 20 mg, 20 mg, Oral, Daily, Satya Marie MD, 20 mg at 05/07/25 0905    gabapentin (NEURONTIN) capsule 300 mg, 300 mg, Oral, TID, Tej Weller MD, 300 mg at 05/07/25 2038    guaiFENesin-dextromethorphan (ROBITUSSIN DM) 100-10 MG/5ML syrup 10 mL, 10 mL, Oral, Q4H PRN, Maicol Gao MBBS, 10 mL at 05/07/25 2037    hydrALAZINE (APRESOLINE) injection 10 mg, 10 mg, Intravenous, Q4H PRN, Isiah Ren MD    insulin aspart (NovoLOG) injection (RAPID ACTING), 1-7 Units, Subcutaneous, TID AC, Maicol Gao MBBS, 1 Units at 05/08/25 0959    insulin aspart (NovoLOG) injection (RAPID ACTING), 1-5 Units, Subcutaneous, At Bedtime, Maicol Gao MBBS, 1 Units at 05/06/25 2028    insulin aspart (NovoLOG) injection (RAPID ACTING), , Subcutaneous, TID w/meals, Maicol Gao, MBBS, 10 Units at 05/07/25 1802    insulin glargine (LANTUS PEN) injection 25 Units, 25 Units, Subcutaneous, At Bedtime, Maicol Gao MBBS, 25 Units at 05/07/25 2056    ipratropium - albuterol 0.5 mg/2.5 mg/3 mL (DUONEB) neb solution 3 mL, 1 vial, Nebulization, Q6H PRN, Tej Weller MD, 3 mL at 05/02/25 1510    levothyroxine (SYNTHROID/LEVOTHROID) tablet 125 mcg, 125 mcg, Oral, QAM ELVIN, Tej Weller MD, 125 mcg at 05/08/25 0604    Lidocaine (LIDOCARE) 4 % Patch 1 patch, 1 patch, Transdermal, Q24H, Maicol Gao MBBS, 1 patch at 05/04/25 1010    melatonin tablet 10 mg, 10 mg, Oral, At Bedtime, Yan Sauer DO, 10 mg at 05/07/25 2038    [Held by provider] metFORMIN (GLUCOPHAGE XR) 24 hr tablet 1,000 mg, 1,000 mg, Oral, BID w/meals, Nithin  Tej Elliott MD    methocarbamol (ROBAXIN) tablet 500 mg, 500 mg, Oral, Q6H PRN, Tej Weller MD    metoprolol (LOPRESSOR) injection 5 mg, 5 mg, Intravenous, Q15 Min PRN, Isiah Ren MD    metoprolol succinate ER (TOPROL-XL) 24 hr half-tab 12.5 mg, 12.5 mg, Oral, BID, Satya Marie MD, 12.5 mg at 05/08/25 1000    midazolam (VERSED) injection 0.5 mg, 0.5 mg, Intravenous, Q5 Min PRN, Isiah Ren MD    naloxone (NARCAN) injection 0.2 mg, 0.2 mg, Intravenous, Q2 Min PRN **OR** naloxone (NARCAN) injection 0.4 mg, 0.4 mg, Intravenous, Q2 Min PRN **OR** naloxone (NARCAN) injection 0.2 mg, 0.2 mg, Intramuscular, Q2 Min PRN **OR** naloxone (NARCAN) injection 0.4 mg, 0.4 mg, Intramuscular, Q2 Min PRN, Isiah Ren MD    nitroGLYcerin (NITROSTAT) sublingual tablet 0.4 mg, 0.4 mg, Sublingual, Q5 Min PRN, Isiah Ren MD    ondansetron (ZOFRAN ODT) ODT tab 4 mg, 4 mg, Oral, Q6H PRN **OR** ondansetron (ZOFRAN) injection 4 mg, 4 mg, Intravenous, Q6H PRN, Isaih Ren MD, 4 mg at 05/07/25 2049    oxyCODONE (ROXICODONE) tablet 5 mg, 5 mg, Oral, Q4H PRN, 5 mg at 05/03/25 1445 **OR** oxyCODONE (ROXICODONE) tablet 10 mg, 10 mg, Oral, Q4H PRN, Isiah Ren MD    pantoprazole (PROTONIX) EC tablet 40 mg, 40 mg, Oral, QAM AC, Sima, Maicol B, MBBS, 40 mg at 05/08/25 0604    Percutaneous Coronary Intervention orders placed (this is information for BPA alerting), , Does not apply, DOES NOT GO TO Gela LYNCH Kyle, MD    QUEtiapine (SEROquel) tablet 25 mg, 25 mg, Oral, At Bedtime, Yan Sauer DO, 25 mg at 05/07/25 2039    sodium chloride (PF) 0.9% PF flush 10-20 mL, 10-20 mL, Intracatheter, q1 min prn, Maicol Gao B, MBBS    sodium chloride (PF) 0.9% PF flush 10-40 mL, 10-40 mL, Intracatheter, Once PRN, Marcus Siddiqui DO    sodium chloride (PF) 0.9% PF flush 10-40 mL, 10-40 mL, Intracatheter, Q7 Days, Maicol Gao B, MBBS, 10 mL at 05/02/25 2155    sodium chloride (PF) 0.9% PF  flush 10-40 mL, 10-40 mL, Intracatheter, Daily, Maicol Gao, MBBS, 30 mL at 05/07/25 0910    spironolactone (ALDACTONE) half-tab 12.5 mg, 12.5 mg, Oral, Daily, Satya Marie MD, 12.5 mg at 05/07/25 0905    topiramate (TOPAMAX) tablet 25 mg, 25 mg, Oral, BID PRN, Tej Weller MD    traZODone (DESYREL) tablet 50 mg, 50 mg, Oral, At Bedtime PRN, Tej Weller MD, 50 mg at 05/07/25 2038    valACYclovir (VALTREX) tablet 500 mg, 500 mg, Oral, Daily, Tej Weller MD, 500 mg at 05/07/25 0907    venlafaxine (EFFEXOR XR) 24 hr capsule 75 mg, 75 mg, Oral, Daily, Tej Weller MD, 75 mg at 05/07/25 0907      Labs personally reviewed today during this evaluation at 10:29 AM

## 2025-05-08 NOTE — PROGRESS NOTES
"Imp/plan:  S/p MI with PCI - complicated by anemia - EGD no source bleeding but drop in hgb again today, changed ticagrelor to clopiodgrel yeterday, stopped asp  HFrEF on spironolactone, furosemdie, K 4.5 Cr 1.42 today, metoprolol succcinate 12.5mg bid, hold ARB for now  Dyspnea - better today, lungs clear stopped ticagrelor but also hx COPD, elevated d-dimer due to recent MI, no DVT bilat,     Follow up [facility] or with our team Dr. Ren or Dr. Hendricks       Review of Systems: 12 points negative other than above    /59 (BP Location: Left arm)   Pulse 77   Temp 97.5  F (36.4  C) (Core)   Resp 18   Ht 1.651 m (5' 5\")   Wt 72.6 kg (160 lb 0.9 oz)   LMP 11/23/2012   SpO2 93%   BMI 26.63 kg/m    JVP<7cm, carotids normal  Lungs clear  Cor RRR no c,r,m  Abs soft +BS, no mass  Ext no c,c,e    Lab Results   Component Value Date    HGB 6.8 (LL) 05/08/2025     Lab Results   Component Value Date     05/08/2025     No results found for: \"CREATININE\"  No components found for: \"K\"          Satya Marie MD  Interventional Cardiology   [facility]  [phone]    "

## 2025-05-08 NOTE — PLAN OF CARE
Goal Outcome Evaluation:      Plan of Care Reviewed With: patient    Overall Patient Progress: no change        Problem: Adult Inpatient Plan of Care  Goal: Absence of Hospital-Acquired Illness or Injury  Outcome: Progressing     Problem: Adult Inpatient Plan of Care  Goal: Optimal Comfort and Wellbeing  Outcome: Progressing     Problem: Comorbidity Management  Goal: Blood Glucose Levels Within Targeted Range  Outcome: Progressing     Problem: Comorbidity Management  Goal: Blood Pressure in Desired Range  Outcome: Progressing  Problem: Risk for Delirium  Goal: Improved Sleep  Outcome: Progressing    Patient AxO x4. NSR. 3L NC. Denies SOB and chest pain. Endorses tenderness of RUE- bruising-> CMS intact, providers already aware. NPO at 0000 for EGD, currently scheduled for 1210. Plavix re-time for this evening. US completed for elevated D-Dimer, awaiting results. X1 assist to pivot to commode. +BM. BS monitored overnight. K and Mag protocols, recheck in AM. Patient able to make needs known, call light within reach. POCC.      0630: Critical Hgb of 6.8. Dr. Rascon notified. Awaiting orders.    Maria Del Carmen Haynes RN on 5/8/2025 at 6:37 AM

## 2025-05-08 NOTE — PLAN OF CARE
Problem: Cardiac Catheterization (Diagnostic/Interventional)  Goal: Absence of Vascular Access Complication  Outcome: Progressing  Intervention: Prevent and Manage Access Complications     Problem: Comorbidity Management  Goal: Maintenance of COPD Symptom Control  Outcome: Progressing  Goal: Maintenance of Heart Failure Symptom Control  Outcome: Progressing  Goal: Blood Pressure in Desired Range  Outcome: Progressing   Goal Outcome Evaluation:  Pt arrived from ICU via WC around 2245, Pt is A0x4, denies pain, soft BP. On 2 L NC. Call light within reach.Unable to assess skin, will hand over to next RN.

## 2025-05-08 NOTE — ANESTHESIA CARE TRANSFER NOTE
Patient: Gloria Penn    Procedure: Procedure(s):  ESOPHAGOGASTRODUODENOSCOPY, BIOPSIES OF DUODENUM, GASTRIC AND ESOPHAGUS.       Diagnosis: Anemia due to blood loss, acute [D62]  Diagnosis Additional Information: No value filed.    Anesthesia Type:   MAC     Note:    Oropharynx: oropharynx clear of all foreign objects and spontaneously breathing  Level of Consciousness: awake  Oxygen Supplementation: nasal cannula    Independent Airway: airway patency satisfactory and stable  Dentition: dentition unchanged  Vital Signs Stable: post-procedure vital signs reviewed and stable  Report to RN Given: handoff report given  Patient transferred to: Telemetry/Step Down Unit    Handoff Report: Identifed the Patient, Identified the Reponsible Provider, Reviewed the pertinent medical history, Discussed the surgical course, Reviewed Intra-OP anesthesia mangement and issues during anesthesia, Set expectations for post-procedure period and Allowed opportunity for questions and acknowledgement of understanding      Vitals:  Vitals Value Taken Time   /78    Temp 97.0    Pulse 65    Resp 12    SpO2 95        Electronically Signed By: MARY Vergara CRNA  May 8, 2025  12:21 PM

## 2025-05-09 ENCOUNTER — APPOINTMENT (OUTPATIENT)
Dept: RADIOLOGY | Facility: HOSPITAL | Age: 60
End: 2025-05-09
Attending: INTERNAL MEDICINE
Payer: COMMERCIAL

## 2025-05-09 LAB
ALBUMIN UR-MCNC: NEGATIVE MG/DL
ANION GAP SERPL CALCULATED.3IONS-SCNC: 10 MMOL/L (ref 7–15)
APPEARANCE UR: CLEAR
ATRIAL RATE - MUSE: 101 BPM
BACTERIA #/AREA URNS HPF: ABNORMAL /HPF
BILIRUB UR QL STRIP: NEGATIVE
BUN SERPL-MCNC: 36 MG/DL (ref 8–23)
CALCIUM SERPL-MCNC: 8.1 MG/DL (ref 8.8–10.4)
CHLORIDE SERPL-SCNC: 99 MMOL/L (ref 98–107)
COLOR UR AUTO: ABNORMAL
CREAT SERPL-MCNC: 1.43 MG/DL (ref 0.51–0.95)
CYSTATIN C (ROCHE): 1.8 MG/L (ref 0.6–1)
DIASTOLIC BLOOD PRESSURE - MUSE: NORMAL MMHG
EGFRCR SERPLBLD CKD-EPI 2021: 42 ML/MIN/1.73M2
ERYTHROCYTE [DISTWIDTH] IN BLOOD BY AUTOMATED COUNT: 16.5 % (ref 10–15)
GFR/BSA.PRED SERPLBLD CYS-BASED-ARV: 33 ML/MIN/1.73M2
GLUCOSE BLDC GLUCOMTR-MCNC: 118 MG/DL (ref 70–99)
GLUCOSE BLDC GLUCOMTR-MCNC: 197 MG/DL (ref 70–99)
GLUCOSE BLDC GLUCOMTR-MCNC: 85 MG/DL (ref 70–99)
GLUCOSE BLDC GLUCOMTR-MCNC: 90 MG/DL (ref 70–99)
GLUCOSE SERPL-MCNC: 87 MG/DL (ref 70–99)
GLUCOSE UR STRIP-MCNC: NEGATIVE MG/DL
HCO3 SERPL-SCNC: 22 MMOL/L (ref 22–29)
HCT VFR BLD AUTO: 26.1 % (ref 35–47)
HGB BLD-MCNC: 7.9 G/DL (ref 11.7–15.7)
HGB UR QL STRIP: NEGATIVE
INTERPRETATION ECG - MUSE: NORMAL
KETONES UR STRIP-MCNC: NEGATIVE MG/DL
LEUKOCYTE ESTERASE UR QL STRIP: ABNORMAL
MAGNESIUM SERPL-MCNC: 1.8 MG/DL (ref 1.7–2.3)
MCH RBC QN AUTO: 23.2 PG (ref 26.5–33)
MCHC RBC AUTO-ENTMCNC: 30.3 G/DL (ref 31.5–36.5)
MCV RBC AUTO: 77 FL (ref 78–100)
NITRATE UR QL: NEGATIVE
P AXIS - MUSE: 62 DEGREES
PATH REPORT.ADDENDUM SPEC: NORMAL
PATH REPORT.COMMENTS IMP SPEC: NORMAL
PATH REPORT.COMMENTS IMP SPEC: NORMAL
PATH REPORT.FINAL DX SPEC: NORMAL
PATH REPORT.GROSS SPEC: NORMAL
PATH REPORT.MICROSCOPIC SPEC OTHER STN: NORMAL
PATH REPORT.RELEVANT HX SPEC: NORMAL
PH UR STRIP: 6 [PH] (ref 5–7)
PHOTO IMAGE: NORMAL
PLATELET # BLD AUTO: 401 10E3/UL (ref 150–450)
POTASSIUM SERPL-SCNC: 4.6 MMOL/L (ref 3.4–5.3)
PR INTERVAL - MUSE: 126 MS
QRS DURATION - MUSE: 106 MS
QT - MUSE: 378 MS
QTC - MUSE: 490 MS
R AXIS - MUSE: 65 DEGREES
RBC # BLD AUTO: 3.41 10E6/UL (ref 3.8–5.2)
RBC URINE: 0 /HPF
SODIUM SERPL-SCNC: 131 MMOL/L (ref 135–145)
SP GR UR STRIP: 1.01 (ref 1–1.03)
SQUAMOUS EPITHELIAL: 8 /HPF
SYSTOLIC BLOOD PRESSURE - MUSE: NORMAL MMHG
T AXIS - MUSE: 93 DEGREES
UROBILINOGEN UR STRIP-MCNC: NORMAL MG/DL
VENTRICULAR RATE- MUSE: 101 BPM
WBC # BLD AUTO: 10.8 10E3/UL (ref 4–11)
WBC URINE: 4 /HPF

## 2025-05-09 PROCEDURE — 81001 URINALYSIS AUTO W/SCOPE: CPT | Performed by: INTERNAL MEDICINE

## 2025-05-09 PROCEDURE — 83735 ASSAY OF MAGNESIUM: CPT | Performed by: INTERNAL MEDICINE

## 2025-05-09 PROCEDURE — 250N000013 HC RX MED GY IP 250 OP 250 PS 637: Performed by: INTERNAL MEDICINE

## 2025-05-09 PROCEDURE — 87081 CULTURE SCREEN ONLY: CPT | Performed by: STUDENT IN AN ORGANIZED HEALTH CARE EDUCATION/TRAINING PROGRAM

## 2025-05-09 PROCEDURE — 82310 ASSAY OF CALCIUM: CPT | Performed by: INTERNAL MEDICINE

## 2025-05-09 PROCEDURE — 82610 CYSTATIN C: CPT | Performed by: INTERNAL MEDICINE

## 2025-05-09 PROCEDURE — 71046 X-RAY EXAM CHEST 2 VIEWS: CPT

## 2025-05-09 PROCEDURE — 120N000004 HC R&B MS OVERFLOW

## 2025-05-09 PROCEDURE — 99233 SBSQ HOSP IP/OBS HIGH 50: CPT | Performed by: STUDENT IN AN ORGANIZED HEALTH CARE EDUCATION/TRAINING PROGRAM

## 2025-05-09 PROCEDURE — 250N000011 HC RX IP 250 OP 636: Performed by: INTERNAL MEDICINE

## 2025-05-09 PROCEDURE — 250N000013 HC RX MED GY IP 250 OP 250 PS 637: Performed by: STUDENT IN AN ORGANIZED HEALTH CARE EDUCATION/TRAINING PROGRAM

## 2025-05-09 PROCEDURE — 85041 AUTOMATED RBC COUNT: CPT | Performed by: INTERNAL MEDICINE

## 2025-05-09 RX ORDER — FUROSEMIDE 20 MG/1
20 TABLET ORAL
Status: DISCONTINUED | OUTPATIENT
Start: 2025-05-09 | End: 2025-05-10

## 2025-05-09 RX ORDER — SPIRONOLACTONE 25 MG
12.5 TABLET ORAL
Status: DISCONTINUED | OUTPATIENT
Start: 2025-05-09 | End: 2025-05-11

## 2025-05-09 RX ORDER — FUROSEMIDE 10 MG/ML
40 INJECTION INTRAMUSCULAR; INTRAVENOUS 2 TIMES DAILY
Status: DISCONTINUED | OUTPATIENT
Start: 2025-05-09 | End: 2025-05-11

## 2025-05-09 RX ADMIN — FUROSEMIDE 20 MG: 20 TABLET ORAL at 12:13

## 2025-05-09 RX ADMIN — BUSPIRONE HYDROCHLORIDE 5 MG: 5 TABLET ORAL at 20:30

## 2025-05-09 RX ADMIN — VENLAFAXINE HYDROCHLORIDE 75 MG: 75 CAPSULE, EXTENDED RELEASE ORAL at 08:19

## 2025-05-09 RX ADMIN — CLOPIDOGREL 75 MG: 75 TABLET ORAL at 20:30

## 2025-05-09 RX ADMIN — FUROSEMIDE 40 MG: 10 INJECTION, SOLUTION INTRAMUSCULAR; INTRAVENOUS at 16:20

## 2025-05-09 RX ADMIN — BUPROPION HYDROCHLORIDE 300 MG: 300 TABLET, EXTENDED RELEASE ORAL at 08:25

## 2025-05-09 RX ADMIN — INSULIN ASPART 3 UNITS: 100 INJECTION, SOLUTION INTRAVENOUS; SUBCUTANEOUS at 12:13

## 2025-05-09 RX ADMIN — SPIRONOLACTONE 12.5 MG: 25 TABLET, FILM COATED ORAL at 17:34

## 2025-05-09 RX ADMIN — TRAZODONE HYDROCHLORIDE 50 MG: 50 TABLET ORAL at 20:36

## 2025-05-09 RX ADMIN — PANTOPRAZOLE SODIUM 40 MG: 40 TABLET, DELAYED RELEASE ORAL at 06:44

## 2025-05-09 RX ADMIN — Medication 10 MG: at 20:29

## 2025-05-09 RX ADMIN — GABAPENTIN 300 MG: 300 CAPSULE ORAL at 08:17

## 2025-05-09 RX ADMIN — GABAPENTIN 300 MG: 300 CAPSULE ORAL at 14:35

## 2025-05-09 RX ADMIN — BUSPIRONE HYDROCHLORIDE 5 MG: 5 TABLET ORAL at 14:35

## 2025-05-09 RX ADMIN — BUSPIRONE HYDROCHLORIDE 5 MG: 5 TABLET ORAL at 08:18

## 2025-05-09 RX ADMIN — LEVOTHYROXINE SODIUM 125 MCG: 0.12 TABLET ORAL at 06:44

## 2025-05-09 RX ADMIN — METOPROLOL SUCCINATE 12.5 MG: 25 TABLET, EXTENDED RELEASE ORAL at 20:30

## 2025-05-09 RX ADMIN — QUETIAPINE FUMARATE 25 MG: 25 TABLET ORAL at 20:30

## 2025-05-09 RX ADMIN — GUAIFENESIN AND DEXTROMETHORPHAN 10 ML: 100; 10 SYRUP ORAL at 20:35

## 2025-05-09 RX ADMIN — FLUTICASONE FUROATE AND VILANTEROL TRIFENATATE 1 PUFF: 200; 25 POWDER RESPIRATORY (INHALATION) at 08:20

## 2025-05-09 RX ADMIN — INSULIN ASPART 8 UNITS: 100 INJECTION, SOLUTION INTRAVENOUS; SUBCUTANEOUS at 18:54

## 2025-05-09 RX ADMIN — METOPROLOL SUCCINATE 12.5 MG: 25 TABLET, EXTENDED RELEASE ORAL at 08:16

## 2025-05-09 RX ADMIN — GABAPENTIN 300 MG: 300 CAPSULE ORAL at 20:29

## 2025-05-09 RX ADMIN — VALACYCLOVIR HYDROCHLORIDE 500 MG: 500 TABLET, FILM COATED ORAL at 08:19

## 2025-05-09 ASSESSMENT — ACTIVITIES OF DAILY LIVING (ADL)
ADLS_ACUITY_SCORE: 64
ADLS_ACUITY_SCORE: 63
ADLS_ACUITY_SCORE: 59
ADLS_ACUITY_SCORE: 64

## 2025-05-09 NOTE — PLAN OF CARE
Problem: Gas Exchange Impaired  Goal: Optimal Gas Exchange  Outcome: Progressing     Goal Outcome Evaluation:       SUBJECTIVE:  A/o x4 on 2 L NC. Pt using the commode with A2 gait belt and walker.     Plan is to follow Cardiac, ID, and nephrology plan of care.   Michael Betancur RN

## 2025-05-09 NOTE — PLAN OF CARE
Problem: Gas Exchange Impaired  Goal: Optimal Gas Exchange  Intervention: Optimize Oxygenation and Ventilation  Recent Flowsheet Documentation  Taken 5/9/2025 0116 by Joe Bliss RN  Head of Bed (HOB) Positioning: HOB at 20-30 degrees  Taken 5/8/2025 2117 by Joe Bliss RN  Head of Bed (HOB) Positioning: HOB at 20-30 degrees     Problem: Adult Inpatient Plan of Care  Goal: Optimal Comfort and Wellbeing  Intervention: Monitor Pain and Promote Comfort  Recent Flowsheet Documentation  Taken 5/8/2025 2117 by Joe Bliss RN  Pain Management Interventions: medication (see MAR)     Problem: Comorbidity Management  Goal: Blood Pressure in Desired Range  Intervention: Maintain Blood Pressure Management  Recent Flowsheet Documentation  Taken 5/9/2025 0116 by Joe Bliss RN  Medication Review/Management: medications reviewed  Taken 5/8/2025 2117 by Joe Bliss RN  Medication Review/Management: medications reviewed   Goal Outcome Evaluation:      A/Ox 4. Reported back pain at 7/10, prn Robaxin and Roxicodone 5 mg given with relief. Gave prn duoneb overnight once. Robitussin gave once per pt request. Pt got some rest in between cares. UA sent. Assist x 1-2 to pivot to bedside commode. Call light within reach. Bed alarm on for safety.     Hgb is 7.9.  Mag and K protocols, recheck tomorrow AM.

## 2025-05-09 NOTE — PROGRESS NOTES
"  '    RENAL ([facility]) progress note  CC: F/U REE  S: Reconsulted yesterday due to rising creatinine in setting of CHF, aggressive diuresis, hypotension to 80s-90s(lower from 100s-120s 4-5d ago). No cp, sob at rest, some CORTEZ. Some leg edema.   REE discussed with Dr. Bullock today at bedside.    A/P:   REE: Creatinine of 1.47 up from 0.99 on 5/2. Patient with recent angio and 2 stent placement(5/1)also with significant hypotension on 5/2. In the setting of CHF and ongoing diuresis.   Creatinine rising in past few days, but stable from yesterday.   As more edema today, will need to restart diuretic, may need to tolerate higher creatinine to keep at a reasonable volume status.  Check cystatin C  Daily renal function labs, wts, I/Os     2. CAD: w/ STEMI s/p angio and stent placement. Per cardiology.     3. HFpEF: setting of acute STEMI,    -more edema today   -Start lasix 20mg po bid.    4. Type 2 DM: per primary.     5. HTN: Bps lower recently. On metoprolol. Holding home lisinopril. Per cards.       Jerzy Hanna MD  [facility]  [phone]         No interval changes to past medical history, social history or family history to report.    /63 (BP Location: Left arm)   Pulse 83   Temp 98.4  F (36.9  C) (Oral)   Resp 18   Ht 1.651 m (5' 5\")   Wt 74.7 kg (164 lb 10.9 oz)   LMP 11/23/2012   SpO2 (!) 91%   BMI 27.40 kg/m      I/O last 3 completed shifts:  In: 1085 [P.O.:960]  Out: 625 [Urine:625]    Physical Exam:   GENERAL: NAD  EYES: No scleral icterus, conjunctiva clear  ENT: Hearing normal, MMM  RESP: anterior clear.   CV: RRR, trace leg edema.    GI: Soft, NT  Musculoskeletal: right leg AKA  SKIN: No rash,  PSYCH:  Appropriate mood and affect    Recent Labs   Lab 05/09/25  0512 05/08/25  0602 05/07/25  0614 05/06/25  0416 05/05/25  0420 05/04/25  0415 05/03/25  0413   * 132* 131* 131* 135 134* 133*   POTASSIUM 4.6 4.5 4.5 4.7 4.4 4.2 3.5   CHLORIDE 99 98 98 99 103 100 99   CO2 22 22 23 23 " 25 21* 23   BUN 36.0* 35.8* 29.0* 24.6* 19.6 26.6* 27.3*   CR 1.43* 1.42* 1.37* 1.32* 0.92 1.10* 1.47*   GFRESTIMATED 42* 42* 44* 46* 71 58* 41*   LESYL 8.1* 8.2* 8.3* 8.2* 8.1* 8.4* 8.1*   MAG 1.8 1.8 1.7 1.8 1.8 1.9 2.3   ALBUMIN  --   --   --   --   --   --  3.7       Recent Labs   Lab 05/09/25  0512 05/08/25  1734 05/08/25  0602 05/07/25  1114 05/07/25  0614 05/06/25  0952 05/05/25  0420 05/04/25  0842   WBC 10.8  --  10.2 10.9 11.4* 12.5* 11.2* 11.5*   HGB 7.9* 7.9* 6.8* 7.1* 7.0* 7.3* 7.3* 7.2*   HCT 26.1*  --  22.8* 24.0* 23.4* 24.8* 24.3* 23.3*   MCV 77*  --  76* 76* 76* 76* 76* 75*     --  329 331 315 300 253 216             Current Facility-Administered Medications:     albuterol (PROVENTIL HFA/VENTOLIN HFA) inhaler, 2 puff, Inhalation, Q4H PRN, Candice Kline MD, 2 puff at 05/07/25 1846    buprenorphine (BUTRANS) 5 MCG/HR WK patch 1 patch, 1 patch, Transdermal, Weekly **AND** buprenorphine (BUTRANS) Patch in Place, , Transdermal, Q8H [facility], Candice Kline MD    buPROPion (WELLBUTRIN XL) 24 hr tablet 300 mg, 300 mg, Oral, QAM, Candice Kline MD, 300 mg at 05/09/25 0825    busPIRone (BUSPAR) tablet 5 mg, 5 mg, Oral, TID, Candice Kline MD, 5 mg at 05/09/25 0818    clopidogrel (PLAVIX) tablet 75 mg, 75 mg, Oral, Daily, Candice Kline MD    Continuing beta blocker from home medication list OR beta blocker order already placed during this visit, , Does not apply, DOES NOT GO TO [facility], Candice Kline MD    glucose gel 15-30 g, 15-30 g, Oral, Q15 Min PRN **OR** dextrose 50 % injection 25-50 mL, 25-50 mL, Intravenous, Q15 Min PRN **OR** glucagon injection 1 mg, 1 mg, Subcutaneous, Q15 Min PRN, Candice Kline MD    diclofenac (VOLTAREN) 1 % topical gel 2 g, 2 g, Topical, 4x Daily PRN, Candice Kline MD    fluticasone-vilanterol (BREO ELLIPTA) 200-25 MCG/ACT inhaler 1 puff, 1 puff, Inhalation, Daily, Candice Kline MD, 1 puff at  05/09/25 0820    gabapentin (NEURONTIN) capsule 300 mg, 300 mg, Oral, TID, Candice Kline MD, 300 mg at 05/09/25 0817    guaiFENesin-dextromethorphan (ROBITUSSIN DM) 100-10 MG/5ML syrup 10 mL, 10 mL, Oral, Q4H PRN, Candice Kline MD, 10 mL at 05/08/25 2124    hydrALAZINE (APRESOLINE) injection 10 mg, 10 mg, Intravenous, Q4H PRN, Candice Kline MD    insulin aspart (NovoLOG) injection (RAPID ACTING), 1-7 Units, Subcutaneous, TID AC, Candice Kline MD, 3 Units at 05/08/25 1727    insulin aspart (NovoLOG) injection (RAPID ACTING), 1-5 Units, Subcutaneous, At Bedtime, Candice Kline MD, 2 Units at 05/08/25 2134    insulin aspart (NovoLOG) injection (RAPID ACTING), , Subcutaneous, TID w/meals, Candice Kline MD, 6 Units at 05/08/25 1842    insulin glargine (LANTUS PEN) injection 25 Units, 25 Units, Subcutaneous, At Bedtime, Candice Kline MD, 25 Units at 05/08/25 2134    ipratropium - albuterol 0.5 mg/2.5 mg/3 mL (DUONEB) neb solution 3 mL, 1 vial, Nebulization, Q6H PRN, Candice Kline MD, 3 mL at 05/08/25 2146    levothyroxine (SYNTHROID/LEVOTHROID) tablet 125 mcg, 125 mcg, Oral, QAM AC, Candice Kline MD, 125 mcg at 05/09/25 0644    Lidocaine (LIDOCARE) 4 % Patch 1 patch, 1 patch, Transdermal, Q24H, Candice Kline MD, 1 patch at 05/04/25 1010    melatonin tablet 10 mg, 10 mg, Oral, At Bedtime, Candice Kline MD, 10 mg at 05/08/25 2130    [Held by provider] metFORMIN (GLUCOPHAGE XR) 24 hr tablet 1,000 mg, 1,000 mg, Oral, BID w/meals, Tej Weller MD    methocarbamol (ROBAXIN) tablet 500 mg, 500 mg, Oral, Q6H PRN, Candice Kline MD, 500 mg at 05/08/25 2137    metoprolol (LOPRESSOR) injection 5 mg, 5 mg, Intravenous, Q15 Min PRN, Candice Kline MD    metoprolol succinate ER (TOPROL-XL) 24 hr half-tab 12.5 mg, 12.5 mg, Oral, BID, Candice Kline MD, 12.5 mg at 05/09/25 0816    midazolam  (VERSED) injection 0.5 mg, 0.5 mg, Intravenous, Q5 Min PRN, Candice Kline MD    naloxone (NARCAN) injection 0.2 mg, 0.2 mg, Intravenous, Q2 Min PRN **OR** naloxone (NARCAN) injection 0.4 mg, 0.4 mg, Intravenous, Q2 Min PRN **OR** naloxone (NARCAN) injection 0.2 mg, 0.2 mg, Intramuscular, Q2 Min PRN **OR** naloxone (NARCAN) injection 0.4 mg, 0.4 mg, Intramuscular, Q2 Min PRN, Candice Kline MD    nitroGLYcerin (NITROSTAT) sublingual tablet 0.4 mg, 0.4 mg, Sublingual, Q5 Min PRN, Candice Kline MD    ondansetron (ZOFRAN ODT) ODT tab 4 mg, 4 mg, Oral, Q6H PRN **OR** ondansetron (ZOFRAN) injection 4 mg, 4 mg, Intravenous, Q6H PRN, Candice Kline MD, 4 mg at 05/07/25 2049    oxyCODONE (ROXICODONE) tablet 5 mg, 5 mg, Oral, Q4H PRN, 5 mg at 05/08/25 2137 **OR** oxyCODONE (ROXICODONE) tablet 10 mg, 10 mg, Oral, Q4H PRN, Candice Kline MD    pantoprazole (PROTONIX) EC tablet 40 mg, 40 mg, Oral, QAM AC, Candice Kline MD, 40 mg at 05/09/25 0644    Percutaneous Coronary Intervention orders placed (this is information for BPA alerting), , Does not apply, DOES NOT GO TO Eliud LYNCH Catherine Leja, MD    QUEtiapine (SEROquel) tablet 25 mg, 25 mg, Oral, At Bedtime, Candice Kline MD, 25 mg at 05/08/25 2120    sodium chloride (PF) 0.9% PF flush 10-20 mL, 10-20 mL, Intracatheter, q1 min prn, Candice Kline MD    sodium chloride (PF) 0.9% PF flush 10-40 mL, 10-40 mL, Intracatheter, Once PRN, Candice Kline MD    sodium chloride (PF) 0.9% PF flush 10-40 mL, 10-40 mL, Intracatheter, Q7 Days, Candice Kline MD, 10 mL at 05/02/25 2155    sodium chloride (PF) 0.9% PF flush 10-40 mL, 10-40 mL, Intracatheter, Daily, Candice Kline MD, 30 mL at 05/09/25 0826    topiramate (TOPAMAX) tablet 25 mg, 25 mg, Oral, BID PRN, Candice Kline MD    traZODone (DESYREL) tablet 50 mg, 50 mg, Oral, At Bedtime PRN, Candice Kline MD, 50 mg  at 05/08/25 2125    valACYclovir (VALTREX) tablet 500 mg, 500 mg, Oral, Daily, Candice Kline MD, 500 mg at 05/09/25 0819    venlafaxine (EFFEXOR XR) 24 hr capsule 75 mg, 75 mg, Oral, Daily, Candice Kline MD, 75 mg at 05/09/25 0819      Labs personally reviewed today during this evaluation at 10:29 AM

## 2025-05-09 NOTE — PROGRESS NOTES
[facility]   [address]  [place] [address]   [email or web address]   OFFICE: [phone]     CARDIOLOGY FOLLOW-UP NOTE      Impression and Plan     Impression:   STEMI/coronary artery disease: Status post STEMI with acute thrombotic occlusion of LAD and first diagonal treated with STEPHANIE x2 5/1/25.  Now on Plavix 75 mg daily.  Aspirin has been held due to downtrending hemoglobin.  After 1 year may consider Plavix monotherapy indefinitely.  Patient had developed shortness of breath on Brilinta.  Severe diffuse in-stent restenosis of RCA was seen and medical management was recommended.  On metoprolol succinate 12.5 mg twice daily  Hyperlipidemia: On repatha. LDL 20  Ischemic cardiomyopathy: LVEF 25 to 30% on echo 5/2/25.  Significantly elevated left heart filling pressures on CABG with LVEDP of 35.  On Lasix 20 mg twice daily, metoprolol 12.5 mg twice daily.  Lisinopril has been held given low blood pressures.  Spironolactone had been discontinued.  Hypertension: Controlled.  Home lisinopril has been held given lower blood pressures and REE.  Diabetes mellitus type 2: Non-insulin-dependent PTA  CKD: Creatinine 1.43.  Nephrology following    Plan:  Continue metoprolol 12.5 mg twice daily  CXR given dyspnea  Start spironolactone 12.5 mg BID  Switch oral lasix to IV  Continue repatha    Primary Cardiologist: Zac CLINTON at [facility]    Subjective     Patient denies any recurrence of the chest discomfort she had prior to coming in. Continue to note some shortness of breath that she denied having before coming in. Denies palpitations.     Cardiac Diagnostics     Echocardiogram 5/2/25 (results reviewed):   1. Left ventricular chamber size is normal. Mild concentric increase in wall  thickness. Systolic function is severe reduced with anterior, septal and  apical wall akinesis. The cvisually estimated left ventricular ejection  fraction  "is 25-30%.  2. No left ventricular thrombus.  3. Right ventricular chamber size and systolic function are normal.  4. No hemodynamically significant valvular abnormalities.  5. No prior study available for comparison.    Cardiac Cath 5/1/2025 (results reviewed):   Coronary dominance: Right     LM: 20%  LAD:              P: 100%, culprit  Lcx:              P: 20%              M: 80%                          D: 80%  OM1: 20%  OM2: 80%  RCA:              P: 80%              M: 80%              D: 80%  PL: 100%  PDA: 80%           Conclusions:      1.  Severely elevated left heart filling pressures.  2.  Acute thrombotic occlusion of the left anterior descending coronary artery, culprit for STEMI.  3.  Severe diffuse in-stent restenosis of the right coronary artery.  4.  Successful recanalization and stenting of the left anterior descending coronary and first diagonal coronary artery with 2 everolimus eluding stents utilizing IVUS guidance and a reverse tap technique.      Physical Examination       /63 (BP Location: Left arm)   Pulse 83   Temp 98.4  F (36.9  C) (Oral)   Resp 18   Ht 1.651 m (5' 5\")   Wt 74.7 kg (164 lb 10.9 oz)   LMP 11/23/2012   SpO2 (!) 91%   BMI 27.40 kg/m                Intake/Output Summary (Last 24 hours) at 5/9/2025 1315  Last data filed at 5/9/2025 0900  Gross per 24 hour   Intake 1200 ml   Output 300 ml   Net 900 ml       General: pleasant female. No acute distress.   HENT: external ears normal. Nares patent. Mucous membranes moist.  Eyes: perrla, extraocular muscles intact. No scleral icterus.   Neck: No JVD  Lungs: clear to auscultation  COR:  regular rate and rhythm, No murmurs, rubs, or gallops  Extrem: Mild edema in lower extremities         Imaging      CXR 5/1/25  IMPRESSION: Right PICC line in good position, tip low SVC. Heart size normal. New small bilateral pleural effusions as well as mild diffuse increase in interstitial prominence. No focal pneumonia.     Lab Results "   Lab Results   Component Value Date    CHOL 121 05/01/2025    HDL 45 (L) 05/01/2025    TRIG 280 (H) 05/01/2025    BUN 36.0 (H) 05/09/2025     (L) 05/09/2025    CO2 22 05/09/2025       Lab Results   Component Value Date    WBC 10.8 05/09/2025    HGB 7.9 (L) 05/09/2025    HCT 26.1 (L) 05/09/2025    MCV 77 (L) 05/09/2025     05/09/2025       Lab Results   Component Value Date    TSH 2.67 03/19/2025    INR 1.03 05/01/2025               Current Inpatient Scheduled Medications   Scheduled Meds:  Current Facility-Administered Medications   Medication Dose Route Frequency Provider Last Rate Last Admin    buprenorphine (BUTRANS) 5 MCG/HR WK patch 1 patch  1 patch Transdermal Weekly Candice Kilne MD        And    buprenorphine (BUTRANS) Patch in Place   Transdermal Q8H [facility] Candice Kline MD        buPROPion (WELLBUTRIN XL) 24 hr tablet 300 mg  300 mg Oral QAM Candice Kline MD   300 mg at 05/09/25 0825    busPIRone (BUSPAR) tablet 5 mg  5 mg Oral TID Candice Kline MD   5 mg at 05/09/25 0818    clopidogrel (PLAVIX) tablet 75 mg  75 mg Oral Daily Candice Kline MD        fluticasone-vilanterol (BREO ELLIPTA) 200-25 MCG/ACT inhaler 1 puff  1 puff Inhalation Daily Candice Kline MD   1 puff at 05/09/25 0820    furosemide (LASIX) tablet 20 mg  20 mg Oral BID Jerzy Hanna MD   20 mg at 05/09/25 1213    gabapentin (NEURONTIN) capsule 300 mg  300 mg Oral TID Candice Kline MD   300 mg at 05/09/25 0817    insulin aspart (NovoLOG) injection (RAPID ACTING)  1-7 Units Subcutaneous TID AC Candice Kline MD   3 Units at 05/08/25 1727    insulin aspart (NovoLOG) injection (RAPID ACTING)  1-5 Units Subcutaneous At Bedtime Candice Kline MD   2 Units at 05/08/25 2134    insulin aspart (NovoLOG) injection (RAPID ACTING)   Subcutaneous TID w/meals Skagen, Candice Womack MD   3 Units at 05/09/25 1213    insulin glargine (LANTUS PEN)  injection 25 Units  25 Units Subcutaneous At Bedtime Candice Kline MD   25 Units at 05/08/25 2134    levothyroxine (SYNTHROID/LEVOTHROID) tablet 125 mcg  125 mcg Oral QAM  Candice Kline MD   125 mcg at 05/09/25 0644    Lidocaine (LIDOCARE) 4 % Patch 1 patch  1 patch Transdermal Q24H Candice Kline MD   1 patch at 05/04/25 1010    melatonin tablet 10 mg  10 mg Oral At Bedtime Candice Kline MD   10 mg at 05/08/25 2130    [Held by provider] metFORMIN (GLUCOPHAGE XR) 24 hr tablet 1,000 mg  1,000 mg Oral BID w/meals Tej Weller MD        metoprolol succinate ER (TOPROL-XL) 24 hr half-tab 12.5 mg  12.5 mg Oral BID Candice Kline MD   12.5 mg at 05/09/25 0816    pantoprazole (PROTONIX) EC tablet 40 mg  40 mg Oral QAM  Candice Kline MD   40 mg at 05/09/25 0644    QUEtiapine (SEROquel) tablet 25 mg  25 mg Oral At Bedtime Candice Kline MD   25 mg at 05/08/25 2120    sodium chloride (PF) 0.9% PF flush 10-40 mL  10-40 mL Intracatheter Q7 Days Candice Kline MD   10 mL at 05/02/25 2155    sodium chloride (PF) 0.9% PF flush 10-40 mL  10-40 mL Intracatheter Daily Candice Kline MD   30 mL at 05/09/25 0826    valACYclovir (VALTREX) tablet 500 mg  500 mg Oral Daily Candice Kline MD   500 mg at 05/09/25 0819    venlafaxine (EFFEXOR XR) 24 hr capsule 75 mg  75 mg Oral Daily Candice Kline MD   75 mg at 05/09/25 0819     Continuous Infusions:  Current Facility-Administered Medications   Medication Dose Route Frequency Provider Last Rate Last Admin    Continuing beta blocker from home medication list OR beta blocker order already placed during this visit   Does not apply DOES NOT GO TO Candice Felipe MD        Percutaneous Coronary Intervention orders placed (this is information for BPA alerting)   Does not apply DOES NOT GO TO Candice Felipe MD                Medications Prior to Admission    Prior to Admission medications    Medication Sig Start Date End Date Taking? Authorizing Provider   acetaminophen (TYLENOL) 500 MG tablet Take 1,000 mg by mouth every 6 hours as needed for mild pain.   Yes Unknown, Entered By History   albuterol (PROAIR HFA/PROVENTIL HFA/VENTOLIN HFA) 108 (90 Base) MCG/ACT inhaler Inhale 2 puffs into the lungs every 4 hours as needed for shortness of breath, wheezing or cough.   Yes Unknown, Entered By History   alpha-lipoic acid 600 MG capsule Take 600 mg by mouth daily.   Yes Unknown, Entered By History   aspirin (ASA) 81 MG chewable tablet Take 1 tablet (81 mg) by mouth daily. Starting tomorrow. 5/1/25  Yes Isiah Ren MD   aspirin 81 MG tablet Take 1 tablet by mouth daily.   Yes Reported, Patient   buprenorphine (BUTRANS) 5 MCG/HR WK patch Place 1 patch onto the skin every 7 days.   Yes Unknown, Entered By History   buPROPion (WELLBUTRIN XL) 300 MG 24 hr tablet Take 300 mg by mouth every morning.   Yes Unknown, Entered By History   busPIRone (BUSPAR) 5 MG tablet Take 5 mg by mouth 3 times daily.   Yes Unknown, Entered By History   carvedilol (COREG) 25 MG tablet Take 25 mg by mouth 2 times daily (with meals).   Yes Unknown, Entered By History   diclofenac (VOLTAREN) 1 % topical gel Apply 2 g topically 4 times daily as needed for moderate pain.   Yes Unknown, Entered By History   evolocumab (REPATHA) 140 MG/ML prefilled autoinjector Inject 140 mg subcutaneously every 14 days.   Yes Unknown, Entered By History   fluticasone-salmeterol (ADVAIR DISKUS) 500-50 MCG/DOSE diskus inhaler Inhale 1 puff into the lungs every 12 hours. 11/8/12  Yes Alia Philippe MD   gabapentin (NEURONTIN) 100 MG capsule Take 300 mg by mouth 3 times daily.   Yes Unknown, Entered By History   insulin glargine (LANTUS PEN) 100 UNIT/ML pen Inject 23 Units subcutaneously at bedtime.   Yes Unknown, Entered By History   ipratropium - albuterol 0.5 mg/2.5 mg/3 mL (DUONEB) 0.5-2.5 (3) MG/3ML neb solution  Take 1 vial by nebulization every 6 hours as needed for shortness of breath, wheezing or cough.   Yes Unknown, Entered By History   levothyroxine (SYNTHROID/LEVOTHROID) 125 MCG tablet Take 125 mcg by mouth every morning (before breakfast).   Yes Unknown, Entered By History   Lidocaine (LIDOCARE) 4 % Patch Place 1 patch onto the skin every 24 hours. To prevent lidocaine toxicity, patient should be patch free for 12 hrs daily.   Yes Unknown, Entered By History   lisinopril (ZESTRIL) 30 MG tablet Take 30 mg by mouth daily.   Yes Unknown, Entered By History   Melatonin 10 MG TABS tablet Take 10 mg by mouth at bedtime.   Yes Unknown, Entered By History   metFORMIN (GLUCOPHAGE XR) 500 MG 24 hr tablet Take 1,000 mg by mouth 2 times daily (with meals).   Yes Unknown, Entered By History   methocarbamol (ROBAXIN) 500 MG tablet Take 500 mg by mouth every 6 hours as needed for muscle spasms.   Yes Unknown, Entered By History   multivitamin w/minerals (THERA-VIT-M) tablet Take 1 tablet by mouth daily.   Yes Unknown, Entered By History   nystatin (MYCOSTATIN) 938785 UNIT/GM external powder Apply topically 2 times daily as needed for other.   Yes Unknown, Entered By History   ondansetron (ZOFRAN ODT) 4 MG ODT tab Take 4 mg by mouth every 8 hours as needed for nausea.   Yes Unknown, Entered By History   pantoprazole (PROTONIX) 20 MG EC tablet Take 20 mg by mouth daily.   Yes Unknown, Entered By History   QUEtiapine (SEROQUEL) 25 MG tablet Take 25 mg by mouth at bedtime.   Yes Unknown, Entered By History   rosuvastatin (CRESTOR) 40 MG tablet Take 1 tablet (40 mg) by mouth daily. 5/1/25  Yes Isiah Ren MD   ticagrelor (BRILINTA) 90 MG tablet Take 1 tablet (90 mg) by mouth 2 times daily. Dose to start tomorrow morning. 5/2/25  Yes Isiah Ren MD   topiramate (TOPAMAX) 25 MG tablet Take 25 mg by mouth 2 times daily as needed.   Yes Unknown, Entered By History   traZODone (DESYREL) 50 MG tablet Take 50 mg by mouth nightly as  needed for sleep.   Yes Unknown, Entered By History   valACYclovir (VALTREX) 500 MG tablet Take 1 tablet by mouth daily. 5/7/13  Yes Maurice Cadet MD   venlafaxine (EFFEXOR XR) 75 MG 24 hr capsule Take 75 mg by mouth daily.   Yes Unknown, Entered By History   amLODIPine (NORVASC) 2.5 MG tablet Take 1 tablet by mouth daily. 11/8/12   Alia Philippe MD   [place] FOR [facility] [place] 5/4/12   Alia Philippe MD Rachal A Omana, PA-C

## 2025-05-09 NOTE — PROGRESS NOTES
Infection Prevention Progress Note  5/9/2025      Patient Name: Gloria Penn [id number]  Admit Date: 5/1/2025    Infection Status as of 5/9/2025 7:24 AM: C-difficile, MRSA  Isolation Status as of 5/9/2025 7:24 AM: Enteric, Contact     MDRO Discontinuation  Infection Prevention has reviewed this patient's chart per the MDRO D/C Policy and have taken the following action:    Patient requires one negative culture from nares prior to continuing discontinuation evaluation. Surveillance culture orders placed, date: 5/9/25    If you have any questions, please contact Infection Prevention.    Lina Fragoso, Infection Prevention

## 2025-05-09 NOTE — PROGRESS NOTES
[facility]    Medicine Progress Note - Hospitalist Service    Date of Admission:  5/1/2025    Assessment & Plan   59-year-old female with history of PAD status post right AKA, chronic kidney disease, anxiety, type 2 diabetes, hypertension, C. difficile and known coronary artery disease status post multiple PCI was admitted to ICU after STEMI and found to have severe occlusion of the LAD which was stented with 1 stent to the diagonal and 1 to the LAD.  [facility] consulted for postoperative management.     STEMI  Coronary artery disease  - Status post PCI to prox LAD and Ist diagonal branch on 05/01  - Management per cardiac team.  Also has severe RCA disease which is nonoperative due to multiple comorbidities and will need to be managed medically.  - S/p Brilinta and ASA. Currently on Plavix    Acute cardiogenic pulmonary edema  Acute hypoxic respiratory failure  - Off of CPAP. On 3L NC now  - Echo- EF 25-30% with anterior, septal and apical wall akinesis, no LV thrombus  - 05/07: Cards ordered d-dimer earlier which came back elevated. Discussed with cards- The fact that CXR showing new small b/l pleural effusion and mild diffuse increase in interstitial prominence and pt's sob improving after switching from ticagrelor to plavix makes PE unlikely as the cause of sob. D-dimer is elevated likely 2/2 STEMI and iván. Pt also has severe anemia and worsening Iván which puts patient as elevated risk of renal failure with CTA and bleeding with AC. We can start with US-duplex although VTE is less likely.  -- 05/08: US-duplex b/l: No dvt. Lasix and spironolactone held due to worsening of IVÁN. Patient declined 2 gm Na and low cholesterol diet.   -- 05/09: Lasix 20 mg po bid per nephrology    Hypotension  -BP dropped to 60s/30s on 5/2. Mild improvement with 250ml fluid bolus and 25gm of albumin.   -Dobutamine gtt and NE gtt started per cardiology. NE gtt topped 5/4; Dobutamine gtt stopped as of 5/5  -Cortisol  level- normal    Type 2 diabetes:   - Hold home glipizide and metformin and start diabetic order set, most recent A1c 7.7 1-month ago.  - Patient requests regular diet  - Blood sugars are in 300s. Increased the sliding scale to medium, and add mealtime novolog 1:10    Hypertension:  - Holding home meds-Norvasc, Carvedilol, Lisinopril    History of C. difficile:   - Reports diarrhea PTA.   - Cdiff toxin B by PCR+, Cdiff GDH antigen+, Cdiff toxin-. ID consulted. No need for treatment per ID. Avoid unnecessary antibiotics per ID.     Elevated liver enzymes:   - Likely secondary to the above acute issues, will trend.  - Elevate transaminases with normal alk phos and T bili noted. Patient is s/p lap laurence (many years ago per patient)  - Trend. CT abd/pelvis- moderate fatty infiltration of the liver  - On Repatha at home. Not on statin.    REE on CKD3a  - Creatinine jumped up to 1.47 from 0.9. Patient doesn't have much muscle mass  - REE is likely due to ATN (hypotension) and IV contrast.   - Nephrology s/o 05/05  - 05/08: Cr going up. 1.42. Monitor closely while on diuretics. ARB held.  - 05/09: Nephrology consult appreciated:As more edema today, will need to restart diuretic, may need to tolerate higher creatinine to keep at a reasonable volume status. Start lasix 20mg po bid      Anemia, acute on chronic  WALESKA  -Hgb of 7.7 noted. No e/o bleeding. CT abd/pelvis- no hematoma  -The drop from 11.0-->9.2-->7.7-->7.2-->7.1. Iron panel reviewed.  -Trend. Transfuse if Hgb <7 or symptomatic. Consent in the chart  - GI consult appreciated:  Iron studies consistent with iron deficiency but likely has component of chronic disease. Recent EGD/Colonoscopy in 06/2022 for iron deficiency anemia ([place]) was negative for sources of bleeding but mentioned chronic peptic type duodenitis.   - Hemolysis labs, blood morphology, Vit B12, and folic acid ordered.   - 05/08: S/p EGD:  - Esophageal plaques were found, suspicious for  candidiasis.  "Biopsied.  - LA Grade A reflux esophagitis with no bleeding. - Congestive gastropathy. Biopsied. Recommend pantoprazole 40mg daily x 1 month for the reflux esophagitis.   - S/p 1 unit PRBC    Right RA bruise/hematoma  -Due to arterial puncture during the coronary angiogram  -Symptomatic treatment    COPD  - Not in exacerbation. C/w PTA inhaler Advair Diskus >> Breo Ellipta inpt.    Hypothyroidism: Resumed Levothyroxine  Mood disorder: Depression, anxiety.  Continue home Buspar, Topamax, Desyrel, and Seroquel  Peripheral neuropathy: Resumed home meds, is on multiple medications including gabapentin, Voltaren, Robaxin and Lidoderm patch  Peripheral vascular disease status post right AKA    PT/OT: Recommended TCU          Diet: Snacks/Supplements Adult: Magic Cup; With Meals  Combination Diet Moderate Consistent Carb (60 g CHO per Meal) Diet    DVT Prophylaxis: Pneumatic Compression Devices  Bear Catheter: Not present  Lines: PRESENT      PICC 05/02/25 Triple Lumen Right Brachial vein medial Vasopressor,access-Site Assessment: WDL      Cardiac Monitoring: None  Code Status: Full Code      Clinically Significant Risk Factors         # Hyponatremia: Lowest Na = 131 mmol/L in last 2 days, will monitor as appropriate             # Hypertension: Noted on problem list  # Chronic heart failure with reduced ejection fraction: last echo with EF <40%          # DMII: A1C = 7.7 % (Ref range: <5.7 %) within past 6 months   # Overweight: Estimated body mass index is 27.4 kg/m  as calculated from the following:    Height as of this encounter: 1.651 m (5' 5\").    Weight as of this encounter: 74.7 kg (164 lb 10.9 oz).      # Asthma: noted on problem list        Social Drivers of Health    Depression: At risk (11/29/2024)    Received from [employer] [facility]    PHQ-2     PHQ-2 TOTAL SCORE: 4   Housing Stability: High Risk (5/5/2025)    Housing Stability     Do you have housing? : Yes     Are you worried about " losing your housing?: Yes   Tobacco Use: High Risk (5/8/2025)    Patient History     Smoking Tobacco Use: Every Day     Smokeless Tobacco Use: Never   Physical Activity: Inactive (3/3/2023)    Received from [facility]    Exercise Vital Sign     Days of Exercise per Week: 0 days     Minutes of Exercise per Session: 0 min   Stress: Stress Concern Present (3/3/2023)    Received from [facility]    [employer] - Occupational Stress Questionnaire     Feeling of Stress : To some extent   Social Connections: Socially Integrated (12/20/2024)    Received from [facility]    Social Connections     Do you often feel lonely or isolated from those around you?: 0   Recent Concern: Social Connections - Socially Isolated (11/29/2024)    Received from [employer]    Social [employer]     Do you often feel lonely or isolated from those around you?: 4          Disposition Plan     Medically Ready for Discharge: Anticipated in 2-4 Days             Ana Laura Bullock MD  Hospitalist Service  [facility]  Securely message with [employer] (more info)  Text page via [employer] Paging/Directory   ______________________________________________________________________    Interval History   Patient is seen and examined at bedside.   No complaints.  Plan of care discussed with patient. All questions answered. Pt verbalized understanding.     Physical Exam   Vital Signs: Temp: 98.4  F (36.9  C) Temp src: Oral BP: 107/63 Pulse: 83   Resp: 18 SpO2: (!) 91 % O2 Device: Nasal cannula Oxygen Delivery: 2 LPM  Weight: 164 lbs 10.94 oz    GEN: Alert and oriented. Not in acute distress.  HEENT: Atraumatic, mucous membrane- moist and pink.  Chest: Bilateral air entry.  CVS: S1S2 regular.   Abdomen: Soft. Non-tender, non-distended. No organomegaly. No guarding or rigidity. Bowel sounds active.   Extremities: NRight AKA. Right forearm swelling and  bruise. RLE +edema  CNS: No involuntary movements.  Skin: no cyanosis or clubbing.     Medical Decision Making       51 MINUTES SPENT BY ME on the date of service doing chart review, history, exam, documentation & further activities per the note.      Data

## 2025-05-10 LAB
ANION GAP SERPL CALCULATED.3IONS-SCNC: 11 MMOL/L (ref 7–15)
BASOPHILS # BLD AUTO: 0.1 10E3/UL (ref 0–0.2)
BASOPHILS NFR BLD AUTO: 1 %
BUN SERPL-MCNC: 38.8 MG/DL (ref 8–23)
CALCIUM SERPL-MCNC: 8.9 MG/DL (ref 8.8–10.4)
CHLORIDE SERPL-SCNC: 101 MMOL/L (ref 98–107)
CREAT SERPL-MCNC: 1.41 MG/DL (ref 0.51–0.95)
EGFRCR SERPLBLD CKD-EPI 2021: 42 ML/MIN/1.73M2
EOSINOPHIL # BLD AUTO: 0.1 10E3/UL (ref 0–0.7)
EOSINOPHIL NFR BLD AUTO: 1 %
ERYTHROCYTE [DISTWIDTH] IN BLOOD BY AUTOMATED COUNT: 16.7 % (ref 10–15)
GLUCOSE BLDC GLUCOMTR-MCNC: 158 MG/DL (ref 70–99)
GLUCOSE BLDC GLUCOMTR-MCNC: 262 MG/DL (ref 70–99)
GLUCOSE BLDC GLUCOMTR-MCNC: 298 MG/DL (ref 70–99)
GLUCOSE BLDC GLUCOMTR-MCNC: 86 MG/DL (ref 70–99)
GLUCOSE BLDC GLUCOMTR-MCNC: 99 MG/DL (ref 70–99)
GLUCOSE SERPL-MCNC: 80 MG/DL (ref 70–99)
HCO3 SERPL-SCNC: 23 MMOL/L (ref 22–29)
HCT VFR BLD AUTO: 25.7 % (ref 35–47)
HGB BLD-MCNC: 8 G/DL (ref 11.7–15.7)
IMM GRANULOCYTES # BLD: 0.3 10E3/UL
IMM GRANULOCYTES NFR BLD: 2 %
LYMPHOCYTES # BLD AUTO: 0.8 10E3/UL (ref 0.8–5.3)
LYMPHOCYTES NFR BLD AUTO: 7 %
MAGNESIUM SERPL-MCNC: 1.7 MG/DL (ref 1.7–2.3)
MCH RBC QN AUTO: 24 PG (ref 26.5–33)
MCHC RBC AUTO-ENTMCNC: 31.1 G/DL (ref 31.5–36.5)
MCV RBC AUTO: 77 FL (ref 78–100)
MONOCYTES # BLD AUTO: 1.1 10E3/UL (ref 0–1.3)
MONOCYTES NFR BLD AUTO: 9 %
NEUTROPHILS # BLD AUTO: 9.4 10E3/UL (ref 1.6–8.3)
NEUTROPHILS NFR BLD AUTO: 80 %
NRBC # BLD AUTO: 0.1 10E3/UL
NRBC BLD AUTO-RTO: 1 /100
PLATELET # BLD AUTO: 418 10E3/UL (ref 150–450)
POTASSIUM SERPL-SCNC: 4.1 MMOL/L (ref 3.4–5.3)
RBC # BLD AUTO: 3.34 10E6/UL (ref 3.8–5.2)
SODIUM SERPL-SCNC: 135 MMOL/L (ref 135–145)
WBC # BLD AUTO: 11.7 10E3/UL (ref 4–11)

## 2025-05-10 PROCEDURE — 250N000013 HC RX MED GY IP 250 OP 250 PS 637: Performed by: INTERNAL MEDICINE

## 2025-05-10 PROCEDURE — 250N000013 HC RX MED GY IP 250 OP 250 PS 637: Performed by: STUDENT IN AN ORGANIZED HEALTH CARE EDUCATION/TRAINING PROGRAM

## 2025-05-10 PROCEDURE — 82310 ASSAY OF CALCIUM: CPT | Performed by: INTERNAL MEDICINE

## 2025-05-10 PROCEDURE — 93010 ELECTROCARDIOGRAM REPORT: CPT | Performed by: INTERNAL MEDICINE

## 2025-05-10 PROCEDURE — 250N000011 HC RX IP 250 OP 636: Performed by: INTERNAL MEDICINE

## 2025-05-10 PROCEDURE — 99233 SBSQ HOSP IP/OBS HIGH 50: CPT | Performed by: STUDENT IN AN ORGANIZED HEALTH CARE EDUCATION/TRAINING PROGRAM

## 2025-05-10 PROCEDURE — 93005 ELECTROCARDIOGRAM TRACING: CPT

## 2025-05-10 PROCEDURE — 99232 SBSQ HOSP IP/OBS MODERATE 35: CPT | Performed by: INTERNAL MEDICINE

## 2025-05-10 PROCEDURE — 120N000004 HC R&B MS OVERFLOW

## 2025-05-10 PROCEDURE — 85025 COMPLETE CBC W/AUTO DIFF WBC: CPT | Performed by: STUDENT IN AN ORGANIZED HEALTH CARE EDUCATION/TRAINING PROGRAM

## 2025-05-10 PROCEDURE — 83735 ASSAY OF MAGNESIUM: CPT | Performed by: STUDENT IN AN ORGANIZED HEALTH CARE EDUCATION/TRAINING PROGRAM

## 2025-05-10 RX ORDER — FLUCONAZOLE 200 MG/1
400 TABLET ORAL ONCE
Status: COMPLETED | OUTPATIENT
Start: 2025-05-10 | End: 2025-05-10

## 2025-05-10 RX ORDER — FUROSEMIDE 40 MG/1
40 TABLET ORAL
Status: DISCONTINUED | OUTPATIENT
Start: 2025-05-10 | End: 2025-05-11

## 2025-05-10 RX ORDER — FLUCONAZOLE 200 MG/1
200 TABLET ORAL DAILY
Status: DISCONTINUED | OUTPATIENT
Start: 2025-05-11 | End: 2025-05-12 | Stop reason: HOSPADM

## 2025-05-10 RX ADMIN — CLOPIDOGREL 75 MG: 75 TABLET ORAL at 21:03

## 2025-05-10 RX ADMIN — BUPROPION HYDROCHLORIDE 300 MG: 300 TABLET, EXTENDED RELEASE ORAL at 09:11

## 2025-05-10 RX ADMIN — BUSPIRONE HYDROCHLORIDE 5 MG: 5 TABLET ORAL at 13:05

## 2025-05-10 RX ADMIN — GUAIFENESIN AND DEXTROMETHORPHAN 10 ML: 100; 10 SYRUP ORAL at 15:50

## 2025-05-10 RX ADMIN — ALBUTEROL SULFATE 2 PUFF: 90 AEROSOL, METERED RESPIRATORY (INHALATION) at 16:07

## 2025-05-10 RX ADMIN — GABAPENTIN 300 MG: 300 CAPSULE ORAL at 13:05

## 2025-05-10 RX ADMIN — PANTOPRAZOLE SODIUM 40 MG: 40 TABLET, DELAYED RELEASE ORAL at 06:54

## 2025-05-10 RX ADMIN — GABAPENTIN 300 MG: 300 CAPSULE ORAL at 21:03

## 2025-05-10 RX ADMIN — FLUTICASONE FUROATE AND VILANTEROL TRIFENATATE 1 PUFF: 200; 25 POWDER RESPIRATORY (INHALATION) at 09:10

## 2025-05-10 RX ADMIN — LEVOTHYROXINE SODIUM 125 MCG: 0.12 TABLET ORAL at 06:21

## 2025-05-10 RX ADMIN — QUETIAPINE FUMARATE 25 MG: 25 TABLET ORAL at 21:04

## 2025-05-10 RX ADMIN — TRAZODONE HYDROCHLORIDE 50 MG: 50 TABLET ORAL at 21:04

## 2025-05-10 RX ADMIN — METOPROLOL SUCCINATE 12.5 MG: 25 TABLET, EXTENDED RELEASE ORAL at 09:12

## 2025-05-10 RX ADMIN — INSULIN ASPART 3 UNITS: 100 INJECTION, SOLUTION INTRAVENOUS; SUBCUTANEOUS at 09:21

## 2025-05-10 RX ADMIN — INSULIN ASPART 4 UNITS: 100 INJECTION, SOLUTION INTRAVENOUS; SUBCUTANEOUS at 13:00

## 2025-05-10 RX ADMIN — VENLAFAXINE HYDROCHLORIDE 75 MG: 75 CAPSULE, EXTENDED RELEASE ORAL at 09:12

## 2025-05-10 RX ADMIN — BUSPIRONE HYDROCHLORIDE 5 MG: 5 TABLET ORAL at 09:12

## 2025-05-10 RX ADMIN — VALACYCLOVIR HYDROCHLORIDE 500 MG: 500 TABLET, FILM COATED ORAL at 09:12

## 2025-05-10 RX ADMIN — SPIRONOLACTONE 12.5 MG: 25 TABLET, FILM COATED ORAL at 18:01

## 2025-05-10 RX ADMIN — Medication 10 MG: at 21:03

## 2025-05-10 RX ADMIN — INSULIN ASPART 2 UNITS: 100 INJECTION, SOLUTION INTRAVENOUS; SUBCUTANEOUS at 21:04

## 2025-05-10 RX ADMIN — OXYCODONE HYDROCHLORIDE 5 MG: 5 TABLET ORAL at 21:04

## 2025-05-10 RX ADMIN — FLUCONAZOLE 400 MG: 200 TABLET ORAL at 15:50

## 2025-05-10 RX ADMIN — SPIRONOLACTONE 12.5 MG: 25 TABLET, FILM COATED ORAL at 09:11

## 2025-05-10 RX ADMIN — METOPROLOL SUCCINATE 12.5 MG: 25 TABLET, EXTENDED RELEASE ORAL at 21:03

## 2025-05-10 RX ADMIN — BUSPIRONE HYDROCHLORIDE 5 MG: 5 TABLET ORAL at 21:04

## 2025-05-10 RX ADMIN — INSULIN ASPART 4 UNITS: 100 INJECTION, SOLUTION INTRAVENOUS; SUBCUTANEOUS at 18:02

## 2025-05-10 RX ADMIN — FUROSEMIDE 40 MG: 10 INJECTION, SOLUTION INTRAMUSCULAR; INTRAVENOUS at 09:13

## 2025-05-10 RX ADMIN — METHOCARBAMOL 500 MG: 500 TABLET ORAL at 21:04

## 2025-05-10 RX ADMIN — FUROSEMIDE 40 MG: 10 INJECTION, SOLUTION INTRAMUSCULAR; INTRAVENOUS at 18:01

## 2025-05-10 RX ADMIN — ALBUTEROL SULFATE 2 PUFF: 90 AEROSOL, METERED RESPIRATORY (INHALATION) at 09:10

## 2025-05-10 RX ADMIN — GABAPENTIN 300 MG: 300 CAPSULE ORAL at 09:12

## 2025-05-10 ASSESSMENT — ACTIVITIES OF DAILY LIVING (ADL)
ADLS_ACUITY_SCORE: 60
ADLS_ACUITY_SCORE: 58
ADLS_ACUITY_SCORE: 60
ADLS_ACUITY_SCORE: 58
ADLS_ACUITY_SCORE: 60
ADLS_ACUITY_SCORE: 58
ADLS_ACUITY_SCORE: 60
ADLS_ACUITY_SCORE: 58
ADLS_ACUITY_SCORE: 60
ADLS_ACUITY_SCORE: 60
ADLS_ACUITY_SCORE: 58

## 2025-05-10 NOTE — PROGRESS NOTES
"  '    RENAL ([facility]) progress note  CC: F/U REE  S: Since lst seen, Gloria had good UO overnight and edema is near her baseline. Denies orthopnea, CP, dizziness or cramping. Creatinine remains stable 1.4 mg/dl.     A/P:   REE due to ATN: Creatinine of 1.47 up from 0.99 on 5/2. Patient with recent angio and 2 stent placement(5/1)also with significant hypotension on 5/2. In the setting of CHF and ongoing diuresis.   Creatinine stable at 1.4 mg/dl. Could discharge from renal standpoint once TCU available and we can follow this as outpt    EGFR (cystatin C) = 33 ml/min on 5/9 with Cr 1.4  Daily renal function labs, wts, I/Os     2. CAD: w/ STEMI s/p angio and stent placement. Per cardiology.     3. HFpEF: setting of acute STEMI  - Good response yesterday (2.5L) to resuming Lasix 20 mg BID - continue same.    - Trend labs and daily weights.       4. Type 2 DM: per primary.     5. HTN: BP stable at present. Continue same management. Holding home lisinopril.       Awaiting TCU. If creatinine remains elevated at time of discharge , will set up follow up at [facility] (pt agreeable)    Albert Babcock MD  [facility]  [phone] (off)          No interval changes to past medical history, social history or family history to report.    /70 (BP Location: Left arm)   Pulse 78   Temp 98.3  F (36.8  C) (Oral)   Resp 16   Ht 1.651 m (5' 5\")   Wt 73.8 kg (162 lb 11.2 oz)   LMP 11/23/2012   SpO2 96%   BMI 27.07 kg/m      I/O last 3 completed shifts:  In: 840 [P.O.:840]  Out: 3100 [Urine:3100]    Physical Exam:   GENERAL: NAD, alert  EYES: No scleral icterus, conjunctiva clear  ENT: Hearing normal, MMM  RESP: CTA ant, no resp distress.   CV: RRR, trace leg edema.    GI: Soft, NT/ND, BS active  Musculoskeletal: right leg AKA    SKIN: No rash  PSYCH:  Appropriate mood and affect    Recent Labs   Lab 05/10/25  0621 05/09/25  0512 05/08/25  0602 05/07/25  0614 05/06/25  0416 05/05/25  0420 05/04/25  0415   PRINCESS " 135 131* 132* 131* 131* 135 134*   POTASSIUM 4.1 4.6 4.5 4.5 4.7 4.4 4.2   CHLORIDE 101 99 98 98 99 103 100   CO2 23 22 22 23 23 25 21*   BUN 38.8* 36.0* 35.8* 29.0* 24.6* 19.6 26.6*   CR 1.41* 1.43* 1.42* 1.37* 1.32* 0.92 1.10*   GFRESTIMATED 42* 42* 42* 44* 46* 71 58*   LESLY 8.9 8.1* 8.2* 8.3* 8.2* 8.1* 8.4*   MAG 1.7 1.8 1.8 1.7 1.8 1.8 1.9       Recent Labs   Lab 05/10/25  0621 05/09/25  0512 05/08/25  1734 05/08/25  0602 05/07/25  1114 05/07/25  0614 05/06/25  0952 05/05/25  0420   WBC 11.7* 10.8  --  10.2 10.9 11.4* 12.5* 11.2*   HGB 8.0* 7.9* 7.9* 6.8* 7.1* 7.0* 7.3* 7.3*   HCT 25.7* 26.1*  --  22.8* 24.0* 23.4* 24.8* 24.3*   MCV 77* 77*  --  76* 76* 76* 76* 76*    401  --  329 331 315 300 253             Current Facility-Administered Medications:     albuterol (PROVENTIL HFA/VENTOLIN HFA) inhaler, 2 puff, Inhalation, Q4H PRN, Candice Kline MD, 2 puff at 05/07/25 1846    buprenorphine (BUTRANS) 5 MCG/HR WK patch 1 patch, 1 patch, Transdermal, Weekly **AND** buprenorphine (BUTRANS) Patch in Place, , Transdermal, Q8H [facility]    buPROPion (WELLBUTRIN XL) 24 hr tablet 300 mg, 300 mg, Oral, QAMEliud Catherine Leja, MD, 300 mg at 05/09/25 0825    busPIRone (BUSPAR) tablet 5 mg, 5 mg, Oral, TID, Candice Kline MD, 5 mg at 05/09/25 2030    clopidogrel (PLAVIX) tablet 75 mg, 75 mg, Oral, Daily, Candice Kline MD, 75 mg at 05/09/25 2030    Continuing beta blocker from home medication list OR beta blocker order already placed during this visit, , Does not apply, DOES NOT GO TO MAR, Candice Kline MD    glucose gel 15-30 g, 15-30 g, Oral, Q15 Min PRN **OR** dextrose 50 % injection 25-50 mL, 25-50 mL, Intravenous, Q15 Min PRN **OR** glucagon injection 1 mg, 1 mg, Subcutaneous, Q15 Min PRN, Candice Kline MD    diclofenac (VOLTAREN) 1 % topical gel 2 g, 2 g, Topical, 4x Daily PRN, Candice Kline MD    fluticasone-vilanterol (BREO  ELLIPTA) 200-25 MCG/ACT inhaler 1 puff, 1 puff, Inhalation, Daily, Candice Kline MD, 1 puff at 05/09/25 0820    furosemide (LASIX) injection 40 mg, 40 mg, Intravenous, BID, Satya Marie MD, 40 mg at 05/09/25 1620    [Held by provider] furosemide (LASIX) tablet 20 mg, 20 mg, Oral, BID, Jerzy Hanna MD, 20 mg at 05/09/25 1213    gabapentin (NEURONTIN) capsule 300 mg, 300 mg, Oral, TID, Candice Kline MD, 300 mg at 05/09/25 2029    guaiFENesin-dextromethorphan (ROBITUSSIN DM) 100-10 MG/5ML syrup 10 mL, 10 mL, Oral, Q4H PRN, Candice Kline MD, 10 mL at 05/09/25 2035    hydrALAZINE (APRESOLINE) injection 10 mg, 10 mg, Intravenous, Q4H PRN, Candice Kline MD    insulin aspart (NovoLOG) injection (RAPID ACTING), 1-7 Units, Subcutaneous, TID AC, Candice Kline MD, 3 Units at 05/08/25 1727    insulin aspart (NovoLOG) injection (RAPID ACTING), 1-5 Units, Subcutaneous, At Bedtime, Candice Kline MD, 2 Units at 05/08/25 2134    insulin aspart (NovoLOG) injection (RAPID ACTING), , Subcutaneous, TID w/meals, Candice Kline MD, 8 Units at 05/09/25 1854    insulin glargine (LANTUS PEN) injection 25 Units, 25 Units, Subcutaneous, At Bedtime, Candice Kline MD, 25 Units at 05/09/25 2205    ipratropium - albuterol 0.5 mg/2.5 mg/3 mL (DUONEB) neb solution 3 mL, 1 vial, Nebulization, Q6H PRN, Candice Kline MD, 3 mL at 05/08/25 2146    levothyroxine (SYNTHROID/LEVOTHROID) tablet 125 mcg, 125 mcg, Oral, QAM AC, Candice Kline MD, 125 mcg at 05/10/25 0621    Lidocaine (LIDOCARE) 4 % Patch 1 patch, 1 patch, Transdermal, Q24H, Candice Kline MD, 1 patch at 05/04/25 1010    melatonin tablet 10 mg, 10 mg, Oral, At Bedtime, Candice Kline MD, 10 mg at 05/09/25 2029    [Held by provider] metFORMIN (GLUCOPHAGE XR) 24 hr tablet 1,000 mg, 1,000 mg, Oral, BID w/meals, Tej Weller MD    methocarbamol (ROBAXIN)  tablet 500 mg, 500 mg, Oral, Q6H PRN, Candice Kline MD, 500 mg at 05/08/25 2137    metoprolol (LOPRESSOR) injection 5 mg, 5 mg, Intravenous, Q15 Min PRN, Candice Kline MD    metoprolol succinate ER (TOPROL-XL) 24 hr half-tab 12.5 mg, 12.5 mg, Oral, BID, Candice Kline MD, 12.5 mg at 05/09/25 2030    naloxone (NARCAN) injection 0.2 mg, 0.2 mg, Intravenous, Q2 Min PRN **OR** naloxone (NARCAN) injection 0.4 mg, 0.4 mg, Intravenous, Q2 Min PRN **OR** naloxone (NARCAN) injection 0.2 mg, 0.2 mg, Intramuscular, Q2 Min PRN **OR** naloxone (NARCAN) injection 0.4 mg, 0.4 mg, Intramuscular, Q2 Min PRN, Candice Kline MD    nitroGLYcerin (NITROSTAT) sublingual tablet 0.4 mg, 0.4 mg, Sublingual, Q5 Min PRN, Candice Kline MD    ondansetron (ZOFRAN ODT) ODT tab 4 mg, 4 mg, Oral, Q6H PRN **OR** ondansetron (ZOFRAN) injection 4 mg, 4 mg, Intravenous, Q6H PRN, Candice Kline MD, 4 mg at 05/07/25 2049    oxyCODONE (ROXICODONE) tablet 5 mg, 5 mg, Oral, Q4H PRN, 5 mg at 05/08/25 2137 **OR** oxyCODONE (ROXICODONE) tablet 10 mg, 10 mg, Oral, Q4H PRN, Candice Kline MD    pantoprazole (PROTONIX) EC tablet 40 mg, 40 mg, Oral, QAM AC, Candice Kline MD, 40 mg at 05/10/25 0654    Percutaneous Coronary Intervention orders placed (this is information for BPA alerting), , Does not apply, DOES NOT GO TO Eliud LYNCH Catherine Leja, MD    QUEtiapine (SEROquel) tablet 25 mg, 25 mg, Oral, At Bedtime, Candice Kline MD, 25 mg at 05/09/25 2030    sodium chloride (PF) 0.9% PF flush 10-20 mL, 10-20 mL, Intracatheter, q1 min prn, Candice Kline MD    sodium chloride (PF) 0.9% PF flush 10-40 mL, 10-40 mL, Intracatheter, Once PRN, Candice Kline MD    sodium chloride (PF) 0.9% PF flush 10-40 mL, 10-40 mL, Intracatheter, Q7 Days, Candice Kline MD, 30 mL at 05/09/25 2153    sodium chloride (PF) 0.9% PF flush 10-40 mL, 10-40 mL, Intracatheter,  Daily, Candice Kline MD, 30 mL at 05/09/25 0826    spironolactone (ALDACTONE) half-tab 12.5 mg, 12.5 mg, Oral, BID, Sowmya Lennon PA-C, 12.5 mg at 05/09/25 1734    topiramate (TOPAMAX) tablet 25 mg, 25 mg, Oral, BID PRN, Candice Kline MD    traZODone (DESYREL) tablet 50 mg, 50 mg, Oral, At Bedtime PRN, Candice Kline MD, 50 mg at 05/09/25 2036    valACYclovir (VALTREX) tablet 500 mg, 500 mg, Oral, Daily, Candice Kline MD, 500 mg at 05/09/25 0819    venlafaxine (EFFEXOR XR) 24 hr capsule 75 mg, 75 mg, Oral, Daily, Candice Kline MD, 75 mg at 05/09/25 0819      Labs personally reviewed today during this evaluation at 10:29 AM

## 2025-05-10 NOTE — PROGRESS NOTES
Care Management Follow Up    Length of Stay (days): 9    Expected Discharge Date: 05/12/2025    Anticipated Discharge Plan:   TCU     Transportation: Anticipate medical transport    PT Recommendations: Transitional Care Facility  OT Recommendations:  Transitional Care Facility     Barriers to Discharge: medical stability, IV lasix    Prior Living Situation: apartment with alone    Discussed  Partnership in Safe Discharge Planning  document with patient/family: No     Handoff Completed: No, handoff not indicated or clinically appropriate    Patient/Spokesperson Updated: No    Additional Information:    Chart review, discussed with hospitalist.  Patient not medically stable to discharge, remains on IV lasix at this time.  Reviewed, she is accepted to [facility]CU when ready to discharge.     Next Steps:     Continue to follow to assist with discharge needs.  Will need to schedule transportation per need, request, as well as complete PAS.    MARINA JacquesW

## 2025-05-10 NOTE — PLAN OF CARE
Problem: Glycemic Control Impaired  Goal: Blood Glucose Level Within Targeted Range  Outcome: Progressing     Problem: Gas Exchange Impaired  Goal: Optimal Gas Exchange  Outcome: Progressing     Goal Outcome Evaluation:  A/O x4, tolerating Room Air, gets SOB when lying flat in bed kept 2L NC Sp02 low 90's. Diminished breath sounds. CXR done. BG- 118, 197.   Up to BSC Ax4 to void, no BM. On IV Lasix.  PICC intact. Denied pain. PRN Robitussin DM for cough and Trazodone for sleep.

## 2025-05-10 NOTE — PROGRESS NOTES
"Imp/plan:  ST MI s/p PCI to LAD - on clopidogrel complicated by anemia hgb as low 6.8, today 8.0  HFrEF ischemic with EF 20-25%, on furosemide change to po today, metoprolol 12.5mg bid, spironolacton 12.5mg bid,Cr down 1.41 today  with K 4.1    Review of Systems: 12 points negative other than above    /64 (BP Location: Left arm)   Pulse 74   Temp 98.3  F (36.8  C) (Oral)   Resp 16   Ht 1.651 m (5' 5\")   Wt 73.8 kg (162 lb 11.2 oz)   LMP 11/23/2012   SpO2 94%   BMI 27.07 kg/m    JVP<7cm, carotids normal  Lungs clear  Cor RRR no c,r,m  Abs soft +BS, no mass  Ext no c,c,e    Lab Results   Component Value Date    HGB 8.0 (L) 05/10/2025     Lab Results   Component Value Date     05/10/2025     No results found for: \"CREATININE\"  No components found for: \"K\"          Satya Marie MD  Interventional Cardiology   [facility]  [phone]    "

## 2025-05-10 NOTE — PROGRESS NOTES
[facility]    Medicine Progress Note - Hospitalist Service    Date of Admission:  5/1/2025    Assessment & Plan   59-year-old female with history of PAD status post right AKA, chronic kidney disease, anxiety, type 2 diabetes, hypertension, C. difficile and known coronary artery disease status post multiple PCI was admitted to ICU after STEMI and found to have severe occlusion of the LAD which was stented with 1 stent to the diagonal and 1 to the LAD.  [facility] consulted for postoperative management.  -- On IV Lasix for acute pulmonary edema. Acute hypoxic respiratory failure resolving.  -- Esophageal candidiasis. Fluconazole started. Monitor LFTs and QTc  -- Dispo: Likely to TCU pending on Monday pending final cards recommendation.     STEMI  Coronary artery disease  - Status post PCI to prox LAD and Ist diagonal branch on 05/01  - Management per cardiac team.  Also has severe RCA disease which is nonoperative due to multiple comorbidities and will need to be managed medically.  - S/p Brilinta and ASA. Currently on Plavix    Acute hypoxic respiratory failure-improving  Acute cardiogenic pulmonary edema-resolved  HFrEF exacerbation  Ischemic cardiomyopathy  - Off of CPAP. On 1L NC now. Wean as able  - Echo- EF 25-30% with anterior, septal and apical wall akinesis, no LV thrombus  - 05/07: Cards ordered d-dimer earlier which came back elevated. Discussed with cards- The fact that CXR showing new small b/l pleural effusion and mild diffuse increase in interstitial prominence and pt's sob improving after switching from ticagrelor to plavix makes PE unlikely as the cause of sob. D-dimer is elevated likely 2/2 STEMI and ree. Pt also has severe anemia and worsening REE which puts patient as elevated risk of renal failure with CTA and bleeding with AC. We can start with US-duplex although VTE is less likely.  -- 05/08: US-duplex b/l: No dvt. Lasix and spironolactone held due to worsening of REE. Patient  declined 2 gm Na and low cholesterol diet.   -- 05/09: Lasix 20 mg po bid per nephrology which was changed to iv per cards. Spironolactone also added. Discussed with Nephrology- would tolerate mild REE to keep volume down.      Hypotension-resolved  -BP dropped to 60s/30s on 5/2. Mild improvement with 250ml fluid bolus and 25gm of albumin.   -Dobutamine gtt and NE gtt started per cardiology. NE gtt topped 5/4; Dobutamine gtt stopped as of 5/5  -Cortisol level- normal    Type 2 diabetes:   - Hold home glipizide and metformin and start diabetic order set, most recent A1c 7.7 1-month ago.  - Patient requests regular diet  - Accu-checks, Medium sliding scale, mealtime novolog 1:10. Lantus reduced to 20 units due to low BS in am, Hypoglycemia protocol    Hypertension:  - Holding home meds-Norvasc, Carvedilol, Lisinopril  - Toprol XL 12.5 bid    History of C. difficile:   - Reports diarrhea PTA.   - Cdiff toxin B by PCR+, Cdiff GDH antigen+, Cdiff toxin-. ID consulted. No need for treatment per ID. Avoid unnecessary antibiotics per ID.     Elevated liver enzymes:   - Likely secondary to the above acute issues, will trend.  - Elevate transaminases with normal alk phos and T bili noted. Patient is s/p lap laurence (many years ago per patient)  - Trend. CT abd/pelvis- moderate fatty infiltration of the liver  - On Repatha at home. Not on statin.  - Monitor while on fluconazole. Check in am    REE on CKD3a  - Creatinine jumped up to 1.47 from 0.9. Patient doesn't have much muscle mass  - REE is likely due to ATN (hypotension) and IV contrast.   - Nephrology s/o 05/05  - 05/08: Cr going up. 1.42. Monitor closely while on diuretics. ARB held.  - 05/09: Nephrology: may need to tolerate higher creatinine to keep at a reasonable volume status.   - 05/10: Cr stable around 1.4      Anemia, acute on chronic  [place]ALESKA  -Hgb of 7.7 noted. No e/o bleeding. CT abd/pelvis- no hematoma  -The drop from 11.0-->9.2-->7.7-->7.2-->7.1. Iron panel  reviewed.  -Trend. Transfuse if Hgb <7 or symptomatic. Consent in the chart  - GI consult appreciated:  Iron studies consistent with iron deficiency but likely has component of chronic disease. Recent EGD/Colonoscopy in 06/2022 for iron deficiency anemia ([place]) was negative for sources of bleeding but mentioned chronic peptic type duodenitis.   - Hemolysis labs, blood morphology, Vit B12, and folic acid ordered.   - 05/08: S/p EGD:  - Esophageal plaques were found, suspicious for  candidiasis. Biopsied.  - LA Grade A reflux esophagitis with no bleeding. - Congestive gastropathy. Biopsied. Recommend pantoprazole 40mg daily x 1 month for the reflux esophagitis.   - S/p 1 unit PRBC    Candidal esophagitis  - EGD as above  - Esophagus biopsy: Acute esophagitis with superficially associated fungal elements and prominent reactive epithelial changes  -Superficially associated fungal elements are identified on routine stains.  - 05/10: Check QT on EKG before starting Fluconazole. Monitor LFTs. Check in am  - QTc 405. Monitor on telemetry    Right RA bruise/hematoma  -Due to arterial puncture during the coronary angiogram  -Symptomatic treatment    COPD  - Not in exacerbation. C/w PTA inhaler Advair Diskus >> Breo Ellipta inpt.    Hypothyroidism: Resumed Levothyroxine  Mood disorder: Depression, anxiety.  Continue home Buspar, Topamax, Desyrel, and Seroquel  Peripheral neuropathy: Resumed home meds, is on multiple medications including gabapentin, Voltaren, Robaxin and Lidoderm patch  Peripheral vascular disease status post right AKA    PT/OT: Recommended TCU          Diet: Snacks/Supplements Adult: Magic Cup; With Meals  Combination Diet Moderate Consistent Carb (60 g CHO per Meal) Diet    DVT Prophylaxis: Pneumatic Compression Devices  Bear Catheter: Not present  Lines: PRESENT      PICC 05/02/25 Triple Lumen Right Brachial vein medial Vasopressor,access-Site Assessment: WDL      Cardiac Monitoring: None  Code Status:  "Full Code      Clinically Significant Risk Factors         # Hyponatremia: Lowest Na = 131 mmol/L in last 2 days, will monitor as appropriate             # Hypertension: Noted on problem list  # Acute heart failure with reduced ejection fraction: last echo with EF <40% and receiving IV diuretics          # DMII: A1C = 7.7 % (Ref range: <5.7 %) within past 6 months   # Overweight: Estimated body mass index is 27.07 kg/m  as calculated from the following:    Height as of this encounter: 1.651 m (5' 5\").    Weight as of this encounter: 73.8 kg (162 lb 11.2 oz).      # Asthma: noted on problem list        Social Drivers of Health    Depression: At risk (11/29/2024)    Received from [facility]    PHQ-2     PHQ-2 TOTAL SCORE: 4   Housing Stability: High Risk (5/5/2025)    Housing Stability     Do you have housing? : Yes     Are you worried about losing your housing?: Yes   Tobacco Use: High Risk (5/8/2025)    Patient History     Smoking Tobacco Use: Every Day     Smokeless Tobacco Use: Never   Physical Activity: Inactive (3/3/2023)    Received from [facility]    Exercise Vital Sign     Days of Exercise per Week: 0 days     Minutes of Exercise per Session: 0 min   Stress: Stress Concern Present (3/3/2023)    Received from [facility]    [place] [employer] - Occupational Stress Questionnaire     Feeling of Stress : To some extent   Social Connections: Socially Integrated (12/20/2024)    Received from [employer]    Social Connections     Do you often feel lonely or isolated from those around you?: 0   Recent Concern: Social Connections - Socially Isolated (11/29/2024)    Received from [employer]    Social Connections     Do you often feel lonely or isolated from those around you?: 4          Disposition Plan     Medically Ready for Discharge: Anticipated in 2-4 Days             Ana Laura Bullock" MD  Hospitalist Service  [facility]  Securely message with [employer] (more info)  Text page via [employer] Paging/Directory   ______________________________________________________________________    Interval History   Patient is seen and examined at bedside.   No complaints.  Plan of care discussed with patient. All questions answered. Pt verbalized understanding.     Physical Exam   Vital Signs: Temp: 98.3  F (36.8  C) Temp src: Oral BP: 112/64 Pulse: 74   Resp: 16 SpO2: 94 % O2 Device: None (Room air) Oxygen Delivery: 1 LPM  Weight: 162 lbs 11.19 oz    GEN: Alert and oriented. Not in acute distress.  HEENT: Atraumatic, mucous membrane- moist and pink.  Chest: Bilateral air entry.  CVS: S1S2 regular.   Abdomen: Soft. Non-tender, non-distended. No organomegaly. No guarding or rigidity. Bowel sounds active.   Extremities: Right AKA. Right forearm swelling and bruise. LLE +edema  CNS: No involuntary movements.  Skin: no cyanosis or clubbing.     Medical Decision Making       51 MINUTES SPENT BY ME on the date of service doing chart review, history, exam, documentation & further activities per the note.      Data

## 2025-05-10 NOTE — PLAN OF CARE
Problem: Adult Inpatient Plan of Care  Goal: Absence of Hospital-Acquired Illness or Injury  Intervention: Identify and Manage Fall Risk  Recent Flowsheet Documentation  Taken 5/10/2025 1306 by Puja Rivera RN  Safety Promotion/Fall Prevention: activity supervised  Taken 5/10/2025 0900 by Puja Rivera RN  Safety Promotion/Fall Prevention: activity supervised  Intervention: Prevent Skin Injury  Recent Flowsheet Documentation  Taken 5/10/2025 1306 by Puja Rivera RN  Body Position: position changed independently  Taken 5/10/2025 0900 by Puja Rivera RN  Body Position: position changed independently  Intervention: Prevent Infection  Recent Flowsheet Documentation  Taken 5/10/2025 1306 by Puja Rivera RN  Infection Prevention:   hand hygiene promoted   equipment surfaces disinfected  Taken 5/10/2025 0900 by Puja Rivera RN  Infection Prevention:   hand hygiene promoted   equipment surfaces disinfected  Goal: Optimal Comfort and Wellbeing  Intervention: Provide Person-Centered Care  Recent Flowsheet Documentation  Taken 5/10/2025 1306 by Puja Rivera RN  Trust Relationship/Rapport:   care explained   choices provided   thoughts/feelings acknowledged   emotional support provided   empathic listening provided   questions answered   questions encouraged   reassurance provided  Taken 5/10/2025 0900 by Puja Rivera RN  Trust Relationship/Rapport:   care explained   choices provided   thoughts/feelings acknowledged   emotional support provided   empathic listening provided   questions answered   questions encouraged   reassurance provided     Problem: Cardiac Catheterization (Diagnostic/Interventional)  Goal: Absence of Contrast-Induced Injury  Intervention: Prevent Systemic Contrast Dye Reaction  Recent Flowsheet Documentation  Taken 5/10/2025 1306 by Puja Rivera RN  Stabilization Measures: verbal stimulation provided  Taken 5/10/2025 0900 by Puja Rivera RN  Stabilization Measures: verbal stimulation  provided  Goal: Anesthesia/Sedation Recovery  Intervention: Optimize Anesthesia Recovery  Recent Flowsheet Documentation  Taken 5/10/2025 1306 by Puja Rivera RN  Safety Promotion/Fall Prevention: activity supervised  Reorientation Measures:   calendar in view   clock in view  Taken 5/10/2025 0900 by Puja Rivera RN  Safety Promotion/Fall Prevention: activity supervised  Reorientation Measures:   calendar in view   clock in view  Goal: Absence of Vascular Access Complication  Intervention: Prevent and Manage Access Complications  Recent Flowsheet Documentation  Taken 5/10/2025 1306 by Puja Rivera RN  Bleeding Precautions: blood pressure closely monitored  Activity Management: activity adjusted per tolerance  Infection Management: aseptic technique maintained  Head of Bed (HOB) Positioning: HOB at 20-30 degrees  Taken 5/10/2025 0900 by Puja Rivera RN  Bleeding Precautions: blood pressure closely monitored  Activity Management: activity adjusted per tolerance  Infection Management: aseptic technique maintained  Head of Bed (HOB) Positioning: HOB at 20-30 degrees     Problem: Cardiac Catheterization (Diagnostic/Interventional)  Goal: Absence of Bleeding  Outcome: Progressing  Goal: Absence of Contrast-Induced Injury  Outcome: Progressing  Intervention: Prevent Systemic Contrast Dye Reaction  Recent Flowsheet Documentation  Taken 5/10/2025 1306 by Puja Rivera RN  Stabilization Measures: verbal stimulation provided  Taken 5/10/2025 0900 by Puja Rivera RN  Stabilization Measures: verbal stimulation provided  Goal: Stable Heart Rate and Rhythm  Outcome: Progressing  Goal: Absence of Embolism Signs and Symptoms  Outcome: Progressing  Goal: Anesthesia/Sedation Recovery  Outcome: Progressing  Intervention: Optimize Anesthesia Recovery  Recent Flowsheet Documentation  Taken 5/10/2025 1306 by Puja Rivera RN  Safety Promotion/Fall Prevention: activity supervised  Reorientation Measures:   calendar in view   clock in  view  Taken 5/10/2025 0900 by Puja Rivera RN  Safety Promotion/Fall Prevention: activity supervised  Reorientation Measures:   calendar in view   clock in view  Goal: Optimal Pain Control and Function  Outcome: Progressing  Goal: Absence of Vascular Access Complication  Outcome: Progressing  Intervention: Prevent and Manage Access Complications  Recent Flowsheet Documentation  Taken 5/10/2025 1306 by Puja Rivera RN  Bleeding Precautions: blood pressure closely monitored  Activity Management: activity adjusted per tolerance  Infection Management: aseptic technique maintained  Head of Bed (HOB) Positioning: HOB at 20-30 degrees  Taken 5/10/2025 0900 by Puja Rivera RN  Bleeding Precautions: blood pressure closely monitored  Activity Management: activity adjusted per tolerance  Infection Management: aseptic technique maintained  Head of Bed (HOB) Positioning: HOB at 20-30 degrees     Problem: Comorbidity Management  Goal: Maintenance of Asthma Control  Intervention: Maintain Asthma Symptom Control  Recent Flowsheet Documentation  Taken 5/10/2025 1306 by Puja Rivera RN  Medication Review/Management: medications reviewed  Taken 5/10/2025 0900 by Puja Rivera RN  Medication Review/Management: medications reviewed  Goal: Blood Glucose Levels Within Targeted Range  Intervention: Monitor and Manage Glycemia  Recent Flowsheet Documentation  Taken 5/10/2025 1306 by Puja Rivera RN  Medication Review/Management: medications reviewed  Taken 5/10/2025 0900 by Puja Rivera RN  Medication Review/Management: medications reviewed  Goal: Blood Pressure in Desired Range  Intervention: Maintain Blood Pressure Management  Recent Flowsheet Documentation  Taken 5/10/2025 1306 by Puja Rivera RN  Medication Review/Management: medications reviewed  Taken 5/10/2025 0900 by Puja Rivera RN  Medication Review/Management: medications reviewed  Goal: Maintenance of Behavioral Health Symptom Control  Intervention: Maintain Behavioral  Health Symptom Control  Recent Flowsheet Documentation  Taken 5/10/2025 1306 by Puja Rivera RN  Medication Review/Management: medications reviewed  Taken 5/10/2025 0900 by Puja Rivera RN  Medication Review/Management: medications reviewed  Goal: Maintenance of COPD Symptom Control  Intervention: Maintain COPD Symptom Control  Recent Flowsheet Documentation  Taken 5/10/2025 1306 by Puja Rivera RN  Medication Review/Management: medications reviewed  Taken 5/10/2025 0900 by Puja Rivera RN  Medication Review/Management: medications reviewed  Goal: Maintenance of Heart Failure Symptom Control  Intervention: Maintain Heart Failure Management  Recent Flowsheet Documentation  Taken 5/10/2025 1306 by Puja Rivera RN  Medication Review/Management: medications reviewed  Taken 5/10/2025 0900 by Puja Rivera RN  Medication Review/Management: medications reviewed  Goal: Blood Pressure in Desired Range  Intervention: Maintain Blood Pressure Management  Recent Flowsheet Documentation  Taken 5/10/2025 1306 by Puja Rivera RN  Medication Review/Management: medications reviewed  Taken 5/10/2025 0900 by Puja Rivera RN  Medication Review/Management: medications reviewed     Problem: Risk for Delirium  Goal: Improved Behavioral Control  Intervention: Minimize Safety Risk  Recent Flowsheet Documentation  Taken 5/10/2025 1306 by Puja Rivera RN  Enhanced Safety Measures: pain management  Trust Relationship/Rapport:   care explained   choices provided   thoughts/feelings acknowledged   emotional support provided   empathic listening provided   questions answered   questions encouraged   reassurance provided  Taken 5/10/2025 0900 by Puja Rivera RN  Enhanced Safety Measures: pain management  Trust Relationship/Rapport:   care explained   choices provided   thoughts/feelings acknowledged   emotional support provided   empathic listening provided   questions answered   questions encouraged   reassurance provided  Goal: Improved  Attention and Thought Clarity  Intervention: Maximize Cognitive Function  Recent Flowsheet Documentation  Taken 5/10/2025 1306 by Puja Rivera RN  Reorientation Measures:   calendar in view   clock in view  Taken 5/10/2025 0900 by Puja Rivera RN  Reorientation Measures:   calendar in view   clock in view     Problem: Fall Injury Risk  Goal: Absence of Fall and Fall-Related Injury  Intervention: Identify and Manage Contributors  Recent Flowsheet Documentation  Taken 5/10/2025 1306 by Puja Rivera RN  Medication Review/Management: medications reviewed  Taken 5/10/2025 0900 by Puja Rivera RN  Medication Review/Management: medications reviewed  Intervention: Promote Injury-Free Environment  Recent Flowsheet Documentation  Taken 5/10/2025 1306 by Puja Rivera RN  Safety Promotion/Fall Prevention: activity supervised  Taken 5/10/2025 0900 by Puja Rivera RN  Safety Promotion/Fall Prevention: activity supervised     Problem: Glycemic Control Impaired  Goal: Blood Glucose Level Within Targeted Range  Outcome: Progressing  Goal: Minimize Risk of Hypoglycemia  Outcome: Progressing     Problem: Gas Exchange Impaired  Goal: Optimal Gas Exchange  Outcome: Progressing  Intervention: Optimize Oxygenation and Ventilation  Recent Flowsheet Documentation  Taken 5/10/2025 1306 by Puja Rivera RN  Head of Bed (HOB) Positioning: HOB at 20-30 degrees  Taken 5/10/2025 0900 by Puja Rivera RN  Head of Bed (HOB) Positioning: HOB at 20-30 degrees   Goal Outcome Evaluation:       Pt has had an uneventful shift, VSS, S1S2 - pt is not tele status, pt denies pain.Pt showered with assist. Pt has had good PO intake & carb counting in progress. Pt right leg prosthesis is in room, but pt has not had it on. Pt pivots well to BSC. Protocols as ordered with re-checks in AM.  Will continue plan of care.

## 2025-05-10 NOTE — PLAN OF CARE
Problem: Comorbidity Management  Goal: Blood Glucose Levels Within Targeted Range  Intervention: Monitor and Manage Glycemia  Recent Flowsheet Documentation  Taken 5/10/2025 0015 by Elissa Cardoza RN  Medication Review/Management: medications reviewed     Problem: Comorbidity Management  Goal: Blood Pressure in Desired Range  Intervention: Maintain Blood Pressure Management  Recent Flowsheet Documentation  Taken 5/10/2025 0015 by Elissa Cardoza RN  Medication Review/Management: medications reviewed     Problem: Gas Exchange Impaired  Goal: Optimal Gas Exchange  Intervention: Optimize Oxygenation and Ventilation  Recent Flowsheet Documentation  Taken 5/10/2025 0250 by Elissa Cardoza RN  Head of Bed (HOB) Positioning: HOB at 20-30 degrees  Taken 5/10/2025 0015 by Elissa Cardoza RN  Head of Bed (HOB) Positioning: HOB at 20-30 degrees   Goal Outcome Evaluation:       Pt is alert and oriented x 4, denies pain, has SOB with activity. Lung sounds diminished, on O2 at 2 LPM per nasal cannula.At 0420. O2 decreased to 1 LPM. SpO2 99 to 100%. HR in the 60's to 70's, non tele. Assist of 1 with transfer to the bedside commode. Pt ate a snack around 0100, BG at 0200 was 99.

## 2025-05-11 LAB
ALBUMIN SERPL BCG-MCNC: 3.5 G/DL (ref 3.5–5.2)
ALP SERPL-CCNC: 186 U/L (ref 40–150)
ALT SERPL W P-5'-P-CCNC: 252 U/L (ref 0–50)
ANION GAP SERPL CALCULATED.3IONS-SCNC: 10 MMOL/L (ref 7–15)
AST SERPL W P-5'-P-CCNC: 85 U/L (ref 0–45)
ATRIAL RATE - MUSE: 79 BPM
BACTERIA SPEC CULT: NORMAL
BILIRUB DIRECT SERPL-MCNC: 0.21 MG/DL (ref 0–0.3)
BILIRUB SERPL-MCNC: 0.4 MG/DL
BUN SERPL-MCNC: 38.8 MG/DL (ref 8–23)
CALCIUM SERPL-MCNC: 7.9 MG/DL (ref 8.8–10.4)
CHLORIDE SERPL-SCNC: 102 MMOL/L (ref 98–107)
CREAT SERPL-MCNC: 1.38 MG/DL (ref 0.51–0.95)
DIASTOLIC BLOOD PRESSURE - MUSE: NORMAL MMHG
EGFRCR SERPLBLD CKD-EPI 2021: 44 ML/MIN/1.73M2
GLUCOSE BLDC GLUCOMTR-MCNC: 119 MG/DL (ref 70–99)
GLUCOSE BLDC GLUCOMTR-MCNC: 147 MG/DL (ref 70–99)
GLUCOSE BLDC GLUCOMTR-MCNC: 204 MG/DL (ref 70–99)
GLUCOSE BLDC GLUCOMTR-MCNC: 238 MG/DL (ref 70–99)
GLUCOSE BLDC GLUCOMTR-MCNC: 262 MG/DL (ref 70–99)
GLUCOSE SERPL-MCNC: 151 MG/DL (ref 70–99)
HCO3 SERPL-SCNC: 25 MMOL/L (ref 22–29)
INTERPRETATION ECG - MUSE: NORMAL
MAGNESIUM SERPL-MCNC: 1.5 MG/DL (ref 1.7–2.3)
MAGNESIUM SERPL-MCNC: 2.5 MG/DL (ref 1.7–2.3)
P AXIS - MUSE: 48 DEGREES
POTASSIUM SERPL-SCNC: 3.7 MMOL/L (ref 3.4–5.3)
PR INTERVAL - MUSE: 158 MS
PROT SERPL-MCNC: 6.5 G/DL (ref 6.4–8.3)
QRS DURATION - MUSE: 104 MS
QT - MUSE: 354 MS
QTC - MUSE: 405 MS
R AXIS - MUSE: 83 DEGREES
SODIUM SERPL-SCNC: 137 MMOL/L (ref 135–145)
SYSTOLIC BLOOD PRESSURE - MUSE: NORMAL MMHG
T AXIS - MUSE: 130 DEGREES
VENTRICULAR RATE- MUSE: 79 BPM

## 2025-05-11 PROCEDURE — 250N000013 HC RX MED GY IP 250 OP 250 PS 637: Performed by: INTERNAL MEDICINE

## 2025-05-11 PROCEDURE — 83735 ASSAY OF MAGNESIUM: CPT | Performed by: STUDENT IN AN ORGANIZED HEALTH CARE EDUCATION/TRAINING PROGRAM

## 2025-05-11 PROCEDURE — 250N000013 HC RX MED GY IP 250 OP 250 PS 637: Performed by: STUDENT IN AN ORGANIZED HEALTH CARE EDUCATION/TRAINING PROGRAM

## 2025-05-11 PROCEDURE — 94640 AIRWAY INHALATION TREATMENT: CPT | Mod: 76

## 2025-05-11 PROCEDURE — 250N000011 HC RX IP 250 OP 636: Performed by: INTERNAL MEDICINE

## 2025-05-11 PROCEDURE — 99232 SBSQ HOSP IP/OBS MODERATE 35: CPT | Performed by: STUDENT IN AN ORGANIZED HEALTH CARE EDUCATION/TRAINING PROGRAM

## 2025-05-11 PROCEDURE — 80048 BASIC METABOLIC PNL TOTAL CA: CPT | Performed by: INTERNAL MEDICINE

## 2025-05-11 PROCEDURE — 93005 ELECTROCARDIOGRAM TRACING: CPT

## 2025-05-11 PROCEDURE — 250N000012 HC RX MED GY IP 250 OP 636 PS 637: Performed by: STUDENT IN AN ORGANIZED HEALTH CARE EDUCATION/TRAINING PROGRAM

## 2025-05-11 PROCEDURE — 94640 AIRWAY INHALATION TREATMENT: CPT

## 2025-05-11 PROCEDURE — 250N000011 HC RX IP 250 OP 636: Performed by: STUDENT IN AN ORGANIZED HEALTH CARE EDUCATION/TRAINING PROGRAM

## 2025-05-11 PROCEDURE — 250N000009 HC RX 250: Performed by: INTERNAL MEDICINE

## 2025-05-11 PROCEDURE — 99232 SBSQ HOSP IP/OBS MODERATE 35: CPT | Performed by: INTERNAL MEDICINE

## 2025-05-11 PROCEDURE — 120N000004 HC R&B MS OVERFLOW

## 2025-05-11 PROCEDURE — 82248 BILIRUBIN DIRECT: CPT | Performed by: STUDENT IN AN ORGANIZED HEALTH CARE EDUCATION/TRAINING PROGRAM

## 2025-05-11 RX ORDER — METOPROLOL SUCCINATE 25 MG/1
25 TABLET, EXTENDED RELEASE ORAL 2 TIMES DAILY
Status: DISCONTINUED | OUTPATIENT
Start: 2025-05-11 | End: 2025-05-12 | Stop reason: HOSPADM

## 2025-05-11 RX ORDER — SPIRONOLACTONE 25 MG
12.5 TABLET ORAL DAILY
Status: DISCONTINUED | OUTPATIENT
Start: 2025-05-12 | End: 2025-05-12 | Stop reason: HOSPADM

## 2025-05-11 RX ORDER — MAGNESIUM SULFATE 4 G/50ML
4 INJECTION INTRAVENOUS ONCE
Status: COMPLETED | OUTPATIENT
Start: 2025-05-11 | End: 2025-05-11

## 2025-05-11 RX ORDER — PROCHLORPERAZINE MALEATE 5 MG/1
10 TABLET ORAL EVERY 6 HOURS PRN
Status: DISCONTINUED | OUTPATIENT
Start: 2025-05-11 | End: 2025-05-12 | Stop reason: HOSPADM

## 2025-05-11 RX ORDER — AMOXICILLIN 250 MG
1 CAPSULE ORAL 2 TIMES DAILY
Status: DISCONTINUED | OUTPATIENT
Start: 2025-05-11 | End: 2025-05-12 | Stop reason: HOSPADM

## 2025-05-11 RX ORDER — FUROSEMIDE 40 MG/1
40 TABLET ORAL DAILY
Status: DISCONTINUED | OUTPATIENT
Start: 2025-05-12 | End: 2025-05-12 | Stop reason: HOSPADM

## 2025-05-11 RX ORDER — POTASSIUM CHLORIDE 1500 MG/1
20 TABLET, EXTENDED RELEASE ORAL ONCE
Status: COMPLETED | OUTPATIENT
Start: 2025-05-11 | End: 2025-05-11

## 2025-05-11 RX ADMIN — GUAIFENESIN AND DEXTROMETHORPHAN 10 ML: 100; 10 SYRUP ORAL at 21:12

## 2025-05-11 RX ADMIN — BUPROPION HYDROCHLORIDE 300 MG: 300 TABLET, EXTENDED RELEASE ORAL at 08:38

## 2025-05-11 RX ADMIN — GABAPENTIN 300 MG: 300 CAPSULE ORAL at 08:38

## 2025-05-11 RX ADMIN — POTASSIUM CHLORIDE 20 MEQ: 1500 TABLET, EXTENDED RELEASE ORAL at 08:38

## 2025-05-11 RX ADMIN — GABAPENTIN 300 MG: 300 CAPSULE ORAL at 13:32

## 2025-05-11 RX ADMIN — ALBUTEROL SULFATE 2 PUFF: 90 AEROSOL, METERED RESPIRATORY (INHALATION) at 08:42

## 2025-05-11 RX ADMIN — VENLAFAXINE HYDROCHLORIDE 75 MG: 75 CAPSULE, EXTENDED RELEASE ORAL at 08:43

## 2025-05-11 RX ADMIN — METHOCARBAMOL 500 MG: 500 TABLET ORAL at 19:31

## 2025-05-11 RX ADMIN — INSULIN ASPART 15 UNITS: 100 INJECTION, SOLUTION INTRAVENOUS; SUBCUTANEOUS at 19:24

## 2025-05-11 RX ADMIN — INSULIN ASPART 6 UNITS: 100 INJECTION, SOLUTION INTRAVENOUS; SUBCUTANEOUS at 15:06

## 2025-05-11 RX ADMIN — QUETIAPINE FUMARATE 25 MG: 25 TABLET ORAL at 21:12

## 2025-05-11 RX ADMIN — PANTOPRAZOLE SODIUM 40 MG: 40 TABLET, DELAYED RELEASE ORAL at 06:52

## 2025-05-11 RX ADMIN — GABAPENTIN 300 MG: 300 CAPSULE ORAL at 21:12

## 2025-05-11 RX ADMIN — SENNOSIDES AND DOCUSATE SODIUM 1 TABLET: 50; 8.6 TABLET ORAL at 08:39

## 2025-05-11 RX ADMIN — MAGNESIUM SULFATE HEPTAHYDRATE 4 G: 4 INJECTION, SOLUTION INTRAVENOUS at 08:32

## 2025-05-11 RX ADMIN — IPRATROPIUM BROMIDE AND ALBUTEROL SULFATE 3 ML: .5; 3 SOLUTION RESPIRATORY (INHALATION) at 18:25

## 2025-05-11 RX ADMIN — Medication 10 MG: at 21:12

## 2025-05-11 RX ADMIN — BUSPIRONE HYDROCHLORIDE 5 MG: 5 TABLET ORAL at 08:43

## 2025-05-11 RX ADMIN — VALACYCLOVIR HYDROCHLORIDE 500 MG: 500 TABLET, FILM COATED ORAL at 08:43

## 2025-05-11 RX ADMIN — FLUCONAZOLE 200 MG: 200 TABLET ORAL at 13:32

## 2025-05-11 RX ADMIN — CLOPIDOGREL 75 MG: 75 TABLET ORAL at 19:26

## 2025-05-11 RX ADMIN — TRAZODONE HYDROCHLORIDE 50 MG: 50 TABLET ORAL at 21:12

## 2025-05-11 RX ADMIN — SENNOSIDES AND DOCUSATE SODIUM 1 TABLET: 50; 8.6 TABLET ORAL at 21:12

## 2025-05-11 RX ADMIN — LEVOTHYROXINE SODIUM 125 MCG: 0.12 TABLET ORAL at 06:52

## 2025-05-11 RX ADMIN — SPIRONOLACTONE 12.5 MG: 25 TABLET, FILM COATED ORAL at 08:38

## 2025-05-11 RX ADMIN — BUSPIRONE HYDROCHLORIDE 5 MG: 5 TABLET ORAL at 21:12

## 2025-05-11 RX ADMIN — METOPROLOL SUCCINATE 25 MG: 25 TABLET, EXTENDED RELEASE ORAL at 21:12

## 2025-05-11 RX ADMIN — OXYCODONE HYDROCHLORIDE 5 MG: 5 TABLET ORAL at 17:43

## 2025-05-11 RX ADMIN — BUSPIRONE HYDROCHLORIDE 5 MG: 5 TABLET ORAL at 13:32

## 2025-05-11 RX ADMIN — FLUTICASONE FUROATE AND VILANTEROL TRIFENATATE 1 PUFF: 200; 25 POWDER RESPIRATORY (INHALATION) at 08:42

## 2025-05-11 RX ADMIN — FUROSEMIDE 40 MG: 10 INJECTION, SOLUTION INTRAMUSCULAR; INTRAVENOUS at 08:36

## 2025-05-11 RX ADMIN — INSULIN ASPART 5 UNITS: 100 INJECTION, SOLUTION INTRAVENOUS; SUBCUTANEOUS at 09:35

## 2025-05-11 ASSESSMENT — ACTIVITIES OF DAILY LIVING (ADL)
ADLS_ACUITY_SCORE: 62
ADLS_ACUITY_SCORE: 62
ADLS_ACUITY_SCORE: 60
ADLS_ACUITY_SCORE: 62
ADLS_ACUITY_SCORE: 62
ADLS_ACUITY_SCORE: 60
ADLS_ACUITY_SCORE: 62
ADLS_ACUITY_SCORE: 62
ADLS_ACUITY_SCORE: 60
ADLS_ACUITY_SCORE: 62
ADLS_ACUITY_SCORE: 60
ADLS_ACUITY_SCORE: 62
ADLS_ACUITY_SCORE: 62
ADLS_ACUITY_SCORE: 60
ADLS_ACUITY_SCORE: 60
ADLS_ACUITY_SCORE: 62

## 2025-05-11 NOTE — PROGRESS NOTES
"  '    RENAL ([facility]) progress note  CC: F/U REE  S: Since lst seen, Gloria had good UO overnight and edema is near her baseline. No dizziness but SBP 80's this AM - Care d/w Puja RN at bedside.  Denies orthopnea, CP, or cramping. Creatinine stable/ slightly improved. Awaiting TCU.     A/P:   REE due to ATN: Creatinine of 1.47 up from 0.99 on 5/2. Patient with recent angio and 2 stent placement(5/1)also with significant hypotension on 5/2. In the setting of CHF and ongoing diuresis.   Creatinine slowly improving. Could discharge from renal standpoint once TCU available    EGFR (cystatin C) = 33 ml/min on 5/9 with Cr 1.4  Daily renal function labs, wts, I/Os     2. CAD: w/ STEMI s/p angio and stent placement. Per cardiology.     3. HFpEF: setting of acute STEMI  - Currently on Lasix 20 mg IV q12 h -> PO soon? Defer to Cards.    - Trend labs and daily weights.       4. Type 2 DM: per primary.     5. HTN: BP stable at present. Continue same management. Holding home lisinopril.       Awaiting TCU.  OK to discharge from renal standpoint    Albert Babcock MD  [facility]  [phone] (off)          No interval changes to past medical history, social history or family history to report.    /60   Pulse 81   Temp 97.9  F (36.6  C) (Oral)   Resp 20   Ht 1.651 m (5' 5\")   Wt 73.4 kg (161 lb 13.1 oz)   [facility] 11/23/2012   SpO2 92%   BMI 26.93 kg/m      I/O last 3 completed shifts:  In: 510 [P.O.:480; I.V.:30]  Out: 3500 [Urine:3500]    Physical Exam:   GENERAL: NAD, alert  EYES: No scleral icterus, conjunctiva clear  ENT: Hearing normal, MMM  RESP: CTA bilat, no rales, no resp distress.   CV: RRR, 1+ leg edema.    GI: Soft, NT/ND, BS active  Musculoskeletal: right leg AKA    SKIN: No rash  PSYCH:  Appropriate mood and affect    Recent Labs   Lab 05/11/25  0532 05/10/25  0621 05/09/25  0512 05/08/25  0602 05/07/25  0614 05/06/25  0416 05/05/25  0420    135 131* 132* 131* 131* 135 "   POTASSIUM 3.7 4.1 4.6 4.5 4.5 4.7 4.4   CHLORIDE 102 101 99 98 98 99 103   CO2 25 23 22 22 23 23 25   BUN 38.8* 38.8* 36.0* 35.8* 29.0* 24.6* 19.6   CR 1.38* 1.41* 1.43* 1.42* 1.37* 1.32* 0.92   GFRESTIMATED 44* 42* 42* 42* 44* 46* 71   LESLY 7.9* 8.9 8.1* 8.2* 8.3* 8.2* 8.1*   MAG 1.5* 1.7 1.8 1.8 1.7 1.8 1.8   ALBUMIN 3.5  --   --   --   --   --   --        Recent Labs   Lab 05/10/25  0621 05/09/25  0512 05/08/25  1734 05/08/25  0602 05/07/25  1114 05/07/25  0614 05/06/25  0952 05/05/25  0420   WBC 11.7* 10.8  --  10.2 10.9 11.4* 12.5* 11.2*   HGB 8.0* 7.9* 7.9* 6.8* 7.1* 7.0* 7.3* 7.3*   HCT 25.7* 26.1*  --  22.8* 24.0* 23.4* 24.8* 24.3*   MCV 77* 77*  --  76* 76* 76* 76* 76*    401  --  329 331 315 300 253             Current Facility-Administered Medications:     albuterol (PROVENTIL HFA/VENTOLIN HFA) inhaler, 2 puff, Inhalation, Q4H PRN, Candice Kline MD, 2 puff at 05/11/25 0842    buprenorphine (BUTRANS) 5 MCG/HR WK patch 1 patch, 1 patch, Transdermal, Weekly **AND** buprenorphine (BUTRANS) Patch in Place, , Transdermal, Q8H TANAEliud Catherine Leja, MD    buPROPion (WELLBUTRIN XL) 24 hr tablet 300 mg, 300 mg, Oral, QAMEliud Catherine Leja, MD, 300 mg at 05/11/25 0838    busPIRone (BUSPAR) tablet 5 mg, 5 mg, Oral, TID, Candice Kline MD, 5 mg at 05/11/25 0843    clopidogrel (PLAVIX) tablet 75 mg, 75 mg, Oral, Daily, Candice Kline MD, 75 mg at 05/10/25 2103    Continuing beta blocker from home medication list OR beta blocker order already placed during this visit, , Does not apply, DOES NOT GO TO Eliud LYNCH Catherine Leja, MD    glucose gel 15-30 g, 15-30 g, Oral, Q15 Min PRN **OR** dextrose 50 % injection 25-50 mL, 25-50 mL, Intravenous, Q15 Min PRN **OR** glucagon injection 1 mg, 1 mg, Subcutaneous, Q15 Min PRN, Candice Kline MD    diclofenac (VOLTAREN) 1 % topical gel 2 g, 2 g, Topical, 4x Daily PRN, Candice Kline MD    fluconazole (DIFLUCAN)  tablet 200 mg, 200 mg, Oral, Daily, Ana Laura Bullock MD    fluticasone-vilanterol (BREO ELLIPTA) 200-25 MCG/ACT inhaler 1 puff, 1 puff, Inhalation, Daily, Candice Kline MD, 1 puff at 05/11/25 0842    furosemide (LASIX) injection 40 mg, 40 mg, Intravenous, BID, Satya Marie MD, 40 mg at 05/11/25 0836    [Held by provider] furosemide (LASIX) tablet 40 mg, 40 mg, Oral, BID, Satya Marie MD    gabapentin (NEURONTIN) capsule 300 mg, 300 mg, Oral, TID, Candice Kline MD, 300 mg at 05/11/25 0838    guaiFENesin-dextromethorphan (ROBITUSSIN DM) 100-10 MG/5ML syrup 10 mL, 10 mL, Oral, Q4H PRN, Candice Kline MD, 10 mL at 05/10/25 1550    hydrALAZINE (APRESOLINE) injection 10 mg, 10 mg, Intravenous, Q4H PRN, Candice Kline MD    insulin aspart (NovoLOG) injection (RAPID ACTING), 1-7 Units, Subcutaneous, TID AC, Candice Kline MD, 4 Units at 05/10/25 1800    insulin aspart (NovoLOG) injection (RAPID ACTING), 1-5 Units, Subcutaneous, At Bedtime, Candice Kline MD, 2 Units at 05/10/25 2104    insulin aspart (NovoLOG) injection (RAPID ACTING), , Subcutaneous, TID w/meals, Candice Kline MD, 4 Units at 05/10/25 1802    insulin glargine (LANTUS PEN) injection 20 Units, 20 Units, Subcutaneous, At Bedtime, Ana Laura Bullock MD, 20 Units at 05/10/25 2105    ipratropium - albuterol 0.5 mg/2.5 mg/3 mL (DUONEB) neb solution 3 mL, 1 vial, Nebulization, Q6H PRN, Candice Kline MD, 3 mL at 05/08/25 2146    levothyroxine (SYNTHROID/LEVOTHROID) tablet 125 mcg, 125 mcg, Oral, QAM AC, Candice Kline MD, 125 mcg at 05/11/25 0652    Lidocaine (LIDOCARE) 4 % Patch 1 patch, 1 patch, Transdermal, Q24H, Candice Kline MD, 1 patch at 05/04/25 1010    magnesium sulfate 4 g in 50 mL sterile water intermittent infusion, 4 g, Intravenous, Once, Ike Payne MD, Last Rate: 25 mL/hr at 05/11/25 0832, 4 g at 05/11/25 0832     melatonin tablet 10 mg, 10 mg, Oral, At Bedtime, Candice Kline MD, 10 mg at 05/10/25 2103    [Held by provider] metFORMIN (GLUCOPHAGE XR) 24 hr tablet 1,000 mg, 1,000 mg, Oral, BID w/meals, Tej Weller MD    methocarbamol (ROBAXIN) tablet 500 mg, 500 mg, Oral, Q6H PRN, Candice Kline MD, 500 mg at 05/10/25 2104    metoprolol (LOPRESSOR) injection 5 mg, 5 mg, Intravenous, Q15 Min PRN, Candice Kline MD    metoprolol succinate ER (TOPROL-XL) 24 hr half-tab 12.5 mg, 12.5 mg, Oral, BID, Candice Kline MD, 12.5 mg at 05/10/25 2103    naloxone (NARCAN) injection 0.2 mg, 0.2 mg, Intravenous, Q2 Min PRN **OR** naloxone (NARCAN) injection 0.4 mg, 0.4 mg, Intravenous, Q2 Min PRN **OR** naloxone (NARCAN) injection 0.2 mg, 0.2 mg, Intramuscular, Q2 Min PRN **OR** naloxone (NARCAN) injection 0.4 mg, 0.4 mg, Intramuscular, Q2 Min PRN, Candice Kline MD    nitroGLYcerin (NITROSTAT) sublingual tablet 0.4 mg, 0.4 mg, Sublingual, Q5 Min PRN, Candice Kline MD    ondansetron (ZOFRAN ODT) ODT tab 4 mg, 4 mg, Oral, Q6H PRN **OR** ondansetron (ZOFRAN) injection 4 mg, 4 mg, Intravenous, Q6H PRN, Candice Kline MD, 4 mg at 05/07/25 2049    oxyCODONE (ROXICODONE) tablet 5 mg, 5 mg, Oral, Q4H PRN, 5 mg at 05/10/25 2104 **OR** oxyCODONE (ROXICODONE) tablet 10 mg, 10 mg, Oral, Q4H PRN, Candice Kline MD    pantoprazole (PROTONIX) EC tablet 40 mg, 40 mg, Oral, QAM AC, Candice Kline MD, 40 mg at 05/11/25 0652    Percutaneous Coronary Intervention orders placed (this is information for BPA alerting), , Does not apply, DOES NOT GO TO Eliud LYNCH Catherine Leja, MD    QUEtiapine (SEROquel) tablet 25 mg, 25 mg, Oral, At Bedtime, Candice Kline MD, 25 mg at 05/10/25 2104    senna-docusate (SENOKOT-S/PERICOLACE) 8.6-50 MG per tablet 1 tablet, 1 tablet, Oral, BID, Ike Payne MD, 1 tablet at 05/11/25 0839    sodium chloride (PF) 0.9% PF  flush 10-20 mL, 10-20 mL, Intracatheter, q1 min prn, Candice Kline MD    sodium chloride (PF) 0.9% PF flush 10-40 mL, 10-40 mL, Intracatheter, Once PRN, Candice Kline MD    sodium chloride (PF) 0.9% PF flush 10-40 mL, 10-40 mL, Intracatheter, Q7 Days, Candice Kline MD, 30 mL at 05/09/25 2153    sodium chloride (PF) 0.9% PF flush 10-40 mL, 10-40 mL, Intracatheter, Daily, Candice Kline MD, 30 mL at 05/11/25 0834    spironolactone (ALDACTONE) half-tab 12.5 mg, 12.5 mg, Oral, BID, Sowmya Lennon, PA-C, 12.5 mg at 05/11/25 0838    topiramate (TOPAMAX) tablet 25 mg, 25 mg, Oral, BID PRN, Candice Kline MD    traZODone (DESYREL) tablet 50 mg, 50 mg, Oral, At Bedtime PRN, Candice Kline MD, 50 mg at 05/10/25 2104    valACYclovir (VALTREX) tablet 500 mg, 500 mg, Oral, Daily, Candice Kline MD, 500 mg at 05/11/25 0843    venlafaxine (EFFEXOR XR) 24 hr capsule 75 mg, 75 mg, Oral, Daily, Candice Kline MD, 75 mg at 05/11/25 0843      Labs personally reviewed today during this evaluation at 10:29 AM

## 2025-05-11 NOTE — PLAN OF CARE
Problem: Adult Inpatient Plan of Care  Goal: Absence of Hospital-Acquired Illness or Injury  Intervention: Identify and Manage Fall Risk  Recent Flowsheet Documentation  Taken 5/11/2025 1127 by Puja Rivera RN  Safety Promotion/Fall Prevention: activity supervised  Taken 5/11/2025 0830 by Puja Rivera RN  Safety Promotion/Fall Prevention: activity supervised  Intervention: Prevent Skin Injury  Recent Flowsheet Documentation  Taken 5/11/2025 1127 by Puja Rivera RN  Body Position: position changed independently  Taken 5/11/2025 0830 by Puja Rivera RN  Body Position: position changed independently  Intervention: Prevent Infection  Recent Flowsheet Documentation  Taken 5/11/2025 1127 by Puja Rivera RN  Infection Prevention:   hand hygiene promoted   equipment surfaces disinfected  Taken 5/11/2025 0830 by Puja Rivera RN  Infection Prevention:   hand hygiene promoted   equipment surfaces disinfected  Goal: Optimal Comfort and Wellbeing  Intervention: Provide Person-Centered Care  Recent Flowsheet Documentation  Taken 5/11/2025 1127 by Puja Rivera RN  Trust Relationship/Rapport:   care explained   choices provided   thoughts/feelings acknowledged   emotional support provided   empathic listening provided   questions answered   questions encouraged   reassurance provided  Taken 5/11/2025 0830 by Puja Rivera RN  Trust Relationship/Rapport:   care explained   choices provided   thoughts/feelings acknowledged   emotional support provided   empathic listening provided   questions answered   questions encouraged   reassurance provided     Problem: Cardiac Catheterization (Diagnostic/Interventional)  Goal: Absence of Contrast-Induced Injury  Intervention: Prevent Systemic Contrast Dye Reaction  Recent Flowsheet Documentation  Taken 5/11/2025 1127 by Puja Rivera RN  Stabilization Measures: verbal stimulation provided  Taken 5/11/2025 0830 by Puja Rivera RN  Stabilization Measures: verbal stimulation  February 6, 2024     Patient: Ernesto Clarke   YOB: 2007   Date of Visit: 2/6/2024       To Whom It May Concern:    Ernesto Clarke was seen in my clinic on 2/6/2024 at 1:40 pm. Ernesto should sports and work for the next 7 days and may return to sports and work on Monday February 12, 2024.    If you have any questions or concerns, please don't hesitate to call.         Sincerely,         Lissette Swenson DO        CC: No Recipients   provided  Goal: Stable Heart Rate and Rhythm  Intervention: Monitor and Manage Cardiac Rhythm Effect  Recent Flowsheet Documentation  Taken 5/11/2025 1127 by Puja Rivera RN  Fluid/Electrolyte Management: fluids provided  Goal: Anesthesia/Sedation Recovery  Intervention: Optimize Anesthesia Recovery  Recent Flowsheet Documentation  Taken 5/11/2025 1127 by Puja Rivera RN  Safety Promotion/Fall Prevention: activity supervised  Reorientation Measures:   calendar in view   clock in view  Taken 5/11/2025 0830 by Puja Rivera RN  Safety Promotion/Fall Prevention: activity supervised  Reorientation Measures:   calendar in view   clock in view  Goal: Absence of Vascular Access Complication  Intervention: Prevent and Manage Access Complications  Recent Flowsheet Documentation  Taken 5/11/2025 1339 by Puja Rivera RN  Activity Management: activity adjusted per tolerance  Taken 5/11/2025 1127 by Puja Rivera RN  Bleeding Precautions: blood pressure closely monitored  Activity Management: activity adjusted per tolerance  Infection Management: aseptic technique maintained  Head of Bed (HOB) Positioning: HOB at 20-30 degrees  Taken 5/11/2025 0830 by Puja Rivera RN  Bleeding Precautions: blood pressure closely monitored  Activity Management: activity adjusted per tolerance  Infection Management: aseptic technique maintained  Head of Bed (HOB) Positioning: HOB at 20-30 degrees     Problem: Cardiac Catheterization (Diagnostic/Interventional)  Goal: Absence of Bleeding  Outcome: Progressing  Goal: Absence of Contrast-Induced Injury  Outcome: Progressing  Intervention: Prevent Systemic Contrast Dye Reaction  Recent Flowsheet Documentation  Taken 5/11/2025 1127 by Puja Rivera RN  Stabilization Measures: verbal stimulation provided  Taken 5/11/2025 0830 by Puja Rivera RN  Stabilization Measures: verbal stimulation provided  Goal: Stable Heart Rate and Rhythm  Outcome: Progressing  Intervention: Monitor and Manage Cardiac  Rhythm Effect  Recent Flowsheet Documentation  Taken 5/11/2025 1127 by Puja Rivera RN  Fluid/Electrolyte Management: fluids provided  Goal: Absence of Embolism Signs and Symptoms  Outcome: Progressing  Goal: Anesthesia/Sedation Recovery  Outcome: Progressing  Intervention: Optimize Anesthesia Recovery  Recent Flowsheet Documentation  Taken 5/11/2025 1127 by Puja Rivera RN  Safety Promotion/Fall Prevention: activity supervised  Reorientation Measures:   calendar in view   clock in view  Taken 5/11/2025 0830 by Puja Rivera RN  Safety Promotion/Fall Prevention: activity supervised  Reorientation Measures:   calendar in view   clock in view  Goal: Optimal Pain Control and Function  Outcome: Progressing  Goal: Absence of Vascular Access Complication  Outcome: Progressing  Intervention: Prevent and Manage Access Complications  Recent Flowsheet Documentation  Taken 5/11/2025 1339 by Puja Rivera RN  Activity Management: activity adjusted per tolerance  Taken 5/11/2025 1127 by Puja Rivera RN  Bleeding Precautions: blood pressure closely monitored  Activity Management: activity adjusted per tolerance  Infection Management: aseptic technique maintained  Head of Bed (HOB) Positioning: HOB at 20-30 degrees  Taken 5/11/2025 0830 by Puja Rivera RN  Bleeding Precautions: blood pressure closely monitored  Activity Management: activity adjusted per tolerance  Infection Management: aseptic technique maintained  Head of Bed (HOB) Positioning: HOB at 20-30 degrees     Problem: Comorbidity Management  Goal: Maintenance of Asthma Control  Intervention: Maintain Asthma Symptom Control  Recent Flowsheet Documentation  Taken 5/11/2025 1127 by Puja Rivera RN  Medication Review/Management: medications reviewed  Taken 5/11/2025 0830 by Puja Rivera RN  Medication Review/Management: medications reviewed  Goal: Blood Glucose Levels Within Targeted Range  Intervention: Monitor and Manage Glycemia  Recent Flowsheet  Documentation  Taken 5/11/2025 1127 by Puja Rivera RN  Medication Review/Management: medications reviewed  Taken 5/11/2025 0830 by Puja Rivera RN  Medication Review/Management: medications reviewed  Goal: Blood Pressure in Desired Range  Intervention: Maintain Blood Pressure Management  Recent Flowsheet Documentation  Taken 5/11/2025 1127 by Puja Rivera RN  Medication Review/Management: medications reviewed  Taken 5/11/2025 0830 by Puja Rivera RN  Medication Review/Management: medications reviewed  Goal: Maintenance of Behavioral Health Symptom Control  Intervention: Maintain Behavioral Health Symptom Control  Recent Flowsheet Documentation  Taken 5/11/2025 1127 by Puja Rivera RN  Medication Review/Management: medications reviewed  Taken 5/11/2025 0830 by Puja Rivera RN  Medication Review/Management: medications reviewed  Goal: Maintenance of COPD Symptom Control  Intervention: Maintain COPD Symptom Control  Recent Flowsheet Documentation  Taken 5/11/2025 1127 by Puja Rivera RN  Medication Review/Management: medications reviewed  Taken 5/11/2025 0830 by Puja Rivera RN  Medication Review/Management: medications reviewed  Goal: Maintenance of Heart Failure Symptom Control  Intervention: Maintain Heart Failure Management  Recent Flowsheet Documentation  Taken 5/11/2025 1127 by Puja Rivera RN  Medication Review/Management: medications reviewed  Taken 5/11/2025 0830 by Puja Rivera RN  Medication Review/Management: medications reviewed  Goal: Blood Pressure in Desired Range  Intervention: Maintain Blood Pressure Management  Recent Flowsheet Documentation  Taken 5/11/2025 1127 by Puja Rivera RN  Medication Review/Management: medications reviewed  Taken 5/11/2025 0830 by Puja Rivera RN  Medication Review/Management: medications reviewed     Problem: Risk for Delirium  Goal: Improved Behavioral Control  Intervention: Minimize Safety Risk  Recent Flowsheet Documentation  Taken 5/11/2025 1127 by Miguel  Puja RIDER RN  Enhanced Safety Measures: pain management  Trust Relationship/Rapport:   care explained   choices provided   thoughts/feelings acknowledged   emotional support provided   empathic listening provided   questions answered   questions encouraged   reassurance provided  Taken 5/11/2025 0830 by Puja Rivera RN  Enhanced Safety Measures: pain management  Trust Relationship/Rapport:   care explained   choices provided   thoughts/feelings acknowledged   emotional support provided   empathic listening provided   questions answered   questions encouraged   reassurance provided  Goal: Improved Attention and Thought Clarity  Intervention: Maximize Cognitive Function  Recent Flowsheet Documentation  Taken 5/11/2025 1127 by Puja Rivera RN  Sensory Stimulation Regulation: television on  Reorientation Measures:   calendar in view   clock in view  Taken 5/11/2025 0830 by Puja Rivera RN  Reorientation Measures:   calendar in view   clock in view     Problem: Fall Injury Risk  Goal: Absence of Fall and Fall-Related Injury  Intervention: Identify and Manage Contributors  Recent Flowsheet Documentation  Taken 5/11/2025 1127 by Puja Rivera RN  Medication Review/Management: medications reviewed  Taken 5/11/2025 0830 by Puja Rivera RN  Medication Review/Management: medications reviewed  Intervention: Promote Injury-Free Environment  Recent Flowsheet Documentation  Taken 5/11/2025 1127 by Puja Rivera RN  Safety Promotion/Fall Prevention: activity supervised  Taken 5/11/2025 0830 by Puja Rivera RN  Safety Promotion/Fall Prevention: activity supervised     Problem: Glycemic Control Impaired  Goal: Blood Glucose Level Within Targeted Range  Outcome: Progressing  Goal: Minimize Risk of Hypoglycemia  Outcome: Progressing     Problem: Gas Exchange Impaired  Goal: Optimal Gas Exchange  Outcome: Progressing  Intervention: Optimize Oxygenation and Ventilation  Recent Flowsheet Documentation  Taken 5/11/2025 1127 by Miguel  Puja RIDER RN  Head of Bed (HOB) Positioning: HOB at 20-30 degrees  Taken 5/11/2025 0830 by Puja Rivera RN  Head of Bed (HOB) Positioning: HOB at 20-30 degrees   Goal Outcome Evaluation:         Pt has had an uneventful shift. VSS, NSR, Assist 1 /gait bels with R AKA, pt has good pivot turns from bed to BSC. Medications, carb count and labs have been reviewed. Pt had a late lunch related to visiting with visiting son.Discharge planning in progress to TCU. Protocols as ordered with re-checks in the AM. CHG bath was completed. Will continue plan of care.

## 2025-05-11 NOTE — PROGRESS NOTES
Care Management Follow Up    Length of Stay (days): 10    Expected Discharge Date: 05/12/2025    Anticipated Discharge Plan:   [facility]    Transportation: Confirmed medical transport    PT Recommendations: Transitional Care Facility  OT Recommendations:  Transitional Care Facility     Barriers to Discharge: medical stability    Prior Living Situation: apartment with alone    Discussed  Partnership in Safe Discharge Planning  document with patient/family: No     Handoff Completed: No, handoff not indicated or clinically appropriate    Patient/Spokesperson Updated: Yes. Who? Patient     Additional Information:    Chart review, discussed with medical team, anticipate patient will be stable to discharge tomorrow.  She has been accepted to [facility].  Met with patient to discuss and confirm.  Inquired, she is uncertain if family available to assist with ride; offered and she requests wheelchair transportation.  Discussed, she will let CM know if her family is available and we can cancel ride.    Next Steps:     CM continues to follow for discharge needs and support; confirm discharge orders and assure faxed to TCU.    WANDA Jacques    PAS completed - [id number]    2:43 PM

## 2025-05-11 NOTE — PROGRESS NOTES
[facility]    Medicine Progress Note - Hospitalist Service    Date of Admission:  5/1/2025    Assessment & Plan   59-year-old female with history of PAD status post right AKA, chronic kidney disease, anxiety, type 2 diabetes, hypertension, C. difficile and known coronary artery disease status post multiple PCI was admitted to ICU after STEMI and found to have severe occlusion of the LAD which was stented with 1 stent to the diagonal and 1 to the LAD.  [facility] consulted for postoperative management.  -- On IV Lasix for acute pulmonary edema. Acute hypoxic respiratory failure resolving.  -- Esophageal candidiasis. Fluconazole started. Monitor LFTs and QTc  -- Dispo: Likely to TCU on Monday pending final cards recommendation.     STEMI  Coronary artery disease  - Status post PCI to prox LAD and Ist diagonal branch on 05/01  - Management per cardiac team.  Also has severe RCA disease which is nonoperative due to multiple comorbidities and will need to be managed medically.  - S/p Brilinta and ASA. Currently on Plavix     Acute hypoxic respiratory failure-improving  Acute cardiogenic pulmonary edema-resolved  HFrEF exacerbation  Ischemic cardiomyopathy  - Off of CPAP. On 1L NC now. Wean as able  - Echo- EF 25-30% with anterior, septal and apical wall akinesis, no LV thrombus  - 05/07: Cards ordered d-dimer earlier which came back elevated. Discussed with cards- The fact that CXR showing new small b/l pleural effusion and mild diffuse increase in interstitial prominence and pt's sob improving after switching from ticagrelor to plavix makes PE unlikely as the cause of sob. D-dimer is elevated likely 2/2 STEMI and ree. Pt also has severe anemia and worsening REE which puts patient as elevated risk of renal failure with CTA and bleeding with AC. We can start with US-duplex although VTE is less likely.  - Continue Lasix 40 mg oral daily  -Continue Toprol 25 mg oral twice daily  -Continue spironolactone  12.5 mg oral daily      Hypotension-resolved  -BP dropped to 60s/30s on 5/2. Mild improvement with 250ml fluid bolus and 25gm of albumin.   -Dobutamine gtt and NE gtt started per cardiology. NE gtt topped 5/4; Dobutamine gtt stopped as of 5/5  -Cortisol level- normal     Type 2 diabetes:   - Hold home glipizide and metformin and start diabetic order set, most recent A1c 7.7 1-month ago.  - Patient requests regular diet  - Accu-checks, Medium sliding scale, mealtime novolog 1:10. Lantus reduced to 20 units due to low BS in am, Hypoglycemia protocol     Hypertension:  - Holding home meds-Norvasc, Carvedilol, Lisinopril  - Toprol XL 12.5 bid     History of C. difficile:   - Reports diarrhea PTA.   - Cdiff toxin B by PCR+, Cdiff GDH antigen+, Cdiff toxin-. ID consulted. No need for treatment per ID. Avoid unnecessary antibiotics per ID.      Elevated liver enzymes:   - Likely secondary to the above acute issues, will trend.  - Elevate transaminases with normal alk phos and T bili noted. Patient is s/p lap laurence (many years ago per patient)  - Trend. CT abd/pelvis- moderate fatty infiltration of the liver  - On Repatha at home. Not on statin.  - Monitor while on fluconazole. Check in am     REE on CKD3a  - Creatinine jumped up to 1.47 from 0.9. Patient doesn't have much muscle mass  - REE is likely due to ATN (hypotension) and IV contrast.   - Nephrology s/o 05/05  - 05/08: Cr going up. 1.42. Monitor closely while on diuretics. ARB held.  - 05/09: Nephrology: may need to tolerate higher creatinine to keep at a reasonable volume status.   - 05/10: Cr stable around 1.4       Anemia, acute on chronic  WALESKA  -Hgb of 7.7 noted. No e/o bleeding. CT abd/pelvis- no hematoma  -The drop from 11.0-->9.2-->7.7-->7.2-->7.1. Iron panel reviewed.  -Trend. Transfuse if Hgb <7 or symptomatic. Consent in the chart  - GI consult appreciated:  Iron studies consistent with iron deficiency but likely has component of chronic disease. Recent  EGD/Colonoscopy in 06/2022 for iron deficiency anemia ([place]) was negative for sources of bleeding but mentioned chronic peptic type duodenitis.   - Hemolysis labs, blood morphology, Vit B12, and folic acid ordered.   - 05/08: S/p EGD:  - Esophageal plaques were found, suspicious for  candidiasis. Biopsied.  - LA Grade A reflux esophagitis with no bleeding. - Congestive gastropathy. Biopsied. Recommend pantoprazole 40mg daily x 1 month for the reflux esophagitis.   - S/p 1 unit PRBC     Candidal esophagitis  - EGD as above  - Esophagus biopsy: Acute esophagitis with superficially associated fungal elements and prominent reactive epithelial changes  -Superficially associated fungal elements are identified on routine stains.  - 05/10: Check QT on EKG before starting Fluconazole. Monitor LFTs. Check in am  - QTc 405. Monitor on telemetry     Right RA bruise/hematoma  -Due to arterial puncture during the coronary angiogram  -Symptomatic treatment     COPD  - Not in exacerbation. C/w PTA inhaler Advair Diskus >> Breo Ellipta inpt.     Hypothyroidism: Resumed Levothyroxine  Mood disorder: Depression, anxiety.  Continue home Buspar, Topamax, Desyrel, and Seroquel  Peripheral neuropathy: Resumed home meds, is on multiple medications including gabapentin, Voltaren, Robaxin and Lidoderm patch  Peripheral vascular disease status post right AKA     PT/OT: Recommended TCU          Diet: Snacks/Supplements Adult: Magic Cup; With Meals  Combination Diet Moderate Consistent Carb (60 g CHO per Meal) Diet    DVT Prophylaxis: Pneumatic Compression Devices  Bear Catheter: Not present  Lines: PRESENT      PICC 05/02/25 Triple Lumen Right Brachial vein medial Vasopressor,access-Site Assessment: WDL      Cardiac Monitoring: ACTIVE order. Indication: QTc prolonging medication (48 hours)  Code Status: Full Code      Clinically Significant Risk Factors           # Hypocalcemia: Lowest Ca = 7.9 mg/dL in last 2 days, will monitor and replace  "as appropriate   # Hypomagnesemia: Lowest Mg = 1.5 mg/dL in last 2 days, will replace as needed       # Hypertension: Noted on problem list  # Acute heart failure with reduced ejection fraction: last echo with EF <40% and receiving IV diuretics          # DMII: A1C = 7.7 % (Ref range: <5.7 %) within past 6 months   # Overweight: Estimated body mass index is 26.93 kg/m  as calculated from the following:    Height as of this encounter: 1.651 m (5' 5\").    Weight as of this encounter: 73.4 kg (161 lb 13.1 oz).      # Asthma: noted on problem list        Social Drivers of Health    Depression: At risk (11/29/2024)    Received from [facility]    PHQ-2     PHQ-2 TOTAL SCORE: 4   Housing Stability: High Risk (5/5/2025)    Housing Stability     Do you have housing? : Yes     Are you worried about losing your housing?: Yes   Tobacco Use: High Risk (5/8/2025)    Patient History     Smoking Tobacco Use: Every Day     Smokeless Tobacco Use: Never   Physical Activity: Inactive (3/3/2023)    Received from [facility]    Exercise Vital Sign     Days of Exercise per Week: 0 days     Minutes of Exercise per Session: 0 min   Stress: Stress Concern Present (3/3/2023)    Received from [facility]    [employer] [place] [employer] - Occupational Stress Questionnaire     Feeling of Stress : To some extent   Social Connections: Socially Integrated (12/20/2024)    Received from [facility]    Social Connections     Do you often feel lonely or isolated from those around you?: 0   Recent Concern: Social Connections - Socially Isolated (11/29/2024)    Received from [facility]    Social Connections     Do you often feel lonely or isolated from those around you?: 4          Disposition Plan     Medically Ready for Discharge: Ready Now             Ike Payne MD  Hospitalist Service  [facility]  Securely message with [employer] (more info)  Text page via [facility] Paging/Directory   ______________________________________________________________________    Interval History   Patient new to me today.  Chart, labs and imaging results reviewed.  No new complaints.  Patient is medically ready for discharge once TCU is available.    Physical Exam   Vital Signs: Temp: 98  F (36.7  C) Temp src: Oral BP: 97/59 Pulse: 81   Resp: 28 SpO2: 94 % O2 Device: Nasal cannula    Weight: 161 lbs 13.08 oz    General Appearance: No distress noted  Respiratory: Diminished air entry basally bilaterally  Cardiovascular: S1 and S2 heard, no murmur or gallop  GI: Soft abdomen, no tenderness, normoactive bowel sounds  Skin: Intact and warm      Medical Decision Making       40 MINUTES SPENT BY ME on the date of service doing chart review, history, exam, documentation & further activities per the note.      Data

## 2025-05-11 NOTE — PLAN OF CARE
Problem: Glycemic Control Impaired  Goal: Blood Glucose Level Within Targeted Range  Outcome: Progressing  Goal: Minimize Risk of Hypoglycemia  Outcome: Progressing     Problem: Gas Exchange Impaired  Goal: Optimal Gas Exchange  Outcome: Progressing     Problem: Cardiac Catheterization (Diagnostic/Interventional)  Goal: Absence of Bleeding  Outcome: Progressing  Goal: Absence of Contrast-Induced Injury  Outcome: Progressing  Goal: Stable Heart Rate and Rhythm  Outcome: Progressing  Goal: Absence of Embolism Signs and Symptoms  Outcome: Progressing  Goal: Anesthesia/Sedation Recovery  Outcome: Progressing  Intervention: Optimize Anesthesia Recovery  Recent Flowsheet Documentation  Taken 5/10/2025 7705 by Marylou Guy RN  Safety Promotion/Fall Prevention:   activity supervised   clutter free environment maintained   increase visualization of patient   lighting adjusted   nonskid shoes/slippers when out of bed   patient and family education  Reorientation Measures:   calendar in view   clock in view  Goal: Optimal Pain Control and Function  Outcome: Progressing  Goal: Absence of Vascular Access Complication  Outcome: Progressing     VSS on RA. Complaining of 5/10 back pain before bed, requested PRN oxycodone, trazadone, and robaxin. Placed back on tele per provider orders, NSR. Using bedside commode, 1 assist to pivot.

## 2025-05-11 NOTE — PROGRESS NOTES
"Imp/plan:  S/p MI with PCI to LAD - on clopidogrel, no asp given anemia, (as low 6.8 5/8, last three days stable) and repatha  HFrEF with EF 20-25% on echo on adm, doing well, change frusemide  oral meds 40mg daily raise metoprolol 25mg bid and spironolactone 12.5mg daily, holding ARB (nephrology input appreciated).      Plan cont above meds, follow up with heart failrue team and Dr. Del Rio,     Will sign off      Review of Systems: 12 points negative other than above    /59 (BP Location: Left arm, Patient Position: Chair, Cuff Size: Adult Regular)   Pulse 82   Temp 97.9  F (36.6  C) (Oral)   Resp 18   Ht 1.651 m (5' 5\")   Wt 73.4 kg (161 lb 13.1 oz)   LMP 11/23/2012   SpO2 95%   BMI 26.93 kg/m    JVP<7cm, carotids normal  Lungs clear  Cor RRR no c,r,m  Abs soft +BS, no mass  Ext no c,c,e    Lab Results   Component Value Date    HGB 8.0 (L) 05/10/2025     Lab Results   Component Value Date     05/10/2025     No results found for: \"CREATININE\"  No components found for: \"K\"          Satya Marie MD  Interventional Cardiology   [facility]  [phone]    "

## 2025-05-12 ENCOUNTER — LAB REQUISITION (OUTPATIENT)
Dept: LAB | Facility: CLINIC | Age: 60
End: 2025-05-12
Payer: COMMERCIAL

## 2025-05-12 VITALS
WEIGHT: 156.31 LBS | HEART RATE: 77 BPM | SYSTOLIC BLOOD PRESSURE: 105 MMHG | DIASTOLIC BLOOD PRESSURE: 74 MMHG | BODY MASS INDEX: 26.04 KG/M2 | RESPIRATION RATE: 16 BRPM | OXYGEN SATURATION: 96 % | TEMPERATURE: 98.5 F | HEIGHT: 65 IN

## 2025-05-12 DIAGNOSIS — R76.12 NONSPECIFIC REACTION TO CELL MEDIATED IMMUNITY MEASUREMENT OF GAMMA INTERFERON ANTIGEN RESPONSE WITHOUT ACTIVE TUBERCULOSIS: ICD-10-CM

## 2025-05-12 LAB
ALBUMIN SERPL BCG-MCNC: 3.6 G/DL (ref 3.5–5.2)
ALP SERPL-CCNC: 169 U/L (ref 40–150)
ALT SERPL W P-5'-P-CCNC: 266 U/L (ref 0–50)
ANION GAP SERPL CALCULATED.3IONS-SCNC: 8 MMOL/L (ref 7–15)
AST SERPL W P-5'-P-CCNC: 84 U/L (ref 0–45)
ATRIAL RATE - MUSE: 76 BPM
BILIRUB DIRECT SERPL-MCNC: 0.21 MG/DL (ref 0–0.3)
BILIRUB SERPL-MCNC: 0.4 MG/DL
BUN SERPL-MCNC: 40.7 MG/DL (ref 8–23)
CALCIUM SERPL-MCNC: 8.1 MG/DL (ref 8.8–10.4)
CHLORIDE SERPL-SCNC: 97 MMOL/L (ref 98–107)
CREAT SERPL-MCNC: 1.29 MG/DL (ref 0.51–0.95)
DIASTOLIC BLOOD PRESSURE - MUSE: NORMAL MMHG
EGFRCR SERPLBLD CKD-EPI 2021: 47 ML/MIN/1.73M2
GLUCOSE BLDC GLUCOMTR-MCNC: 128 MG/DL (ref 70–99)
GLUCOSE BLDC GLUCOMTR-MCNC: 173 MG/DL (ref 70–99)
GLUCOSE SERPL-MCNC: 172 MG/DL (ref 70–99)
HCO3 SERPL-SCNC: 27 MMOL/L (ref 22–29)
INTERPRETATION ECG - MUSE: NORMAL
MAGNESIUM SERPL-MCNC: 2.2 MG/DL (ref 1.7–2.3)
P AXIS - MUSE: 46 DEGREES
POTASSIUM SERPL-SCNC: 4.6 MMOL/L (ref 3.4–5.3)
PR INTERVAL - MUSE: 144 MS
PROT SERPL-MCNC: 6.4 G/DL (ref 6.4–8.3)
QRS DURATION - MUSE: 108 MS
QT - MUSE: 398 MS
QTC - MUSE: 447 MS
R AXIS - MUSE: 72 DEGREES
SODIUM SERPL-SCNC: 132 MMOL/L (ref 135–145)
SYSTOLIC BLOOD PRESSURE - MUSE: NORMAL MMHG
T AXIS - MUSE: 155 DEGREES
VENTRICULAR RATE- MUSE: 76 BPM

## 2025-05-12 PROCEDURE — 99239 HOSP IP/OBS DSCHRG MGMT >30: CPT | Performed by: STUDENT IN AN ORGANIZED HEALTH CARE EDUCATION/TRAINING PROGRAM

## 2025-05-12 PROCEDURE — 93010 ELECTROCARDIOGRAM REPORT: CPT | Performed by: STUDENT IN AN ORGANIZED HEALTH CARE EDUCATION/TRAINING PROGRAM

## 2025-05-12 PROCEDURE — 250N000013 HC RX MED GY IP 250 OP 250 PS 637: Performed by: INTERNAL MEDICINE

## 2025-05-12 PROCEDURE — 250N000013 HC RX MED GY IP 250 OP 250 PS 637: Performed by: STUDENT IN AN ORGANIZED HEALTH CARE EDUCATION/TRAINING PROGRAM

## 2025-05-12 PROCEDURE — 83735 ASSAY OF MAGNESIUM: CPT | Performed by: STUDENT IN AN ORGANIZED HEALTH CARE EDUCATION/TRAINING PROGRAM

## 2025-05-12 PROCEDURE — 82247 BILIRUBIN TOTAL: CPT | Performed by: STUDENT IN AN ORGANIZED HEALTH CARE EDUCATION/TRAINING PROGRAM

## 2025-05-12 PROCEDURE — 82310 ASSAY OF CALCIUM: CPT | Performed by: INTERNAL MEDICINE

## 2025-05-12 RX ORDER — FLUCONAZOLE 200 MG/1
200 TABLET ORAL DAILY
DISCHARGE
Start: 2025-05-12 | End: 2025-05-23

## 2025-05-12 RX ORDER — QUETIAPINE FUMARATE 25 MG/1
25 TABLET, FILM COATED ORAL AT BEDTIME
Qty: 7 TABLET | Refills: 0 | Status: SHIPPED | OUTPATIENT
Start: 2025-05-12 | End: 2025-05-19

## 2025-05-12 RX ORDER — METOPROLOL SUCCINATE 25 MG/1
25 TABLET, EXTENDED RELEASE ORAL 2 TIMES DAILY
DISCHARGE
Start: 2025-05-12

## 2025-05-12 RX ORDER — FUROSEMIDE 40 MG/1
40 TABLET ORAL DAILY
DISCHARGE
Start: 2025-05-13

## 2025-05-12 RX ORDER — SPIRONOLACTONE 25 MG/1
12.5 TABLET ORAL DAILY
DISCHARGE
Start: 2025-05-13

## 2025-05-12 RX ORDER — ROSUVASTATIN CALCIUM 40 MG/1
40 TABLET, COATED ORAL DAILY
DISCHARGE
Start: 2025-05-26 | End: 2025-05-12

## 2025-05-12 RX ORDER — CLOPIDOGREL BISULFATE 75 MG/1
75 TABLET ORAL DAILY
DISCHARGE
Start: 2025-05-12

## 2025-05-12 RX ADMIN — INSULIN ASPART 3 UNITS: 100 INJECTION, SOLUTION INTRAVENOUS; SUBCUTANEOUS at 10:35

## 2025-05-12 RX ADMIN — BUSPIRONE HYDROCHLORIDE 5 MG: 5 TABLET ORAL at 08:47

## 2025-05-12 RX ADMIN — VALACYCLOVIR HYDROCHLORIDE 500 MG: 500 TABLET, FILM COATED ORAL at 08:46

## 2025-05-12 RX ADMIN — FUROSEMIDE 40 MG: 40 TABLET ORAL at 08:45

## 2025-05-12 RX ADMIN — PANTOPRAZOLE SODIUM 40 MG: 40 TABLET, DELAYED RELEASE ORAL at 08:45

## 2025-05-12 RX ADMIN — FLUTICASONE FUROATE AND VILANTEROL TRIFENATATE 1 PUFF: 200; 25 POWDER RESPIRATORY (INHALATION) at 08:46

## 2025-05-12 RX ADMIN — VENLAFAXINE HYDROCHLORIDE 75 MG: 75 CAPSULE, EXTENDED RELEASE ORAL at 08:47

## 2025-05-12 RX ADMIN — GABAPENTIN 300 MG: 300 CAPSULE ORAL at 08:45

## 2025-05-12 RX ADMIN — SPIRONOLACTONE 12.5 MG: 25 TABLET, FILM COATED ORAL at 08:45

## 2025-05-12 RX ADMIN — LEVOTHYROXINE SODIUM 125 MCG: 0.12 TABLET ORAL at 08:45

## 2025-05-12 RX ADMIN — METOPROLOL SUCCINATE 25 MG: 25 TABLET, EXTENDED RELEASE ORAL at 08:45

## 2025-05-12 RX ADMIN — BUPROPION HYDROCHLORIDE 300 MG: 300 TABLET, EXTENDED RELEASE ORAL at 08:45

## 2025-05-12 RX ADMIN — SENNOSIDES AND DOCUSATE SODIUM 1 TABLET: 50; 8.6 TABLET ORAL at 08:45

## 2025-05-12 ASSESSMENT — ACTIVITIES OF DAILY LIVING (ADL)
ADLS_ACUITY_SCORE: 62
ADLS_ACUITY_SCORE: 64
ADLS_ACUITY_SCORE: 62
ADLS_ACUITY_SCORE: 62
ADLS_ACUITY_SCORE: 64
ADLS_ACUITY_SCORE: 64
ADLS_ACUITY_SCORE: 62
ADLS_ACUITY_SCORE: 62
ADLS_ACUITY_SCORE: 64
ADLS_ACUITY_SCORE: 62
ADLS_ACUITY_SCORE: 64
ADLS_ACUITY_SCORE: 62
ADLS_ACUITY_SCORE: 64
ADLS_ACUITY_SCORE: 62
ADLS_ACUITY_SCORE: 64

## 2025-05-12 NOTE — PLAN OF CARE
PRN robitussin and duoneb given for cough.   PRN oxycodone given for 8/10 back pain; still reports pain as 7-8/10, PRN robaxin given; later rates pain 6/10, declines any more pain meds for now.   C/o constipation, scheduled bowel med given.    Large bruise w/ +1 edema noted on right wrist and forearm, pt states from after angio, pt reports bruising looks a lot better than few days ago; MD updated.    AOx4; VSS on RA; NSR on tele; denies CP, SOB, dizziness.  Able to make needs known, call light in reach.    Sharona Amador RN            Goal Outcome Evaluation:                      Problem: Adult Inpatient Plan of Care  Goal: Absence of Hospital-Acquired Illness or Injury  Intervention: Identify and Manage Fall Risk  Recent Flowsheet Documentation  Taken 5/11/2025 1700 by Sharona Amador RN  Safety Promotion/Fall Prevention:   activity supervised   assistive device/personal items within reach   supervised activity   safety round/check completed   nonskid shoes/slippers when out of bed   clutter free environment maintained  Intervention: Prevent Infection  Recent Flowsheet Documentation  Taken 5/11/2025 1700 by Sharona Amador RN  Infection Prevention:   hand hygiene promoted   environmental surveillance performed   personal protective equipment utilized   rest/sleep promoted   single patient room provided   equipment surfaces disinfected  Goal: Optimal Comfort and Wellbeing  Intervention: Monitor Pain and Promote Comfort  Recent Flowsheet Documentation  Taken 5/11/2025 1700 by Sharona Amador RN  Pain Management Interventions:   medication (see MAR)   pillow support provided   rest  Intervention: Provide Person-Centered Care  Recent Flowsheet Documentation  Taken 5/11/2025 1700 by Sharona Amador RN  Trust Relationship/Rapport:   care explained   choices provided   thoughts/feelings acknowledged   emotional support provided   empathic listening provided   questions answered   questions encouraged   reassurance provided      Problem: Cardiac Catheterization (Diagnostic/Interventional)  Goal: Anesthesia/Sedation Recovery  Intervention: Optimize Anesthesia Recovery  Recent Flowsheet Documentation  Taken 5/11/2025 1700 by Sharona Amador RN  Safety Promotion/Fall Prevention:   activity supervised   assistive device/personal items within reach   supervised activity   safety round/check completed   nonskid shoes/slippers when out of bed   clutter free environment maintained  Reorientation Measures:   calendar in view   clock in view  Goal: Optimal Pain Control and Function  Intervention: Prevent or Manage Pain  Recent Flowsheet Documentation  Taken 5/11/2025 1700 by Sharona Amador RN  Pain Management Interventions:   medication (see MAR)   pillow support provided   rest  Goal: Absence of Vascular Access Complication  Intervention: Prevent and Manage Access Complications  Recent Flowsheet Documentation  Taken 5/11/2025 1700 by Sharona Amador RN  Activity Management: activity adjusted per tolerance     Problem: Comorbidity Management  Goal: Maintenance of Asthma Control  Intervention: Maintain Asthma Symptom Control  Recent Flowsheet Documentation  Taken 5/11/2025 1700 by Sharona Amador RN  Medication Review/Management: medications reviewed  Goal: Blood Glucose Levels Within Targeted Range  Intervention: Monitor and Manage Glycemia  Recent Flowsheet Documentation  Taken 5/11/2025 1700 by Sharona Amador RN  Medication Review/Management: medications reviewed  Goal: Blood Pressure in Desired Range  Intervention: Maintain Blood Pressure Management  Recent Flowsheet Documentation  Taken 5/11/2025 1700 by Sharona Amador RN  Medication Review/Management: medications reviewed  Goal: Maintenance of Behavioral Health Symptom Control  Intervention: Maintain Behavioral Health Symptom Control  Recent Flowsheet Documentation  Taken 5/11/2025 1700 by Sharona Amador RN  Medication Review/Management: medications reviewed  Goal: Maintenance of COPD Symptom  Control  Intervention: Maintain COPD Symptom Control  Recent Flowsheet Documentation  Taken 5/11/2025 1700 by Sharona Amador RN  Medication Review/Management: medications reviewed  Goal: Maintenance of Heart Failure Symptom Control  Intervention: Maintain Heart Failure Management  Recent Flowsheet Documentation  Taken 5/11/2025 1700 by Sharona Amador RN  Medication Review/Management: medications reviewed  Goal: Blood Pressure in Desired Range  Intervention: Maintain Blood Pressure Management  Recent Flowsheet Documentation  Taken 5/11/2025 1700 by Sharona Amador RN  Medication Review/Management: medications reviewed     Problem: Risk for Delirium  Goal: Improved Behavioral Control  Intervention: Minimize Safety Risk  Recent Flowsheet Documentation  Taken 5/11/2025 1700 by Sharona Amador RN  Enhanced Safety Measures:   pain management   assistive devices when indicated   review medications for side effects with activity  Trust Relationship/Rapport:   care explained   choices provided   thoughts/feelings acknowledged   emotional support provided   empathic listening provided   questions answered   questions encouraged   reassurance provided  Goal: Improved Attention and Thought Clarity  Intervention: Maximize Cognitive Function  Recent Flowsheet Documentation  Taken 5/11/2025 1700 by Sharona Amador RN  Sensory Stimulation Regulation: television on  Reorientation Measures:   calendar in view   clock in view     Problem: Fall Injury Risk  Goal: Absence of Fall and Fall-Related Injury  Intervention: Identify and Manage Contributors  Recent Flowsheet Documentation  Taken 5/11/2025 1700 by Sharona Amador RN  Medication Review/Management: medications reviewed  Intervention: Promote Injury-Free Environment  Recent Flowsheet Documentation  Taken 5/11/2025 1700 by Sharona Amador RN  Safety Promotion/Fall Prevention:   activity supervised   assistive device/personal items within reach   supervised activity   safety round/check  completed   nonskid shoes/slippers when out of bed   clutter free environment maintained     Problem: Cardiac Catheterization (Diagnostic/Interventional)  Goal: Anesthesia/Sedation Recovery  Intervention: Optimize Anesthesia Recovery  Recent Flowsheet Documentation  Taken 5/11/2025 1700 by Sharona Amador RN  Safety Promotion/Fall Prevention:   activity supervised   assistive device/personal items within reach   supervised activity   safety round/check completed   nonskid shoes/slippers when out of bed   clutter free environment maintained  Reorientation Measures:   calendar in view   clock in view  Goal: Optimal Pain Control and Function  Intervention: Prevent or Manage Pain  Recent Flowsheet Documentation  Taken 5/11/2025 1700 by Sharona Amador RN  Pain Management Interventions:   medication (see MAR)   pillow support provided   rest  Goal: Absence of Vascular Access Complication  Intervention: Prevent and Manage Access Complications  Recent Flowsheet Documentation  Taken 5/11/2025 1700 by Sharona Amador, RN  Activity Management: activity adjusted per tolerance

## 2025-05-12 NOTE — PROGRESS NOTES
Care Management Discharge Note    Discharge Date: 05/12/2025       Discharge Disposition: Transitional Care    Discharge Services: None    Discharge DME: None    Discharge Transportation: family or friend will provide    Private pay costs discussed: Not applicable    Does the patient's insurance plan have a 3 day qualifying hospital stay waiver?  No    PAS Confirmation Code: [id number]  Patient/family educated on Medicare website which has current facility and service quality ratings:  Yes    Education Provided on the Discharge Plan: Yes  Persons Notified of Discharge Plans: Yes, pt, pt's son Rom, bedside RN, facility, and provider.   Patient/Family in Agreement with the Plan: yes    Handoff Referral Completed: Yes, non-[facility] PCP: External handoff communication completed    Additional Information:    Final [facility] ([address] [place] [address], [id number]) today 5/12. Pt's son Rom will pt up around noon today.       Puja Bell RN

## 2025-05-12 NOTE — PLAN OF CARE
Reports 6/10 back pain, declines pain interventions. Resting between cares.     AOx4; VSS on RA; NSR on tele; denies CP, SOB, dizziness.  Able to make needs known, call light in reach.    Sharona Amador RN            Goal Outcome Evaluation:                      Problem: Adult Inpatient Plan of Care  Goal: Absence of Hospital-Acquired Illness or Injury  Intervention: Identify and Manage Fall Risk  Recent Flowsheet Documentation  Taken 5/11/2025 2330 by Sharona Amador RN  Safety Promotion/Fall Prevention:   activity supervised   assistive device/personal items within reach   supervised activity   safety round/check completed   nonskid shoes/slippers when out of bed   clutter free environment maintained  Taken 5/11/2025 2100 by Sharona Amador RN  Safety Promotion/Fall Prevention:   activity supervised   assistive device/personal items within reach   supervised activity   safety round/check completed   nonskid shoes/slippers when out of bed   clutter free environment maintained  Taken 5/11/2025 1700 by Sharona Amador RN  Safety Promotion/Fall Prevention:   activity supervised   assistive device/personal items within reach   supervised activity   safety round/check completed   nonskid shoes/slippers when out of bed   clutter free environment maintained  Intervention: Prevent Infection  Recent Flowsheet Documentation  Taken 5/11/2025 2330 by Sharona Amador RN  Infection Prevention:   hand hygiene promoted   environmental surveillance performed   personal protective equipment utilized   rest/sleep promoted   single patient room provided   equipment surfaces disinfected  Taken 5/11/2025 2100 by Sharona Amador RN  Infection Prevention:   hand hygiene promoted   environmental surveillance performed   personal protective equipment utilized   rest/sleep promoted   single patient room provided   equipment surfaces disinfected  Taken 5/11/2025 1700 by Sharona Amador RN  Infection Prevention:   hand hygiene promoted   environmental  surveillance performed   personal protective equipment utilized   rest/sleep promoted   single patient room provided   equipment surfaces disinfected  Goal: Optimal Comfort and Wellbeing  Intervention: Monitor Pain and Promote Comfort  Recent Flowsheet Documentation  Taken 5/11/2025 2324 by Sharona Amador RN  Pain Management Interventions:   medication offered but refused   declines  Taken 5/11/2025 2112 by Sharona Amador RN  Pain Management Interventions:   medication offered but refused   declines  Taken 5/11/2025 1930 by Sharona Amador RN  Pain Management Interventions: medication (see MAR)  Taken 5/11/2025 1700 by Sharona Amador RN  Pain Management Interventions:   medication (see MAR)   pillow support provided   rest  Intervention: Provide Person-Centered Care  Recent Flowsheet Documentation  Taken 5/11/2025 2330 by Sharona Amador RN  Trust Relationship/Rapport:   care explained   choices provided   thoughts/feelings acknowledged   emotional support provided   empathic listening provided   questions answered   questions encouraged   reassurance provided  Taken 5/11/2025 2100 by Sharona Amador RN  Trust Relationship/Rapport:   care explained   choices provided   thoughts/feelings acknowledged   emotional support provided   empathic listening provided   questions answered   questions encouraged   reassurance provided  Taken 5/11/2025 1700 by Sharona Amador RN  Trust Relationship/Rapport:   care explained   choices provided   thoughts/feelings acknowledged   emotional support provided   empathic listening provided   questions answered   questions encouraged   reassurance provided     Problem: Cardiac Catheterization (Diagnostic/Interventional)  Goal: Anesthesia/Sedation Recovery  Intervention: Optimize Anesthesia Recovery  Recent Flowsheet Documentation  Taken 5/11/2025 2330 by Sharona Amador RN  Safety Promotion/Fall Prevention:   activity supervised   assistive device/personal items within reach   supervised activity    safety round/check completed   nonskid shoes/slippers when out of bed   clutter free environment maintained  Reorientation Measures:   calendar in view   clock in view  Taken 5/11/2025 2100 by Sharona Amador RN  Safety Promotion/Fall Prevention:   activity supervised   assistive device/personal items within reach   supervised activity   safety round/check completed   nonskid shoes/slippers when out of bed   clutter free environment maintained  Reorientation Measures:   calendar in view   clock in view  Taken 5/11/2025 1700 by Sharona Amador RN  Safety Promotion/Fall Prevention:   activity supervised   assistive device/personal items within reach   supervised activity   safety round/check completed   nonskid shoes/slippers when out of bed   clutter free environment maintained  Reorientation Measures:   calendar in view   clock in view  Goal: Optimal Pain Control and Function  Intervention: Prevent or Manage Pain  Recent Flowsheet Documentation  Taken 5/11/2025 2324 by Sharona Amador RN  Pain Management Interventions:   medication offered but refused   declines  Taken 5/11/2025 2112 by Sharona Amador RN  Pain Management Interventions:   medication offered but refused   declines  Taken 5/11/2025 1930 by Sharona Amador RN  Pain Management Interventions: medication (see MAR)  Taken 5/11/2025 1700 by Sharona Amador RN  Pain Management Interventions:   medication (see MAR)   pillow support provided   rest  Goal: Absence of Vascular Access Complication  Intervention: Prevent and Manage Access Complications  Recent Flowsheet Documentation  Taken 5/11/2025 2330 by Sharona Amador RN  Activity Management: activity adjusted per tolerance  Taken 5/11/2025 2100 by Sharona Amador RN  Activity Management: activity adjusted per tolerance  Taken 5/11/2025 1700 by Sharona Amador RN  Activity Management: activity adjusted per tolerance     Problem: Comorbidity Management  Goal: Maintenance of Asthma Control  Intervention: Maintain Asthma Symptom  Control  Recent Flowsheet Documentation  Taken 5/11/2025 2330 by Sharona Amador RN  Medication Review/Management: medications reviewed  Taken 5/11/2025 2100 by Sharona Amador RN  Medication Review/Management: medications reviewed  Taken 5/11/2025 1700 by Sharona Amador RN  Medication Review/Management: medications reviewed  Goal: Blood Glucose Levels Within Targeted Range  Intervention: Monitor and Manage Glycemia  Recent Flowsheet Documentation  Taken 5/11/2025 2330 by Sharona Amador RN  Medication Review/Management: medications reviewed  Taken 5/11/2025 2100 by Sharona Amador RN  Medication Review/Management: medications reviewed  Taken 5/11/2025 1700 by Sharona Amador RN  Medication Review/Management: medications reviewed  Goal: Blood Pressure in Desired Range  Intervention: Maintain Blood Pressure Management  Recent Flowsheet Documentation  Taken 5/11/2025 2330 by Sharona Amador RN  Medication Review/Management: medications reviewed  Taken 5/11/2025 2100 by Sharona Amador RN  Medication Review/Management: medications reviewed  Taken 5/11/2025 1700 by Sharona Amador RN  Medication Review/Management: medications reviewed  Goal: Maintenance of Behavioral Health Symptom Control  Intervention: Maintain Behavioral Health Symptom Control  Recent Flowsheet Documentation  Taken 5/11/2025 2330 by Sharona Amador RN  Medication Review/Management: medications reviewed  Taken 5/11/2025 2100 by Sharona Amador RN  Medication Review/Management: medications reviewed  Taken 5/11/2025 1700 by Sharona Amador RN  Medication Review/Management: medications reviewed  Goal: Maintenance of COPD Symptom Control  Intervention: Maintain COPD Symptom Control  Recent Flowsheet Documentation  Taken 5/11/2025 2330 by Sharona Amador RN  Medication Review/Management: medications reviewed  Taken 5/11/2025 2100 by Sharona Amador RN  Medication Review/Management: medications reviewed  Taken 5/11/2025 1700 by Sharona Amador RN  Medication Review/Management:  medications reviewed  Goal: Maintenance of Heart Failure Symptom Control  Intervention: Maintain Heart Failure Management  Recent Flowsheet Documentation  Taken 5/11/2025 2330 by Sharona Amador RN  Medication Review/Management: medications reviewed  Taken 5/11/2025 2100 by Sharona Amador RN  Medication Review/Management: medications reviewed  Taken 5/11/2025 1700 by Sharona Amador RN  Medication Review/Management: medications reviewed  Goal: Blood Pressure in Desired Range  Intervention: Maintain Blood Pressure Management  Recent Flowsheet Documentation  Taken 5/11/2025 2330 by Sharona Amador RN  Medication Review/Management: medications reviewed  Taken 5/11/2025 2100 by Sharona Amador RN  Medication Review/Management: medications reviewed  Taken 5/11/2025 1700 by Sharona Amador RN  Medication Review/Management: medications reviewed     Problem: Risk for Delirium  Goal: Improved Behavioral Control  Intervention: Minimize Safety Risk  Recent Flowsheet Documentation  Taken 5/11/2025 2330 by Sharona Amador RN  Enhanced Safety Measures:   pain management   assistive devices when indicated   review medications for side effects with activity  Trust Relationship/Rapport:   care explained   choices provided   thoughts/feelings acknowledged   emotional support provided   empathic listening provided   questions answered   questions encouraged   reassurance provided  Taken 5/11/2025 2100 by Sharona Amador RN  Enhanced Safety Measures:   pain management   assistive devices when indicated   review medications for side effects with activity  Trust Relationship/Rapport:   care explained   choices provided   thoughts/feelings acknowledged   emotional support provided   empathic listening provided   questions answered   questions encouraged   reassurance provided  Taken 5/11/2025 1700 by Sharona Amador RN  Enhanced Safety Measures:   pain management   assistive devices when indicated   review medications for side effects with activity  Trust  Relationship/Rapport:   care explained   choices provided   thoughts/feelings acknowledged   emotional support provided   empathic listening provided   questions answered   questions encouraged   reassurance provided  Goal: Improved Attention and Thought Clarity  Intervention: Maximize Cognitive Function  Recent Flowsheet Documentation  Taken 5/11/2025 2330 by Sharona Amador RN  Sensory Stimulation Regulation: television on  Reorientation Measures:   calendar in view   clock in view  Taken 5/11/2025 2100 by Sharona Amador RN  Sensory Stimulation Regulation: television on  Reorientation Measures:   calendar in view   clock in view  Taken 5/11/2025 1700 by Sharona Amador RN  Sensory Stimulation Regulation: television on  Reorientation Measures:   calendar in view   clock in view     Problem: Fall Injury Risk  Goal: Absence of Fall and Fall-Related Injury  Intervention: Identify and Manage Contributors  Recent Flowsheet Documentation  Taken 5/11/2025 2330 by Sharona Amador RN  Medication Review/Management: medications reviewed  Taken 5/11/2025 2100 by Sharona Amador RN  Medication Review/Management: medications reviewed  Taken 5/11/2025 1700 by Sharona Amador RN  Medication Review/Management: medications reviewed  Intervention: Promote Injury-Free Environment  Recent Flowsheet Documentation  Taken 5/11/2025 2330 by Sharona Amador RN  Safety Promotion/Fall Prevention:   activity supervised   assistive device/personal items within reach   supervised activity   safety round/check completed   nonskid shoes/slippers when out of bed   clutter free environment maintained  Taken 5/11/2025 2100 by Sharona Amador RN  Safety Promotion/Fall Prevention:   activity supervised   assistive device/personal items within reach   supervised activity   safety round/check completed   nonskid shoes/slippers when out of bed   clutter free environment maintained  Taken 5/11/2025 1700 by Sharona Amador RN  Safety Promotion/Fall Prevention:   activity  supervised   assistive device/personal items within reach   supervised activity   safety round/check completed   nonskid shoes/slippers when out of bed   clutter free environment maintained     Problem: Cardiac Catheterization (Diagnostic/Interventional)  Goal: Anesthesia/Sedation Recovery  Intervention: Optimize Anesthesia Recovery  Recent Flowsheet Documentation  Taken 5/11/2025 2330 by Sharona Amaodr RN  Safety Promotion/Fall Prevention:   activity supervised   assistive device/personal items within reach   supervised activity   safety round/check completed   nonskid shoes/slippers when out of bed   clutter free environment maintained  Reorientation Measures:   calendar in view   clock in view  Taken 5/11/2025 2100 by Sharona Amador RN  Safety Promotion/Fall Prevention:   activity supervised   assistive device/personal items within reach   supervised activity   safety round/check completed   nonskid shoes/slippers when out of bed   clutter free environment maintained  Reorientation Measures:   calendar in view   clock in view  Taken 5/11/2025 1700 by Sharona Amador RN  Safety Promotion/Fall Prevention:   activity supervised   assistive device/personal items within reach   supervised activity   safety round/check completed   nonskid shoes/slippers when out of bed   clutter free environment maintained  Reorientation Measures:   calendar in view   clock in view  Goal: Optimal Pain Control and Function  Intervention: Prevent or Manage Pain  Recent Flowsheet Documentation  Taken 5/11/2025 2324 by Sharona Amador RN  Pain Management Interventions:   medication offered but refused   declines  Taken 5/11/2025 2112 by Sharona Amador RN  Pain Management Interventions:   medication offered but refused   declines  Taken 5/11/2025 1930 by Sharona Amador RN  Pain Management Interventions: medication (see MAR)  Taken 5/11/2025 1700 by Sharona Amador RN  Pain Management Interventions:   medication (see MAR)   pillow support provided    rest  Goal: Absence of Vascular Access Complication  Intervention: Prevent and Manage Access Complications  Recent Flowsheet Documentation  Taken 5/11/2025 2330 by Sharona Amador, RN  Activity Management: activity adjusted per tolerance  Taken 5/11/2025 2100 by Sharona Amador, RN  Activity Management: activity adjusted per tolerance  Taken 5/11/2025 1700 by Sharona Amador, RN  Activity Management: activity adjusted per tolerance

## 2025-05-12 NOTE — DISCHARGE SUMMARY
"[facility]  Hospitalist Discharge Summary      Date of Admission:  5/1/2025  Date of Discharge:  5/12/2025  3:38 PM  Discharging Provider: Ike Payne MD  Discharge Service: Hospitalist Service    Discharge Diagnoses   STEMI  Coronary artery disease  Acute hypoxic respiratory failure, improving  Acute cardiogenic pulmonary edema, resolved  HFrEF exacerbation  Ischemic cardiomyopathy  Hypotension, resolved with  Type 2 diabetes mellitus  Hypertension  History of C. difficile  Elevated liver enzymes  REE on CKD 3  Acute on chronic iron deficiency anemia  Candida esophagitis  Right radial artery bruise/hematoma  COPD  Hypothyroidism  Mood disorder  Peripheral neuropathy  Peripheral vascular disease S/B right AKA    Clinically Significant Risk Factors     # DMII: A1C = 7.7 % (Ref range: <5.7 %) within past 6 months  # Overweight: Estimated body mass index is 26.01 kg/m  as calculated from the following:    Height as of this encounter: 1.651 m (5' 5\").    Weight as of this encounter: 70.9 kg (156 lb 4.9 oz).       Follow-ups Needed After Discharge   Follow-up Appointments       Follow Up and recommended labs and tests      Follow up with detention physician.  The following labs/tests are recommended: LFT in 3-5 days .              Unresulted Labs Ordered in the Past 30 Days of this Admission       No orders found from 4/1/2025 to 5/2/2025.        These results will be followed up by     Discharge Disposition   Discharged to short-term care facility  Condition at discharge: Stable    Hospital Course   59-year-old female with history of PAD status post right AKA, chronic kidney disease, anxiety, type 2 diabetes, hypertension, C. difficile and known coronary artery disease status post multiple PCI was admitted to ICU after STEMI and found to have severe occlusion of the LAD which was stented with 1 stent to the diagonal and 1 to the LAD.  [facility] consulted for postoperative " management.  STEMI  Coronary artery disease  - Status post PCI to prox LAD and Ist diagonal branch on 05/01  - Management per cardiac team.  Also has severe RCA disease which is nonoperative due to multiple comorbidities and will need to be managed medically.  - S/p Brilinta and ASA. Currently on Plavix   Acute hypoxic respiratory failure-improving  Acute cardiogenic pulmonary edema-resolved  HFrEF exacerbation  Ischemic cardiomyopathy  - Off of CPAP. On 1L NC now. Wean as able  - Echo- EF 25-30% with anterior, septal and apical wall akinesis, no LV thrombus  - 05/07: Cards ordered d-dimer earlier which came back elevated. Discussed with cards- The fact that CXR showing new small b/l pleural effusion and mild diffuse increase in interstitial prominence and pt's sob improving after switching from ticagrelor to plavix makes PE unlikely as the cause of sob. D-dimer is elevated likely 2/2 STEMI and ree. Pt also has severe anemia and worsening REE which puts patient as elevated risk of renal failure with CTA and bleeding with AC. We can start with US-duplex although VTE is less likely.  - Continue Lasix 40 mg oral daily  -Continue Toprol 25 mg oral twice daily  -Continue spironolactone 12.5 mg oral daily    Hypotension-resolved  -BP dropped to 60s/30s on 5/2. Mild improvement with 250ml fluid bolus and 25gm of albumin.   -Dobutamine gtt and NE gtt started per cardiology. NE gtt topped 5/4; Dobutamine gtt stopped as of 5/5  Type 2 diabetes:   - Hold home glipizide and metformin and start diabetic order set, most recent A1c 7.7 1-month ago.  - Patient requests regular diet  - Accu-checks, Medium sliding scale, mealtime novolog 1:10. Lantus reduced to 20 units due to low BS in am, Hypoglycemia protocol   Hypertension:  - Holding home meds-Norvasc, Carvedilol, Lisinopril  - Toprol XL 12.5 bid   History of C. difficile:   - Reports diarrhea PTA.   - Cdiff toxin B by PCR+, Cdiff GDH antigen+, Cdiff toxin-. ID consulted. No  need for treatment per ID. Avoid unnecessary antibiotics per ID.    Elevated liver enzymes:   - Likely secondary to the above acute issues, will trend.  - Elevate transaminases with normal alk phos and T bili noted. Patient is s/p lap laurence (many years ago per patient)  - Trend. CT abd/pelvis- moderate fatty infiltration of the liver  - On Repatha at home. Not on statin.  - Monitor while on fluconazole  REE on CKD3a  - Creatinine jumped up to 1.47 from 0.9. Patient doesn't have much muscle mass  - REE is likely due to ATN (hypotension) and IV contrast.   - Nephrology s/o 05/05  - 05/08: Cr going up. 1.42. Monitor closely while on diuretics. ARB held.  - 05/09: Nephrology: may need to tolerate higher creatinine to keep at a reasonable volume status.   - 05/10: Cr stable around 1.4     Anemia, acute on chronic  [place]  -Hgb of 7.7 noted. No e/o bleeding. CT abd/pelvis- no hematoma  -The drop from 11.0-->9.2-->7.7-->7.2-->7.1. Iron panel reviewed.  -Trend. Transfuse if Hgb <7 or symptomatic. Consent in the chart  - GI consult appreciated:  Iron studies consistent with iron deficiency but likely has component of chronic disease. Recent EGD/Colonoscopy in 06/2022 for iron deficiency anemia ([place]) was negative for sources of bleeding but mentioned chronic peptic type duodenitis.   - Hemolysis labs, blood morphology, Vit B12, and folic acid ordered.   - 05/08: S/p EGD:  - Esophageal plaques were found, suspicious for  candidiasis. Biopsied.  - LA Grade A reflux esophagitis with no bleeding. - Congestive gastropathy. Biopsied. Recommend pantoprazole 40mg daily x 1 month for the reflux esophagitis.   - S/p 1 unit PRBC   Candidal esophagitis  - EGD as above  - Esophagus biopsy: Acute esophagitis with superficially associated fungal elements and prominent reactive epithelial changes  -Superficially associated fungal elements are identified on routine stains.  - 05/10: Check QT on EKG before starting Fluconazole. Monitor LFTs.  Check in am  - QTc 405. Monitor on telemetry   Right RA bruise/hematoma  -Due to arterial puncture during the coronary angiogram  -Symptomatic treatment   COPD  - Not in exacerbation. C/w PTA inhaler Advair Diskus >> Breo Ellipta inpt.   Hypothyroidism: Resumed Levothyroxine  Mood disorder: Depression, anxiety.  Continue home Buspar, Topamax, Desyrel, and Seroquel  Peripheral neuropathy: Resumed home meds, is on multiple medications including gabapentin, Voltaren, Robaxin and Lidoderm patch  Peripheral vascular disease status post right AKA   PT/OT: Recommended TCU    Patient is clinically stable enough to be discharged to TCU today.  Patient will need liver function tests to be done in 3 to 5 days.  Medication reconciliation has been done.  Prescription for quetiapine has been sent    Consultations This Hospital Stay   HOSPITALIST IP CONSULT  HOSPITALIST IP CONSULT  NUTRITION SERVICES ADULT IP CONSULT  CARDIAC REHAB IP CONSULT  CARDIOLOGY IP CONSULT  INTENSIVIST IP CONSULT  CARE MANAGEMENT / SOCIAL WORK IP CONSULT  PHYSICAL THERAPY ADULT IP CONSULT  OCCUPATIONAL THERAPY ADULT IP CONSULT  CARE MANAGEMENT / SOCIAL WORK IP CONSULT  VASCULAR ACCESS ADULT IP CONSULT  NEPHROLOGY IP CONSULT  INFECTIOUS DISEASES IP CONSULT  GASTROENTEROLOGY IP CONSULT  NEPHROLOGY IP CONSULT  SMOKING CESSATION PROGRAM IP CONSULT    Code Status   Full Code    Time Spent on this Encounter   I, Ike Payne MD, personally saw the patient today and spent greater than 30 minutes discharging this patient.       Ike Payne MD  [facility]  [address] [place] [id number]  Phone: [phone]  Fax: [phone]  ______________________________________________________________________    Physical Exam   Vital Signs: Temp: 98.5  F (36.9  C) Temp src: Oral BP: 105/74 Pulse: 77   Resp: 16 SpO2: 96 % O2 Device: None (Room air)    Weight: 156 lbs 4.9 oz    General Appearance:  No distress  noted  Respiratory: Diminished air entry basally bilaterally  Cardiovascular: S1 and S2 heard, no murmur or gallop  GI: Soft abdomen, no tenderness, normoactive bowel sounds  Skin: Intact and warm          Primary Care Physician   Jose Lawrence    Discharge Orders      Cardiac Rehab  Referral      Ambulatory Cardiologist Referral      Follow-Up with Cardiology [facility] Referral      General info for SNF    Length of Stay Estimate: Short Term Care: Estimated # of Days <30  Condition at Discharge: Improving  Level of care:skilled   Rehabilitation Potential: Good  Admission H&P remains valid and up-to-date: Yes  Recent Chemotherapy: N/A  Use Nursing Home Standing Orders: Yes     Follow Up and recommended labs and tests    Follow up with custodial physician.  The following labs/tests are recommended: LFT in 3-5 days .     Reason for your hospital stay    ACS     Activity - Up with nursing assistance     Fall precautions     Diet    Follow this diet upon discharge: Current Diet:Orders Placed This Encounter      Snacks/Supplements Adult: Magic Cup; With Meals      Combination Diet Moderate Consistent Carb (60 g CHO per Meal) Diet       Significant Results and Procedures       Discharge Medications   Current Discharge Medication List        START taking these medications    Details   clopidogrel (PLAVIX) 75 MG tablet Take 1 tablet (75 mg) by mouth daily.    Associated Diagnoses: ST elevation myocardial infarction involving left anterior descending (LAD) coronary artery (H)      fluconazole (DIFLUCAN) 200 MG tablet Take 1 tablet (200 mg) by mouth daily for 11 days.    Associated Diagnoses: Esophageal candidiasis (H)      furosemide (LASIX) 40 MG tablet Take 1 tablet (40 mg) by mouth daily.    Associated Diagnoses: Hypertension, benign essential, goal below 140/90      metoprolol succinate ER (TOPROL XL) 25 MG 24 hr tablet Take 1 tablet (25 mg) by mouth 2 times daily.    Associated  Diagnoses: ST elevation myocardial infarction involving left anterior descending (LAD) coronary artery (H)      rosuvastatin (CRESTOR) 40 MG tablet Take 1 tablet (40 mg) by mouth daily.    Associated Diagnoses: ST elevation myocardial infarction involving left anterior descending (LAD) coronary artery (H)      spironolactone (ALDACTONE) 25 MG tablet Take 0.5 tablets (12.5 mg) by mouth daily.    Associated Diagnoses: Hypertension, benign essential, goal below 140/90           CONTINUE these medications which have NOT CHANGED    Details   acetaminophen (TYLENOL) 500 MG tablet Take 1,000 mg by mouth every 6 hours as needed for mild pain.      albuterol (PROAIR HFA/PROVENTIL HFA/VENTOLIN HFA) 108 (90 Base) MCG/ACT inhaler Inhale 2 puffs into the lungs every 4 hours as needed for shortness of breath, wheezing or cough.      alpha-lipoic acid 600 MG capsule Take 600 mg by mouth daily.      buprenorphine (BUTRANS) 5 MCG/HR WK patch Place 1 patch onto the skin every 7 days.      buPROPion (WELLBUTRIN XL) 300 MG 24 hr tablet Take 300 mg by mouth every morning.      busPIRone (BUSPAR) 5 MG tablet Take 5 mg by mouth 3 times daily.      diclofenac (VOLTAREN) 1 % topical gel Apply 2 g topically 4 times daily as needed for moderate pain.      evolocumab (REPATHA) 140 MG/ML prefilled autoinjector Inject 140 mg subcutaneously every 14 days.      fluticasone-salmeterol (ADVAIR DISKUS) 500-50 MCG/DOSE diskus inhaler Inhale 1 puff into the lungs every 12 hours.  Qty: 1 Inhaler, Refills: 11    Associated Diagnoses: Moderate persistent asthma      gabapentin (NEURONTIN) 100 MG capsule Take 300 mg by mouth 3 times daily.      insulin glargine (LANTUS PEN) 100 UNIT/ML pen Inject 23 Units subcutaneously at bedtime.      ipratropium - albuterol 0.5 mg/2.5 mg/3 mL (DUONEB) 0.5-2.5 (3) MG/3ML neb solution Take 1 vial by nebulization every 6 hours as needed for shortness of breath, wheezing or cough.      levothyroxine (SYNTHROID/LEVOTHROID)  125 MCG tablet Take 125 mcg by mouth every morning (before breakfast).      Lidocaine (LIDOCARE) 4 % Patch Place 1 patch onto the skin every 24 hours. To prevent lidocaine toxicity, patient should be patch free for 12 hrs daily.      Melatonin 10 MG TABS tablet Take 10 mg by mouth at bedtime.      methocarbamol (ROBAXIN) 500 MG tablet Take 500 mg by mouth every 6 hours as needed for muscle spasms.      multivitamin w/minerals (THERA-VIT-M) tablet Take 1 tablet by mouth daily.      nystatin (MYCOSTATIN) 763032 UNIT/GM external powder Apply topically 2 times daily as needed for other.      ondansetron (ZOFRAN ODT) 4 MG ODT tab Take 4 mg by mouth every 8 hours as needed for nausea.      pantoprazole (PROTONIX) 20 MG EC tablet Take 20 mg by mouth daily.      QUEtiapine (SEROQUEL) 25 MG tablet Take 25 mg by mouth at bedtime.      topiramate (TOPAMAX) 25 MG tablet Take 25 mg by mouth 2 times daily as needed.      traZODone (DESYREL) 50 MG tablet Take 50 mg by mouth nightly as needed for sleep.      valACYclovir (VALTREX) 500 MG tablet Take 1 tablet by mouth daily.  Qty: 30 tablet, Refills: 1    Associated Diagnoses: Herpes simplex type 1 infection      venlafaxine (EFFEXOR XR) 75 MG 24 hr capsule Take 75 mg by mouth daily.      ORDER FOR DME Tennis elbow strap  Qty: 1 Device, Refills: 0    Associated Diagnoses: Elbow pain           STOP taking these medications       amLODIPine (NORVASC) 2.5 MG tablet Comments:   Reason for Stopping:         aspirin 81 MG tablet Comments:   Reason for Stopping:         carvedilol (COREG) 25 MG tablet Comments:   Reason for Stopping:         lisinopril (ZESTRIL) 30 MG tablet Comments:   Reason for Stopping:         metFORMIN (GLUCOPHAGE XR) 500 MG 24 hr tablet Comments:   Reason for Stopping:             Allergies   Allergies   Allergen Reactions    Azithromycin Hives    Levaquin Unknown    Pcn [Penicillins] Rash     Rash as a child.    Pt tolerated IV ceftriaxone in ER on 10/7/23.

## 2025-05-12 NOTE — PROGRESS NOTES
Infection Prevention Progress Note  5/12/2025      Patient Name: Gloria Penn [id number]  Admit Date: 5/1/2025    MDRO Discontinuation  Infection Prevention has reviewed this patient's chart per the MDRO D/C Policy and have taken the following action:    Patient meets all the criteria for discontinuation and Infection Prevention will resolve the MRSA infection status.    If you have any questions, please contact Infection Prevention.    Lina Fragoso, Infection Prevention

## 2025-05-12 NOTE — PROGRESS NOTES
Physical Therapy Discharge Summary    Reason for therapy discharge:    Discharged to transitional care facility.    Progress towards therapy goal(s). See goals on Care Plan in [facility] electronic health record for goal details.  Goals not met.  Barriers to achieving goals:   discharge from facility.    Therapy recommendation(s):    Continued therapy is recommended.  Rationale/Recommendations:  TCU to progress independence with functional mobility.

## 2025-05-12 NOTE — PLAN OF CARE
Occupational Therapy and Cardiac Rehab Discharge Summary    Reason for therapy discharge:    Discharged to transitional care facility.    Progress towards therapy goal(s). See goals on Care Plan in [facility] electronic health record for goal details.  Goals not met.  Barriers to achieving goals:   discharge from facility.    Therapy recommendation(s):    Continued therapy is recommended.  Rationale/Recommendations:  maximize ADL IND.

## 2025-05-12 NOTE — PROGRESS NOTES
VSS, denies pain, pivots to bedside commode with no concern. Utilizes call light appropriately and is able to verbalize needs  effectively.

## 2025-05-12 NOTE — PLAN OF CARE
Problem: Glycemic Control Impaired  Goal: Blood Glucose Level Within Targeted Range  Outcome: Met  Goal: Minimize Risk of Hypoglycemia  Outcome: Met     Problem: Gas Exchange Impaired  Goal: Optimal Gas Exchange  Outcome: Met     Problem: Cardiac Catheterization (Diagnostic/Interventional)  Goal: Absence of Bleeding  Outcome: Met  Goal: Absence of Contrast-Induced Injury  Outcome: Met  Goal: Stable Heart Rate and Rhythm  Outcome: Met  Goal: Absence of Embolism Signs and Symptoms  Outcome: Met  Intervention: Prevent or Manage Embolism  Recent Flowsheet Documentation  Taken 5/12/2025 0830 by Anais Leon RN  VTE Prevention/Management: SCDs off (sequential compression devices)  Goal: Anesthesia/Sedation Recovery  Outcome: Met  Intervention: Optimize Anesthesia Recovery  Recent Flowsheet Documentation  Taken 5/12/2025 0830 by Anais Leon RN  Safety Promotion/Fall Prevention: activity supervised  Reorientation Measures: clock in view  Goal: Optimal Pain Control and Function  Outcome: Met  Goal: Absence of Vascular Access Complication  Outcome: Met  Intervention: Prevent and Manage Access Complications  Recent Flowsheet Documentation  Taken 5/12/2025 0830 by Anais Leon RN  Bleeding Precautions: blood pressure closely monitored  Activity Management: activity adjusted per tolerance   Goal Outcome Evaluation:       Patient discharging to TCU. Family to transport around noon.      Gave report to TCU RN FERDINAND at 1255.

## 2025-05-13 ENCOUNTER — LAB REQUISITION (OUTPATIENT)
Dept: LAB | Facility: CLINIC | Age: 60
End: 2025-05-13
Payer: COMMERCIAL

## 2025-05-13 ENCOUNTER — PATIENT OUTREACH (OUTPATIENT)
Dept: CARE COORDINATION | Facility: CLINIC | Age: 60
End: 2025-05-13
Payer: COMMERCIAL

## 2025-05-13 DIAGNOSIS — E11.9 TYPE 2 DIABETES MELLITUS WITHOUT COMPLICATIONS (H): ICD-10-CM

## 2025-05-13 NOTE — PROGRESS NOTES
[facility]    Background: Transitional Care Management program identified per system criteria and reviewed by [facility] team for possible outreach.    Assessment: Upon chart review, [facility] Team member will not proceed with patient outreach related to this episode of Transitional Care Management program due to reason below:    Patient has discharged to a Memory Care, Long-term Care, Assisted Living or Group Home where patient is receiving on-site support with their daily cares, including support with hospital follow up plan.    Plan: Transitional Care Management episode addressed appropriately per reason noted above.          FERN Patel  [phone]  [facility]

## 2025-05-14 LAB
QUANTIFERON MITOGEN: 0.55 IU/ML
QUANTIFERON NIL TUBE: 0.04 IU/ML
QUANTIFERON TB1 TUBE: 0.04 IU/ML
QUANTIFERON TB2 TUBE: 0.04

## 2025-05-15 LAB
GAMMA INTERFERON BACKGROUND BLD IA-ACNC: 0.04 IU/ML
M TB IFN-G BLD-IMP: NEGATIVE
M TB IFN-G CD4+ BCKGRND COR BLD-ACNC: 0.51 IU/ML
MITOGEN IGNF BCKGRD COR BLD-ACNC: 0 IU/ML
MITOGEN IGNF BCKGRD COR BLD-ACNC: 0 IU/ML

## 2025-05-16 LAB
ALBUMIN SERPL BCG-MCNC: 3.7 G/DL (ref 3.5–5.2)
ALP SERPL-CCNC: 203 U/L (ref 40–150)
ALT SERPL W P-5'-P-CCNC: 265 U/L (ref 0–50)
AST SERPL W P-5'-P-CCNC: 110 U/L (ref 0–45)
BILIRUB DIRECT SERPL-MCNC: 0.22 MG/DL (ref 0–0.45)
BILIRUB SERPL-MCNC: 0.5 MG/DL
PROT SERPL-MCNC: 6.9 G/DL (ref 6.4–8.3)

## 2025-05-16 PROCEDURE — P9604 ONE-WAY ALLOW PRORATED TRIP: HCPCS | Mod: ORL | Performed by: FAMILY MEDICINE

## 2025-05-16 PROCEDURE — 80076 HEPATIC FUNCTION PANEL: CPT | Mod: ORL | Performed by: FAMILY MEDICINE

## 2025-05-16 PROCEDURE — 36415 COLL VENOUS BLD VENIPUNCTURE: CPT | Mod: ORL | Performed by: FAMILY MEDICINE

## 2025-05-23 ENCOUNTER — LAB REQUISITION (OUTPATIENT)
Dept: LAB | Facility: CLINIC | Age: 60
End: 2025-05-23
Payer: COMMERCIAL

## 2025-05-23 DIAGNOSIS — J44.1 CHRONIC OBSTRUCTIVE PULMONARY DISEASE WITH (ACUTE) EXACERBATION (H): ICD-10-CM

## 2025-05-27 ENCOUNTER — LAB REQUISITION (OUTPATIENT)
Dept: LAB | Facility: CLINIC | Age: 60
End: 2025-05-27
Payer: COMMERCIAL

## 2025-05-27 DIAGNOSIS — R29.6 REPEATED FALLS: ICD-10-CM

## 2025-05-27 LAB
ANION GAP SERPL CALCULATED.3IONS-SCNC: 12 MMOL/L (ref 7–15)
BUN SERPL-MCNC: 21.4 MG/DL (ref 8–23)
CALCIUM SERPL-MCNC: 8.3 MG/DL (ref 8.8–10.4)
CHLORIDE SERPL-SCNC: 96 MMOL/L (ref 98–107)
CREAT SERPL-MCNC: 0.93 MG/DL (ref 0.51–0.95)
EGFRCR SERPLBLD CKD-EPI 2021: 70 ML/MIN/1.73M2
GLUCOSE SERPL-MCNC: 199 MG/DL (ref 70–99)
HCO3 SERPL-SCNC: 23 MMOL/L (ref 22–29)
POTASSIUM SERPL-SCNC: 4.6 MMOL/L (ref 3.4–5.3)
SODIUM SERPL-SCNC: 131 MMOL/L (ref 135–145)

## 2025-05-28 ENCOUNTER — LAB REQUISITION (OUTPATIENT)
Dept: LAB | Facility: CLINIC | Age: 60
End: 2025-05-28
Payer: COMMERCIAL

## 2025-05-28 DIAGNOSIS — R29.6 REPEATED FALLS: ICD-10-CM

## 2025-05-28 LAB
ALBUMIN SERPL BCG-MCNC: 3.5 G/DL (ref 3.5–5.2)
ALP SERPL-CCNC: 197 U/L (ref 40–150)
ALT SERPL W P-5'-P-CCNC: 540 U/L (ref 0–50)
ANION GAP SERPL CALCULATED.3IONS-SCNC: 18 MMOL/L (ref 7–15)
AST SERPL W P-5'-P-CCNC: 129 U/L (ref 0–45)
BASOPHILS # BLD AUTO: 0 10E3/UL (ref 0–0.2)
BASOPHILS NFR BLD AUTO: 0 %
BILIRUB SERPL-MCNC: 0.8 MG/DL
BUN SERPL-MCNC: 20.2 MG/DL (ref 8–23)
CALCIUM SERPL-MCNC: 8.7 MG/DL (ref 8.8–10.4)
CHLORIDE SERPL-SCNC: 93 MMOL/L (ref 98–107)
CREAT SERPL-MCNC: 0.87 MG/DL (ref 0.51–0.95)
EGFRCR SERPLBLD CKD-EPI 2021: 76 ML/MIN/1.73M2
EOSINOPHIL # BLD AUTO: 0 10E3/UL (ref 0–0.7)
EOSINOPHIL NFR BLD AUTO: 0 %
ERYTHROCYTE [DISTWIDTH] IN BLOOD BY AUTOMATED COUNT: 20 % (ref 10–15)
GLUCOSE SERPL-MCNC: 275 MG/DL (ref 70–99)
HCO3 SERPL-SCNC: 22 MMOL/L (ref 22–29)
HCT VFR BLD AUTO: 33.7 % (ref 35–47)
HGB BLD-MCNC: 9.5 G/DL (ref 11.7–15.7)
IMM GRANULOCYTES # BLD: 0.1 10E3/UL
IMM GRANULOCYTES NFR BLD: 1 %
LYMPHOCYTES # BLD AUTO: 0.6 10E3/UL (ref 0.8–5.3)
LYMPHOCYTES NFR BLD AUTO: 5 %
MCH RBC QN AUTO: 22.5 PG (ref 26.5–33)
MCHC RBC AUTO-ENTMCNC: 28.2 G/DL (ref 31.5–36.5)
MCV RBC AUTO: 80 FL (ref 78–100)
MONOCYTES # BLD AUTO: 0.7 10E3/UL (ref 0–1.3)
MONOCYTES NFR BLD AUTO: 7 %
NEUTROPHILS # BLD AUTO: 9.1 10E3/UL (ref 1.6–8.3)
NEUTROPHILS NFR BLD AUTO: 87 %
NRBC # BLD AUTO: 0 10E3/UL
NRBC BLD AUTO-RTO: 0 /100
PLATELET # BLD AUTO: 277 10E3/UL (ref 150–450)
POTASSIUM SERPL-SCNC: 4.2 MMOL/L (ref 3.4–5.3)
PROT SERPL-MCNC: 6.7 G/DL (ref 6.4–8.3)
RBC # BLD AUTO: 4.23 10E6/UL (ref 3.8–5.2)
SODIUM SERPL-SCNC: 133 MMOL/L (ref 135–145)
T4 FREE SERPL-MCNC: 1.42 NG/DL (ref 0.9–1.7)
TSH SERPL DL<=0.005 MIU/L-ACNC: 7.32 UIU/ML (ref 0.3–4.2)
WBC # BLD AUTO: 10.5 10E3/UL (ref 4–11)

## 2025-05-28 PROCEDURE — 84443 ASSAY THYROID STIM HORMONE: CPT | Mod: ORL | Performed by: NURSE PRACTITIONER

## 2025-05-28 PROCEDURE — 80053 COMPREHEN METABOLIC PANEL: CPT | Mod: ORL | Performed by: NURSE PRACTITIONER

## 2025-05-28 PROCEDURE — P9603 ONE-WAY ALLOW PRORATED MILES: HCPCS | Mod: ORL | Performed by: NURSE PRACTITIONER

## 2025-05-28 PROCEDURE — 84439 ASSAY OF FREE THYROXINE: CPT | Mod: ORL | Performed by: NURSE PRACTITIONER

## 2025-05-28 PROCEDURE — 36415 COLL VENOUS BLD VENIPUNCTURE: CPT | Mod: ORL | Performed by: NURSE PRACTITIONER

## 2025-05-28 PROCEDURE — 85025 COMPLETE CBC W/AUTO DIFF WBC: CPT | Mod: ORL | Performed by: NURSE PRACTITIONER

## 2025-05-29 ENCOUNTER — LAB REQUISITION (OUTPATIENT)
Dept: LAB | Facility: CLINIC | Age: 60
End: 2025-05-29
Payer: COMMERCIAL

## 2025-05-29 DIAGNOSIS — R94.5 ABNORMAL RESULTS OF LIVER FUNCTION STUDIES: ICD-10-CM

## 2025-05-29 DIAGNOSIS — R29.6 REPEATED FALLS: ICD-10-CM

## 2025-05-29 LAB
ALBUMIN UR-MCNC: NEGATIVE MG/DL
APPEARANCE UR: CLEAR
BILIRUB UR QL STRIP: NEGATIVE
COLOR UR AUTO: YELLOW
GLUCOSE UR STRIP-MCNC: NEGATIVE MG/DL
HGB UR QL STRIP: NEGATIVE
KETONES UR STRIP-MCNC: NEGATIVE MG/DL
LEUKOCYTE ESTERASE UR QL STRIP: ABNORMAL
NITRATE UR QL: NEGATIVE
PH UR STRIP: 6 [PH] (ref 5–7)
RBC URINE: 0 /HPF
SP GR UR STRIP: 1.02 (ref 1–1.03)
SQUAMOUS EPITHELIAL: 2 /HPF
TRANSITIONAL EPI: <1 /HPF
UROBILINOGEN UR STRIP-MCNC: NORMAL MG/DL
WBC URINE: 2 /HPF

## 2025-05-29 PROCEDURE — 81001 URINALYSIS AUTO W/SCOPE: CPT | Mod: ORL | Performed by: FAMILY MEDICINE

## 2025-05-29 PROCEDURE — 87086 URINE CULTURE/COLONY COUNT: CPT | Mod: ORL | Performed by: FAMILY MEDICINE

## 2025-05-30 LAB
ALBUMIN SERPL BCG-MCNC: 3.4 G/DL (ref 3.5–5.2)
ALP SERPL-CCNC: 143 U/L (ref 40–150)
ALT SERPL W P-5'-P-CCNC: 84 U/L (ref 0–50)
AST SERPL W P-5'-P-CCNC: 51 U/L (ref 0–45)
BILIRUB DIRECT SERPL-MCNC: 0.2 MG/DL (ref 0–0.45)
BILIRUB SERPL-MCNC: 0.5 MG/DL
PROT SERPL-MCNC: 7.3 G/DL (ref 6.4–8.3)

## 2025-05-30 PROCEDURE — P9604 ONE-WAY ALLOW PRORATED TRIP: HCPCS | Mod: ORL | Performed by: NURSE PRACTITIONER

## 2025-05-30 PROCEDURE — 80076 HEPATIC FUNCTION PANEL: CPT | Mod: ORL | Performed by: NURSE PRACTITIONER

## 2025-05-30 PROCEDURE — 36415 COLL VENOUS BLD VENIPUNCTURE: CPT | Mod: ORL | Performed by: NURSE PRACTITIONER

## 2025-05-31 LAB
BACTERIA UR CULT: ABNORMAL

## 2025-06-05 ENCOUNTER — LAB REQUISITION (OUTPATIENT)
Dept: LAB | Facility: CLINIC | Age: 60
End: 2025-06-05
Payer: COMMERCIAL

## 2025-06-05 DIAGNOSIS — E11.42 TYPE 2 DIABETES MELLITUS WITH DIABETIC POLYNEUROPATHY (H): ICD-10-CM

## 2025-06-06 LAB
ANION GAP SERPL CALCULATED.3IONS-SCNC: 15 MMOL/L (ref 7–15)
BUN SERPL-MCNC: 19.3 MG/DL (ref 8–23)
CALCIUM SERPL-MCNC: 8.6 MG/DL (ref 8.8–10.4)
CHLORIDE SERPL-SCNC: 96 MMOL/L (ref 98–107)
CREAT SERPL-MCNC: 1.18 MG/DL (ref 0.51–0.95)
EGFRCR SERPLBLD CKD-EPI 2021: 53 ML/MIN/1.73M2
GLUCOSE SERPL-MCNC: 162 MG/DL (ref 70–99)
HCO3 SERPL-SCNC: 26 MMOL/L (ref 22–29)
POTASSIUM SERPL-SCNC: 3.4 MMOL/L (ref 3.4–5.3)
SODIUM SERPL-SCNC: 137 MMOL/L (ref 135–145)

## 2025-06-06 PROCEDURE — 36415 COLL VENOUS BLD VENIPUNCTURE: CPT | Mod: ORL | Performed by: NURSE PRACTITIONER

## 2025-06-06 PROCEDURE — 80048 BASIC METABOLIC PNL TOTAL CA: CPT | Mod: ORL | Performed by: NURSE PRACTITIONER

## 2025-06-06 PROCEDURE — P9604 ONE-WAY ALLOW PRORATED TRIP: HCPCS | Mod: ORL | Performed by: NURSE PRACTITIONER

## 2025-06-10 ENCOUNTER — LAB REQUISITION (OUTPATIENT)
Dept: LAB | Facility: CLINIC | Age: 60
End: 2025-06-10
Payer: COMMERCIAL

## 2025-06-10 DIAGNOSIS — I50.9 HEART FAILURE, UNSPECIFIED (H): ICD-10-CM

## 2025-06-11 LAB
ANION GAP SERPL CALCULATED.3IONS-SCNC: 17 MMOL/L (ref 7–15)
BUN SERPL-MCNC: 29.3 MG/DL (ref 8–23)
CALCIUM SERPL-MCNC: 9.6 MG/DL (ref 8.8–10.4)
CHLORIDE SERPL-SCNC: 100 MMOL/L (ref 98–107)
CREAT SERPL-MCNC: 1.13 MG/DL (ref 0.51–0.95)
EGFRCR SERPLBLD CKD-EPI 2021: 55 ML/MIN/1.73M2
GLUCOSE SERPL-MCNC: 114 MG/DL (ref 70–99)
HCO3 SERPL-SCNC: 24 MMOL/L (ref 22–29)
POTASSIUM SERPL-SCNC: 3.6 MMOL/L (ref 3.4–5.3)
SODIUM SERPL-SCNC: 141 MMOL/L (ref 135–145)

## 2025-06-11 PROCEDURE — 36415 COLL VENOUS BLD VENIPUNCTURE: CPT | Mod: ORL | Performed by: NURSE PRACTITIONER

## 2025-06-11 PROCEDURE — 80048 BASIC METABOLIC PNL TOTAL CA: CPT | Mod: ORL | Performed by: NURSE PRACTITIONER

## 2025-06-11 PROCEDURE — P9604 ONE-WAY ALLOW PRORATED TRIP: HCPCS | Mod: ORL | Performed by: NURSE PRACTITIONER

## 2025-06-19 ENCOUNTER — ANCILLARY PROCEDURE (OUTPATIENT)
Dept: ULTRASOUND IMAGING | Facility: CLINIC | Age: 60
End: 2025-06-19
Attending: NURSE PRACTITIONER
Payer: COMMERCIAL

## 2025-06-19 DIAGNOSIS — R94.5 ABNORMAL RESULTS OF LIVER FUNCTION STUDIES: ICD-10-CM

## 2025-06-19 PROCEDURE — 76705 ECHO EXAM OF ABDOMEN: CPT | Mod: TC | Performed by: RADIOLOGY

## 2025-06-20 ENCOUNTER — LAB REQUISITION (OUTPATIENT)
Dept: LAB | Facility: CLINIC | Age: 60
End: 2025-06-20
Payer: COMMERCIAL

## 2025-06-20 DIAGNOSIS — E87.6 HYPOKALEMIA: ICD-10-CM

## 2025-06-24 LAB — POTASSIUM SERPL-SCNC: 4.6 MMOL/L (ref 3.4–5.3)

## 2025-06-25 ENCOUNTER — LAB REQUISITION (OUTPATIENT)
Dept: LAB | Facility: CLINIC | Age: 60
End: 2025-06-25
Payer: COMMERCIAL

## 2025-06-25 DIAGNOSIS — I50.22 CHRONIC SYSTOLIC (CONGESTIVE) HEART FAILURE (H): ICD-10-CM

## 2025-06-27 LAB
ANION GAP SERPL CALCULATED.3IONS-SCNC: 13 MMOL/L (ref 7–15)
BUN SERPL-MCNC: 25.7 MG/DL (ref 8–23)
CALCIUM SERPL-MCNC: 9.3 MG/DL (ref 8.8–10.4)
CHLORIDE SERPL-SCNC: 98 MMOL/L (ref 98–107)
CREAT SERPL-MCNC: 1.16 MG/DL (ref 0.51–0.95)
EGFRCR SERPLBLD CKD-EPI 2021: 54 ML/MIN/1.73M2
GLUCOSE SERPL-MCNC: 291 MG/DL (ref 70–99)
HCO3 SERPL-SCNC: 26 MMOL/L (ref 22–29)
POTASSIUM SERPL-SCNC: 3.9 MMOL/L (ref 3.4–5.3)
SODIUM SERPL-SCNC: 137 MMOL/L (ref 135–145)

## 2025-06-27 PROCEDURE — 36415 COLL VENOUS BLD VENIPUNCTURE: CPT | Mod: ORL | Performed by: NURSE PRACTITIONER

## 2025-06-27 PROCEDURE — P9604 ONE-WAY ALLOW PRORATED TRIP: HCPCS | Mod: ORL | Performed by: NURSE PRACTITIONER

## 2025-06-27 PROCEDURE — 80048 BASIC METABOLIC PNL TOTAL CA: CPT | Mod: ORL | Performed by: NURSE PRACTITIONER

## 2025-07-10 ENCOUNTER — LAB REQUISITION (OUTPATIENT)
Dept: LAB | Facility: CLINIC | Age: 60
End: 2025-07-10
Payer: COMMERCIAL

## 2025-07-10 DIAGNOSIS — E87.6 HYPOKALEMIA: ICD-10-CM

## 2025-07-10 DIAGNOSIS — R33.9 RETENTION OF URINE, UNSPECIFIED: ICD-10-CM

## 2025-07-10 LAB
ALBUMIN UR-MCNC: NEGATIVE MG/DL
APPEARANCE UR: CLEAR
BACTERIA #/AREA URNS HPF: ABNORMAL /HPF
BILIRUB UR QL STRIP: NEGATIVE
COLOR UR AUTO: ABNORMAL
GLUCOSE UR STRIP-MCNC: NEGATIVE MG/DL
HGB UR QL STRIP: NEGATIVE
HYALINE CASTS: 1 /LPF
KETONES UR STRIP-MCNC: NEGATIVE MG/DL
LEUKOCYTE ESTERASE UR QL STRIP: ABNORMAL
MUCOUS THREADS #/AREA URNS LPF: PRESENT /LPF
NITRATE UR QL: NEGATIVE
PH UR STRIP: 5.5 [PH] (ref 5–7)
RBC URINE: 1 /HPF
SP GR UR STRIP: 1.01 (ref 1–1.03)
SQUAMOUS EPITHELIAL: 3 /HPF
TRANSITIONAL EPI: <1 /HPF
UROBILINOGEN UR STRIP-MCNC: NORMAL MG/DL
WBC URINE: 18 /HPF

## 2025-07-10 PROCEDURE — 81001 URINALYSIS AUTO W/SCOPE: CPT | Mod: ORL | Performed by: FAMILY MEDICINE

## 2025-07-10 PROCEDURE — 87086 URINE CULTURE/COLONY COUNT: CPT | Mod: ORL | Performed by: FAMILY MEDICINE

## 2025-07-11 ENCOUNTER — LAB REQUISITION (OUTPATIENT)
Dept: LAB | Facility: CLINIC | Age: 60
End: 2025-07-11
Payer: COMMERCIAL

## 2025-07-11 DIAGNOSIS — N39.0 URINARY TRACT INFECTION, SITE NOT SPECIFIED: ICD-10-CM

## 2025-07-11 LAB
ALBUMIN UR-MCNC: NEGATIVE MG/DL
APPEARANCE UR: CLEAR
BACTERIA UR CULT: NORMAL
BILIRUB UR QL STRIP: NEGATIVE
COLOR UR AUTO: ABNORMAL
GLUCOSE UR STRIP-MCNC: NEGATIVE MG/DL
HGB UR QL STRIP: NEGATIVE
KETONES UR STRIP-MCNC: NEGATIVE MG/DL
LEUKOCYTE ESTERASE UR QL STRIP: ABNORMAL
NITRATE UR QL: NEGATIVE
PH UR STRIP: 6.5 [PH] (ref 5–7)
RBC URINE: <1 /HPF
SP GR UR STRIP: 1.01 (ref 1–1.03)
TRANSITIONAL EPI: <1 /HPF
UROBILINOGEN UR STRIP-MCNC: NORMAL MG/DL
WBC URINE: 96 /HPF

## 2025-07-11 PROCEDURE — 87186 SC STD MICRODIL/AGAR DIL: CPT | Mod: ORL | Performed by: NURSE PRACTITIONER

## 2025-07-11 PROCEDURE — 81001 URINALYSIS AUTO W/SCOPE: CPT | Mod: ORL | Performed by: NURSE PRACTITIONER

## 2025-07-12 ENCOUNTER — HEALTH MAINTENANCE LETTER (OUTPATIENT)
Age: 60
End: 2025-07-12

## 2025-07-15 LAB
ANION GAP SERPL CALCULATED.3IONS-SCNC: 14 MMOL/L (ref 7–15)
BUN SERPL-MCNC: 38.2 MG/DL (ref 8–23)
CALCIUM SERPL-MCNC: 9.1 MG/DL (ref 8.8–10.4)
CHLORIDE SERPL-SCNC: 98 MMOL/L (ref 98–107)
CREAT SERPL-MCNC: 1.16 MG/DL (ref 0.51–0.95)
EGFRCR SERPLBLD CKD-EPI 2021: 54 ML/MIN/1.73M2
EST. AVERAGE GLUCOSE BLD GHB EST-MCNC: 232 MG/DL
GLUCOSE SERPL-MCNC: 216 MG/DL (ref 70–99)
HBA1C MFR BLD: 9.7 %
HCO3 SERPL-SCNC: 26 MMOL/L (ref 22–29)
MCV RBC AUTO: 80 FL (ref 78–100)
PLATELET # BLD AUTO: 304 10E3/UL (ref 150–450)
POTASSIUM SERPL-SCNC: 3.7 MMOL/L (ref 3.4–5.3)
SODIUM SERPL-SCNC: 138 MMOL/L (ref 135–145)

## 2025-07-16 LAB — BACTERIA UR CULT: ABNORMAL

## 2025-08-13 ENCOUNTER — OFFICE VISIT (OUTPATIENT)
Dept: CARDIOLOGY | Facility: CLINIC | Age: 60
End: 2025-08-13
Payer: COMMERCIAL

## 2025-08-13 ENCOUNTER — APPOINTMENT (OUTPATIENT)
Dept: CARDIOLOGY | Facility: CLINIC | Age: 60
End: 2025-08-13
Payer: COMMERCIAL

## 2025-08-13 VITALS
BODY MASS INDEX: 27.49 KG/M2 | HEART RATE: 83 BPM | HEIGHT: 64 IN | RESPIRATION RATE: 14 BRPM | SYSTOLIC BLOOD PRESSURE: 135 MMHG | WEIGHT: 161 LBS | DIASTOLIC BLOOD PRESSURE: 81 MMHG

## 2025-08-13 DIAGNOSIS — I25.5 ISCHEMIC CARDIOMYOPATHY: ICD-10-CM

## 2025-08-13 DIAGNOSIS — J43.9 PULMONARY EMPHYSEMA, UNSPECIFIED EMPHYSEMA TYPE (H): ICD-10-CM

## 2025-08-13 DIAGNOSIS — E03.9 HYPOTHYROIDISM (ACQUIRED): ICD-10-CM

## 2025-08-13 DIAGNOSIS — I51.9 RIGHT VENTRICULAR DYSFUNCTION: ICD-10-CM

## 2025-08-13 DIAGNOSIS — Z87.891 PERSONAL HISTORY OF TOBACCO USE, PRESENTING HAZARDS TO HEALTH: ICD-10-CM

## 2025-08-13 DIAGNOSIS — I25.10 CORONARY ARTERY DISEASE INVOLVING NATIVE CORONARY ARTERY OF NATIVE HEART WITHOUT ANGINA PECTORIS: ICD-10-CM

## 2025-08-13 DIAGNOSIS — I50.23 ACUTE ON CHRONIC SYSTOLIC HEART FAILURE (H): Primary | ICD-10-CM

## 2025-08-13 DIAGNOSIS — E11.9 TYPE 2 DIABETES, HBA1C GOAL < 7% (H): ICD-10-CM

## 2025-08-13 DIAGNOSIS — N18.2 CKD (CHRONIC KIDNEY DISEASE) STAGE 2, GFR 60-89 ML/MIN: ICD-10-CM

## 2025-08-13 DIAGNOSIS — I10 HYPERTENSION, BENIGN ESSENTIAL, GOAL BELOW 140/90: ICD-10-CM

## 2025-08-13 DIAGNOSIS — I10 ESSENTIAL HYPERTENSION: ICD-10-CM

## 2025-08-13 DIAGNOSIS — E78.5 DYSLIPIDEMIA: ICD-10-CM

## 2025-08-13 LAB
ANION GAP SERPL CALCULATED.3IONS-SCNC: 13 MMOL/L (ref 7–15)
BUN SERPL-MCNC: 29.8 MG/DL (ref 8–23)
CALCIUM SERPL-MCNC: 8.8 MG/DL (ref 8.8–10.4)
CHLORIDE SERPL-SCNC: 98 MMOL/L (ref 98–107)
CREAT SERPL-MCNC: 1.13 MG/DL (ref 0.51–0.95)
EGFRCR SERPLBLD CKD-EPI 2021: 55 ML/MIN/1.73M2
GLUCOSE SERPL-MCNC: 229 MG/DL (ref 70–99)
HCO3 SERPL-SCNC: 27 MMOL/L (ref 22–29)
MAGNESIUM SERPL-MCNC: 1.5 MG/DL (ref 1.7–2.3)
NT-PROBNP SERPL-MCNC: 2375 PG/ML (ref 0–226)
POTASSIUM SERPL-SCNC: 4 MMOL/L (ref 3.4–5.3)
SODIUM SERPL-SCNC: 138 MMOL/L (ref 135–145)

## 2025-08-13 PROCEDURE — 99214 OFFICE O/P EST MOD 30 MIN: CPT | Performed by: NURSE PRACTITIONER

## 2025-08-13 PROCEDURE — G2211 COMPLEX E/M VISIT ADD ON: HCPCS | Performed by: NURSE PRACTITIONER

## 2025-08-13 PROCEDURE — 3079F DIAST BP 80-89 MM HG: CPT | Performed by: NURSE PRACTITIONER

## 2025-08-13 PROCEDURE — 36415 COLL VENOUS BLD VENIPUNCTURE: CPT | Performed by: NURSE PRACTITIONER

## 2025-08-13 PROCEDURE — 83880 ASSAY OF NATRIURETIC PEPTIDE: CPT | Performed by: NURSE PRACTITIONER

## 2025-08-13 PROCEDURE — 83735 ASSAY OF MAGNESIUM: CPT | Performed by: NURSE PRACTITIONER

## 2025-08-13 PROCEDURE — 3075F SYST BP GE 130 - 139MM HG: CPT | Performed by: NURSE PRACTITIONER

## 2025-08-13 PROCEDURE — 80048 BASIC METABOLIC PNL TOTAL CA: CPT | Performed by: NURSE PRACTITIONER

## 2025-08-13 RX ORDER — ESTRADIOL 0.1 MG/G
CREAM VAGINAL
COMMUNITY
Start: 2024-10-29

## 2025-08-13 RX ORDER — RISPERIDONE 2 MG/1
2 TABLET ORAL DAILY
COMMUNITY
Start: 2025-08-03

## 2025-08-13 RX ORDER — SPIRONOLACTONE 25 MG/1
25 TABLET ORAL DAILY
COMMUNITY

## 2025-08-13 RX ORDER — GABAPENTIN 300 MG/1
300 CAPSULE ORAL 3 TIMES DAILY
COMMUNITY
Start: 2025-07-01

## 2025-08-31 ENCOUNTER — LAB REQUISITION (OUTPATIENT)
Dept: LAB | Facility: CLINIC | Age: 60
End: 2025-08-31
Payer: COMMERCIAL

## 2025-08-31 DIAGNOSIS — R30.0 DYSURIA: ICD-10-CM

## 2025-08-31 LAB
ALBUMIN UR-MCNC: NEGATIVE MG/DL
APPEARANCE UR: ABNORMAL
BILIRUB UR QL STRIP: NEGATIVE
COLOR UR AUTO: YELLOW
GLUCOSE UR STRIP-MCNC: NEGATIVE MG/DL
HGB UR QL STRIP: NEGATIVE
KETONES UR STRIP-MCNC: NEGATIVE MG/DL
LEUKOCYTE ESTERASE UR QL STRIP: ABNORMAL
NITRATE UR QL: NEGATIVE
PH UR STRIP: 6 [PH] (ref 5–7)
RBC URINE: 1 /HPF
SP GR UR STRIP: 1.01 (ref 1–1.03)
TRANSITIONAL EPI: 1 /HPF
UROBILINOGEN UR STRIP-MCNC: NORMAL MG/DL
WBC CLUMPS #/AREA URNS HPF: PRESENT /HPF
WBC URINE: >182 /HPF

## 2025-09-01 LAB — BACTERIA UR CULT: ABNORMAL

## (undated) DEVICE — KIT HAND CONTROL ACIST 014644 AR-P54

## (undated) DEVICE — SYR ANGIOGRAPHY MULTIUSE KIT ACIST 014612

## (undated) DEVICE — ELECTRODE DEFIB CADENCE 22550R

## (undated) DEVICE — CUSTOM PACK CORONARY SAN5BCRHEA

## (undated) DEVICE — SOL WATER IRRIG 1000ML BOTTLE 2F7114

## (undated) DEVICE — CATH ANGIO JUDKINS R4 6FRX100CM INFINITI 534621T

## (undated) DEVICE — VALVE HEMOSTATIC WATCHDOG 8FR INNER LUMEN H74939343021

## (undated) DEVICE — CATH BALLOON NC EMERGE 2.50X12MM H7493926712250

## (undated) DEVICE — SUCTION MANIFOLD NEPTUNE 2 SYS 1 PORT 702-025-000

## (undated) DEVICE — EXCHANGE WIRE .035 260 STAR/JFC/035/260/ M001491681

## (undated) DEVICE — MANIFOLD KIT ANGIO AUTOMATED 014613

## (undated) DEVICE — CATH BALLOON EMERGE 2.0X12MM H7493918912200

## (undated) DEVICE — SHTH INTRO 0.021IN ID 6FR DIA

## (undated) DEVICE — CATH IVUS OPTICROSS HD 6 3.6FR 1.18MM DIA 135CML H7493935408

## (undated) DEVICE — GUIDEWIRE VASC 0.014"X190CML 3CML TIP AHW14R104S

## (undated) DEVICE — FORCEP BIOPSY 2.3MM DISP COATED 000388

## (undated) DEVICE — TUBING SUCTION MEDI-VAC 1/4"X20' N620A

## (undated) DEVICE — DEVICE INFLATION SYR W/ HEMOSTASIS VALVE 12IN EXT IN4904

## (undated) DEVICE — CATH LAUNCHER 6FR EBU 3.5 LA6EBU35

## (undated) DEVICE — GUIDEWIRE VASC 0.014INX180CM RUNTHROUGH 25-1011

## (undated) DEVICE — CATH BALLOON NC EMERGE 3.00X12MM H7493926712300

## (undated) DEVICE — SLEEVE TR BAND RADIAL COMPRESSION DEVICE 24CM TRB24-REG

## (undated) RX ORDER — FUROSEMIDE 10 MG/ML
INJECTION INTRAMUSCULAR; INTRAVENOUS
Status: DISPENSED
Start: 2025-05-01

## (undated) RX ORDER — PROPOFOL 10 MG/ML
INJECTION, EMULSION INTRAVENOUS
Status: DISPENSED
Start: 2025-05-08

## (undated) RX ORDER — FENTANYL CITRATE 50 UG/ML
INJECTION, SOLUTION INTRAMUSCULAR; INTRAVENOUS
Status: DISPENSED
Start: 2025-05-01

## (undated) RX ORDER — FENTANYL CITRATE 50 UG/ML
INJECTION, SOLUTION INTRAMUSCULAR; INTRAVENOUS
Status: DISPENSED
Start: 2025-05-08

## (undated) RX ORDER — DEXAMETHASONE SODIUM PHOSPHATE 10 MG/ML
INJECTION, SOLUTION INTRAMUSCULAR; INTRAVENOUS
Status: DISPENSED
Start: 2025-05-08